# Patient Record
Sex: MALE | Race: WHITE | NOT HISPANIC OR LATINO | Employment: OTHER | URBAN - METROPOLITAN AREA
[De-identification: names, ages, dates, MRNs, and addresses within clinical notes are randomized per-mention and may not be internally consistent; named-entity substitution may affect disease eponyms.]

---

## 2021-05-31 ENCOUNTER — RECORDS - HEALTHEAST (OUTPATIENT)
Dept: ADMINISTRATIVE | Facility: CLINIC | Age: 76
End: 2021-05-31

## 2021-06-09 ENCOUNTER — COMMUNICATION - HEALTHEAST (OUTPATIENT)
Dept: SCHEDULING | Facility: CLINIC | Age: 76
End: 2021-06-09

## 2021-06-16 PROBLEM — M62.82 RHABDOMYOLYSIS: Status: ACTIVE | Noted: 2021-06-08

## 2024-02-01 ENCOUNTER — APPOINTMENT (OUTPATIENT)
Dept: RADIOLOGY | Facility: CLINIC | Age: 79
End: 2024-02-01
Attending: EMERGENCY MEDICINE
Payer: COMMERCIAL

## 2024-02-01 ENCOUNTER — HOSPITAL ENCOUNTER (OUTPATIENT)
Facility: CLINIC | Age: 79
Setting detail: OBSERVATION
Discharge: NURSING, REHAB OR LONG-TERM CARE | End: 2024-02-04
Attending: EMERGENCY MEDICINE | Admitting: STUDENT IN AN ORGANIZED HEALTH CARE EDUCATION/TRAINING PROGRAM
Payer: COMMERCIAL

## 2024-02-01 DIAGNOSIS — R53.1 GENERALIZED WEAKNESS: ICD-10-CM

## 2024-02-01 DIAGNOSIS — N17.9 AKI (ACUTE KIDNEY INJURY) (H): ICD-10-CM

## 2024-02-01 DIAGNOSIS — I48.4 ATYPICAL ATRIAL FLUTTER (H): ICD-10-CM

## 2024-02-01 DIAGNOSIS — K59.09 OTHER CONSTIPATION: Primary | ICD-10-CM

## 2024-02-01 DIAGNOSIS — E86.0 DEHYDRATION: ICD-10-CM

## 2024-02-01 DIAGNOSIS — R79.89 ELEVATED TROPONIN: ICD-10-CM

## 2024-02-01 DIAGNOSIS — E78.5 HYPERLIPIDEMIA, UNSPECIFIED HYPERLIPIDEMIA TYPE: ICD-10-CM

## 2024-02-01 LAB
ALBUMIN SERPL BCG-MCNC: 4.2 G/DL (ref 3.5–5.2)
ALP SERPL-CCNC: 70 U/L (ref 40–150)
ALT SERPL W P-5'-P-CCNC: 10 U/L (ref 0–70)
ANION GAP SERPL CALCULATED.3IONS-SCNC: 11 MMOL/L (ref 7–15)
AST SERPL W P-5'-P-CCNC: 20 U/L (ref 0–45)
BASOPHILS # BLD AUTO: 0 10E3/UL (ref 0–0.2)
BASOPHILS NFR BLD AUTO: 0 %
BILIRUB SERPL-MCNC: 0.4 MG/DL
BUN SERPL-MCNC: 31.1 MG/DL (ref 8–23)
CALCIUM SERPL-MCNC: 9.6 MG/DL (ref 8.8–10.2)
CHLORIDE SERPL-SCNC: 110 MMOL/L (ref 98–107)
CREAT SERPL-MCNC: 1.76 MG/DL (ref 0.67–1.17)
DEPRECATED HCO3 PLAS-SCNC: 22 MMOL/L (ref 22–29)
EGFRCR SERPLBLD CKD-EPI 2021: 39 ML/MIN/1.73M2
EOSINOPHIL # BLD AUTO: 0 10E3/UL (ref 0–0.7)
EOSINOPHIL NFR BLD AUTO: 0 %
ERYTHROCYTE [DISTWIDTH] IN BLOOD BY AUTOMATED COUNT: 13.5 % (ref 10–15)
GLUCOSE SERPL-MCNC: 107 MG/DL (ref 70–99)
HCT VFR BLD AUTO: 29.1 % (ref 40–53)
HGB BLD-MCNC: 9.6 G/DL (ref 13.3–17.7)
IMM GRANULOCYTES # BLD: 0.1 10E3/UL
IMM GRANULOCYTES NFR BLD: 1 %
LACTATE SERPL-SCNC: 1.5 MMOL/L (ref 0.7–2)
LYMPHOCYTES # BLD AUTO: 0.8 10E3/UL (ref 0.8–5.3)
LYMPHOCYTES NFR BLD AUTO: 8 %
MCH RBC QN AUTO: 32.4 PG (ref 26.5–33)
MCHC RBC AUTO-ENTMCNC: 33 G/DL (ref 31.5–36.5)
MCV RBC AUTO: 98 FL (ref 78–100)
MONOCYTES # BLD AUTO: 0.9 10E3/UL (ref 0–1.3)
MONOCYTES NFR BLD AUTO: 8 %
NEUTROPHILS # BLD AUTO: 9 10E3/UL (ref 1.6–8.3)
NEUTROPHILS NFR BLD AUTO: 83 %
NRBC # BLD AUTO: 0 10E3/UL
NRBC BLD AUTO-RTO: 0 /100
PLATELET # BLD AUTO: 263 10E3/UL (ref 150–450)
POTASSIUM SERPL-SCNC: 4.6 MMOL/L (ref 3.4–5.3)
PROCALCITONIN SERPL IA-MCNC: 0.29 NG/ML
PROT SERPL-MCNC: 6.8 G/DL (ref 6.4–8.3)
RBC # BLD AUTO: 2.96 10E6/UL (ref 4.4–5.9)
SODIUM SERPL-SCNC: 143 MMOL/L (ref 135–145)
TROPONIN T SERPL HS-MCNC: 85 NG/L
WBC # BLD AUTO: 10.8 10E3/UL (ref 4–11)

## 2024-02-01 PROCEDURE — 83036 HEMOGLOBIN GLYCOSYLATED A1C: CPT

## 2024-02-01 PROCEDURE — 71045 X-RAY EXAM CHEST 1 VIEW: CPT

## 2024-02-01 PROCEDURE — 87040 BLOOD CULTURE FOR BACTERIA: CPT | Performed by: EMERGENCY MEDICINE

## 2024-02-01 PROCEDURE — 36415 COLL VENOUS BLD VENIPUNCTURE: CPT | Performed by: EMERGENCY MEDICINE

## 2024-02-01 PROCEDURE — 84484 ASSAY OF TROPONIN QUANT: CPT | Performed by: EMERGENCY MEDICINE

## 2024-02-01 PROCEDURE — 80053 COMPREHEN METABOLIC PANEL: CPT | Performed by: EMERGENCY MEDICINE

## 2024-02-01 PROCEDURE — 99285 EMERGENCY DEPT VISIT HI MDM: CPT | Mod: 25

## 2024-02-01 PROCEDURE — 93005 ELECTROCARDIOGRAM TRACING: CPT | Performed by: EMERGENCY MEDICINE

## 2024-02-01 PROCEDURE — 96360 HYDRATION IV INFUSION INIT: CPT

## 2024-02-01 PROCEDURE — 84145 PROCALCITONIN (PCT): CPT | Performed by: EMERGENCY MEDICINE

## 2024-02-01 PROCEDURE — 258N000003 HC RX IP 258 OP 636: Performed by: EMERGENCY MEDICINE

## 2024-02-01 PROCEDURE — 85025 COMPLETE CBC W/AUTO DIFF WBC: CPT | Performed by: EMERGENCY MEDICINE

## 2024-02-01 PROCEDURE — 83605 ASSAY OF LACTIC ACID: CPT | Performed by: EMERGENCY MEDICINE

## 2024-02-01 RX ADMIN — SODIUM CHLORIDE 1000 ML: 9 INJECTION, SOLUTION INTRAVENOUS at 23:11

## 2024-02-01 ASSESSMENT — ACTIVITIES OF DAILY LIVING (ADL): ADLS_ACUITY_SCORE: 35

## 2024-02-01 ASSESSMENT — ENCOUNTER SYMPTOMS
COUGH: 0
SHORTNESS OF BREATH: 0
BACK PAIN: 1
WEAKNESS: 1
CHILLS: 0
ARTHRALGIAS: 1

## 2024-02-02 ENCOUNTER — APPOINTMENT (OUTPATIENT)
Dept: CARDIOLOGY | Facility: CLINIC | Age: 79
End: 2024-02-02
Attending: INTERNAL MEDICINE
Payer: COMMERCIAL

## 2024-02-02 ENCOUNTER — APPOINTMENT (OUTPATIENT)
Dept: OCCUPATIONAL THERAPY | Facility: CLINIC | Age: 79
End: 2024-02-02
Payer: COMMERCIAL

## 2024-02-02 ENCOUNTER — APPOINTMENT (OUTPATIENT)
Dept: PHYSICAL THERAPY | Facility: CLINIC | Age: 79
End: 2024-02-02
Payer: COMMERCIAL

## 2024-02-02 PROBLEM — I48.4 ATYPICAL ATRIAL FLUTTER (H): Status: ACTIVE | Noted: 2024-02-02

## 2024-02-02 PROBLEM — R79.89 ELEVATED TROPONIN: Status: ACTIVE | Noted: 2024-02-02

## 2024-02-02 PROBLEM — R53.1 GENERALIZED WEAKNESS: Status: ACTIVE | Noted: 2024-02-02

## 2024-02-02 PROBLEM — E86.0 DEHYDRATION: Status: ACTIVE | Noted: 2024-02-02

## 2024-02-02 LAB
ALBUMIN UR-MCNC: 10 MG/DL
ANION GAP SERPL CALCULATED.3IONS-SCNC: 8 MMOL/L (ref 7–15)
APPEARANCE UR: CLEAR
BACTERIA #/AREA URNS HPF: ABNORMAL /HPF
BILIRUB UR QL STRIP: NEGATIVE
BUN SERPL-MCNC: 28.9 MG/DL (ref 8–23)
CALCIUM SERPL-MCNC: 8.9 MG/DL (ref 8.8–10.2)
CHLORIDE SERPL-SCNC: 112 MMOL/L (ref 98–107)
COLOR UR AUTO: ABNORMAL
CREAT SERPL-MCNC: 1.57 MG/DL (ref 0.67–1.17)
DEPRECATED HCO3 PLAS-SCNC: 23 MMOL/L (ref 22–29)
EGFRCR SERPLBLD CKD-EPI 2021: 45 ML/MIN/1.73M2
ERYTHROCYTE [DISTWIDTH] IN BLOOD BY AUTOMATED COUNT: 13.7 % (ref 10–15)
FLUAV RNA SPEC QL NAA+PROBE: NEGATIVE
FLUBV RNA RESP QL NAA+PROBE: NEGATIVE
GLUCOSE BLDC GLUCOMTR-MCNC: 104 MG/DL (ref 70–99)
GLUCOSE SERPL-MCNC: 96 MG/DL (ref 70–99)
GLUCOSE UR STRIP-MCNC: NEGATIVE MG/DL
HBA1C MFR BLD: 6.7 %
HCT VFR BLD AUTO: 27.2 % (ref 40–53)
HGB BLD-MCNC: 9 G/DL (ref 13.3–17.7)
HGB UR QL STRIP: NEGATIVE
HYALINE CASTS: 8 /LPF
KETONES UR STRIP-MCNC: NEGATIVE MG/DL
LEUKOCYTE ESTERASE UR QL STRIP: NEGATIVE
MCH RBC QN AUTO: 32.8 PG (ref 26.5–33)
MCHC RBC AUTO-ENTMCNC: 33.1 G/DL (ref 31.5–36.5)
MCV RBC AUTO: 99 FL (ref 78–100)
MUCOUS THREADS #/AREA URNS LPF: PRESENT /LPF
NITRATE UR QL: NEGATIVE
PH UR STRIP: 5 [PH] (ref 5–7)
PLATELET # BLD AUTO: 232 10E3/UL (ref 150–450)
POTASSIUM SERPL-SCNC: 4.1 MMOL/L (ref 3.4–5.3)
RBC # BLD AUTO: 2.74 10E6/UL (ref 4.4–5.9)
RBC URINE: 1 /HPF
RSV RNA SPEC NAA+PROBE: NEGATIVE
SARS-COV-2 RNA RESP QL NAA+PROBE: NEGATIVE
SODIUM SERPL-SCNC: 143 MMOL/L (ref 135–145)
SP GR UR STRIP: 1.01 (ref 1–1.03)
SQUAMOUS EPITHELIAL: <1 /HPF
TROPONIN T SERPL HS-MCNC: 70 NG/L
TSH SERPL DL<=0.005 MIU/L-ACNC: 2.62 UIU/ML (ref 0.3–4.2)
UROBILINOGEN UR STRIP-MCNC: <2 MG/DL
WBC # BLD AUTO: 8.4 10E3/UL (ref 4–11)
WBC URINE: 1 /HPF

## 2024-02-02 PROCEDURE — 81001 URINALYSIS AUTO W/SCOPE: CPT | Performed by: EMERGENCY MEDICINE

## 2024-02-02 PROCEDURE — 97166 OT EVAL MOD COMPLEX 45 MIN: CPT | Mod: GO

## 2024-02-02 PROCEDURE — 250N000013 HC RX MED GY IP 250 OP 250 PS 637

## 2024-02-02 PROCEDURE — 80048 BASIC METABOLIC PNL TOTAL CA: CPT

## 2024-02-02 PROCEDURE — 36415 COLL VENOUS BLD VENIPUNCTURE: CPT

## 2024-02-02 PROCEDURE — 85027 COMPLETE CBC AUTOMATED: CPT

## 2024-02-02 PROCEDURE — 51798 US URINE CAPACITY MEASURE: CPT

## 2024-02-02 PROCEDURE — 87040 BLOOD CULTURE FOR BACTERIA: CPT | Performed by: EMERGENCY MEDICINE

## 2024-02-02 PROCEDURE — 250N000013 HC RX MED GY IP 250 OP 250 PS 637: Performed by: STUDENT IN AN ORGANIZED HEALTH CARE EDUCATION/TRAINING PROGRAM

## 2024-02-02 PROCEDURE — 93306 TTE W/DOPPLER COMPLETE: CPT | Mod: 26 | Performed by: INTERNAL MEDICINE

## 2024-02-02 PROCEDURE — 97116 GAIT TRAINING THERAPY: CPT | Mod: GP

## 2024-02-02 PROCEDURE — 99222 1ST HOSP IP/OBS MODERATE 55: CPT | Performed by: INTERNAL MEDICINE

## 2024-02-02 PROCEDURE — 93005 ELECTROCARDIOGRAM TRACING: CPT | Performed by: EMERGENCY MEDICINE

## 2024-02-02 PROCEDURE — 84443 ASSAY THYROID STIM HORMONE: CPT

## 2024-02-02 PROCEDURE — G0378 HOSPITAL OBSERVATION PER HR: HCPCS

## 2024-02-02 PROCEDURE — 99222 1ST HOSP IP/OBS MODERATE 55: CPT | Mod: GC

## 2024-02-02 PROCEDURE — 97535 SELF CARE MNGMENT TRAINING: CPT | Mod: GO

## 2024-02-02 PROCEDURE — 36415 COLL VENOUS BLD VENIPUNCTURE: CPT | Performed by: EMERGENCY MEDICINE

## 2024-02-02 PROCEDURE — 97161 PT EVAL LOW COMPLEX 20 MIN: CPT | Mod: GP

## 2024-02-02 PROCEDURE — 84484 ASSAY OF TROPONIN QUANT: CPT | Performed by: EMERGENCY MEDICINE

## 2024-02-02 PROCEDURE — 93306 TTE W/DOPPLER COMPLETE: CPT

## 2024-02-02 PROCEDURE — 82962 GLUCOSE BLOOD TEST: CPT

## 2024-02-02 PROCEDURE — 87637 SARSCOV2&INF A&B&RSV AMP PRB: CPT | Performed by: EMERGENCY MEDICINE

## 2024-02-02 PROCEDURE — 97530 THERAPEUTIC ACTIVITIES: CPT | Mod: GP

## 2024-02-02 PROCEDURE — 51701 INSERT BLADDER CATHETER: CPT

## 2024-02-02 RX ORDER — SENNOSIDES 8.6 MG
8.6 TABLET ORAL 2 TIMES DAILY PRN
Status: DISCONTINUED | OUTPATIENT
Start: 2024-02-02 | End: 2024-02-04 | Stop reason: HOSPADM

## 2024-02-02 RX ORDER — HYDRALAZINE HYDROCHLORIDE 20 MG/ML
10 INJECTION INTRAMUSCULAR; INTRAVENOUS EVERY 4 HOURS PRN
Status: DISCONTINUED | OUTPATIENT
Start: 2024-02-02 | End: 2024-02-04 | Stop reason: HOSPADM

## 2024-02-02 RX ORDER — HYDROCHLOROTHIAZIDE 12.5 MG/1
12.5 TABLET ORAL DAILY
Status: DISCONTINUED | OUTPATIENT
Start: 2024-02-02 | End: 2024-02-02 | Stop reason: ALTCHOICE

## 2024-02-02 RX ORDER — VIT C/E/ZN/COPPR/LUTEIN/ZEAXAN 60 MG-6 MG
1 CAPSULE ORAL 2 TIMES DAILY
Status: DISCONTINUED | OUTPATIENT
Start: 2024-02-02 | End: 2024-02-04 | Stop reason: HOSPADM

## 2024-02-02 RX ORDER — POLYETHYLENE GLYCOL 3350 17 G/17G
17 POWDER, FOR SOLUTION ORAL DAILY
Status: DISCONTINUED | OUTPATIENT
Start: 2024-02-02 | End: 2024-02-04 | Stop reason: HOSPADM

## 2024-02-02 RX ORDER — ASPIRIN 81 MG/1
81 TABLET ORAL DAILY
Status: DISCONTINUED | OUTPATIENT
Start: 2024-02-02 | End: 2024-02-04 | Stop reason: HOSPADM

## 2024-02-02 RX ORDER — VALSARTAN 80 MG/1
160 TABLET ORAL DAILY
Status: DISCONTINUED | OUTPATIENT
Start: 2024-02-02 | End: 2024-02-04 | Stop reason: HOSPADM

## 2024-02-02 RX ORDER — SIMVASTATIN 20 MG
40 TABLET ORAL AT BEDTIME
Status: DISCONTINUED | OUTPATIENT
Start: 2024-02-02 | End: 2024-02-02

## 2024-02-02 RX ORDER — GABAPENTIN 300 MG/1
900 CAPSULE ORAL 2 TIMES DAILY
Status: DISCONTINUED | OUTPATIENT
Start: 2024-02-02 | End: 2024-02-04 | Stop reason: HOSPADM

## 2024-02-02 RX ORDER — AMLODIPINE BESYLATE 2.5 MG/1
2.5 TABLET ORAL DAILY
Status: DISCONTINUED | OUTPATIENT
Start: 2024-02-02 | End: 2024-02-04 | Stop reason: HOSPADM

## 2024-02-02 RX ORDER — HYDRALAZINE HYDROCHLORIDE 10 MG/1
10 TABLET, FILM COATED ORAL EVERY 4 HOURS PRN
Status: DISCONTINUED | OUTPATIENT
Start: 2024-02-02 | End: 2024-02-04 | Stop reason: HOSPADM

## 2024-02-02 RX ORDER — ACETAMINOPHEN 650 MG/1
650 SUPPOSITORY RECTAL EVERY 4 HOURS PRN
Status: DISCONTINUED | OUTPATIENT
Start: 2024-02-02 | End: 2024-02-04 | Stop reason: HOSPADM

## 2024-02-02 RX ORDER — PANTOPRAZOLE SODIUM 40 MG/1
40 TABLET, DELAYED RELEASE ORAL AT BEDTIME
Status: DISCONTINUED | OUTPATIENT
Start: 2024-02-02 | End: 2024-02-04 | Stop reason: HOSPADM

## 2024-02-02 RX ORDER — HYDROCHLOROTHIAZIDE 12.5 MG/1
12.5 CAPSULE ORAL DAILY
Status: DISCONTINUED | OUTPATIENT
Start: 2024-02-03 | End: 2024-02-04 | Stop reason: HOSPADM

## 2024-02-02 RX ORDER — AMLODIPINE BESYLATE 2.5 MG/1
2.5 TABLET ORAL DAILY
Status: ON HOLD | COMMUNITY
End: 2024-02-14

## 2024-02-02 RX ORDER — AMOXICILLIN 250 MG
1 CAPSULE ORAL 2 TIMES DAILY PRN
Status: DISCONTINUED | OUTPATIENT
Start: 2024-02-02 | End: 2024-02-04 | Stop reason: HOSPADM

## 2024-02-02 RX ORDER — SIMVASTATIN 20 MG
20 TABLET ORAL AT BEDTIME
Status: DISCONTINUED | OUTPATIENT
Start: 2024-02-02 | End: 2024-02-04 | Stop reason: HOSPADM

## 2024-02-02 RX ORDER — CALCIUM CARBONATE 500 MG/1
1000 TABLET, CHEWABLE ORAL 4 TIMES DAILY PRN
Status: DISCONTINUED | OUTPATIENT
Start: 2024-02-02 | End: 2024-02-04 | Stop reason: HOSPADM

## 2024-02-02 RX ORDER — LIDOCAINE 40 MG/G
CREAM TOPICAL
Status: DISCONTINUED | OUTPATIENT
Start: 2024-02-02 | End: 2024-02-04 | Stop reason: HOSPADM

## 2024-02-02 RX ORDER — ACETAMINOPHEN 325 MG/1
650 TABLET ORAL EVERY 4 HOURS PRN
Status: DISCONTINUED | OUTPATIENT
Start: 2024-02-02 | End: 2024-02-04 | Stop reason: HOSPADM

## 2024-02-02 RX ORDER — AMOXICILLIN 250 MG
2 CAPSULE ORAL 2 TIMES DAILY PRN
Status: DISCONTINUED | OUTPATIENT
Start: 2024-02-02 | End: 2024-02-04 | Stop reason: HOSPADM

## 2024-02-02 RX ADMIN — METFORMIN HYDROCHLORIDE 1000 MG: 500 TABLET, FILM COATED ORAL at 12:52

## 2024-02-02 RX ADMIN — ACETAMINOPHEN 650 MG: 325 TABLET ORAL at 08:31

## 2024-02-02 RX ADMIN — Medication 1 CAPSULE: at 20:12

## 2024-02-02 RX ADMIN — PANTOPRAZOLE SODIUM 40 MG: 40 TABLET, DELAYED RELEASE ORAL at 20:11

## 2024-02-02 RX ADMIN — VALSARTAN 160 MG: 80 TABLET, FILM COATED ORAL at 12:51

## 2024-02-02 RX ADMIN — GABAPENTIN 900 MG: 300 CAPSULE ORAL at 20:11

## 2024-02-02 RX ADMIN — METFORMIN HYDROCHLORIDE 1000 MG: 500 TABLET, FILM COATED ORAL at 18:44

## 2024-02-02 RX ADMIN — GABAPENTIN 900 MG: 300 CAPSULE ORAL at 12:55

## 2024-02-02 RX ADMIN — AMLODIPINE BESYLATE 2.5 MG: 2.5 TABLET ORAL at 12:52

## 2024-02-02 RX ADMIN — HYDROCHLOROTHIAZIDE 12.5 MG: 12.5 TABLET ORAL at 12:52

## 2024-02-02 RX ADMIN — ASPIRIN 81 MG: 81 TABLET, COATED ORAL at 12:52

## 2024-02-02 RX ADMIN — POLYETHYLENE GLYCOL 3350 17 G: 17 POWDER, FOR SOLUTION ORAL at 12:52

## 2024-02-02 RX ADMIN — SIMVASTATIN 20 MG: 20 TABLET, FILM COATED ORAL at 20:12

## 2024-02-02 ASSESSMENT — ACTIVITIES OF DAILY LIVING (ADL)
ADLS_ACUITY_SCORE: 45
DEPENDENT_IADLS:: INDEPENDENT
ADLS_ACUITY_SCORE: 45
ADLS_ACUITY_SCORE: 36
ADLS_ACUITY_SCORE: 45
ADLS_ACUITY_SCORE: 36
ADLS_ACUITY_SCORE: 39
ADLS_ACUITY_SCORE: 45

## 2024-02-02 NOTE — H&P
[facility]    History and Physical - Hospitalist Service       Date of Admission:  2/1/2024    Assessment & Plan      Pa García is a 78 year old male who has a history of HTN, T2DM, hx of complications from back surgery with leg weakness, neurogenic bladder, CKD III and is presenting for generalized weakness and mechanical fall admitted on 2/1/24 found to have ROXIE on CKD and new a flutter.     Generalized weakness  Mechanical fall  Intermittent new a flutter  Elevated troponin  Patient presenting for progressively worsening generalized weakness and mechanical fall.  No LOS, did not hit head.  No associated symptoms.  No chest pain or palpitations. Presented afebrile, vitally stable, satting well on RA.  CMP with BUN 31.1, creatinine 1.76, GFR 39.  Otherwise normal.  Glucose 107.  Lactic acid 1.5.  Pro-Julio negative.  Troponin 85, 70 on repeat.  CBC with hemoglobin 9.6.  UA negative for infection.  Negative for COVID-19, influenza A/B, RSV.  CXR without acute pathology.  Blood cultures obtained.  EKG showing a flutter and variable AV block.  A flutter appears to be new and nonsustained.  Given that it is not sustained and heart rate is normal can hold off on treatment such as beta-blocker or anticoagulation at this time  -Admit to inpatient for cardiac monitoring  -Cardiac telemetry  -Continue PTA aspirin 81 mg  -PT/OT consulted  -TSH    ROXIE on CKD stage III  On presentation creatinine 1.76, BUN 31.1.  Baseline appears to be 1-1.2.  History supports dehydration.  Patient is euvolemic on exam.  Patient received 1 L IVF  -BMP in a.m.  -Encourage p.o. intake    Normocytic anemia  9.6 on admission with normal MCV.  No signs of gross bleed, hemodynamically stable.  Likely related to CKD.   -CBC in a.m.    T2DM  Patient manages with metformin only, no insulin.  -A1c  -Hold metformin in ROXIE    HTN  Patient reports taking morning medications.  -Continue PTA hydrochlorothiazide 12.5 mg  daily  -Hold PTA valsartan in setting of ROXIE  -Hydralazine as needed    Chronic hip pain and low back pain  -Continue PTA gabapentin 300 mg twice daily      Med rec needed        Diet: Moderate Consistent Carb (60 g CHO per Meal) Diet  DVT Prophylaxis: Pneumatic Compression Devices  Gilbert Catheter: Not present  Fluids: PO  Lines: None     Cardiac Monitoring: ACTIVE order. Indication: Tachyarrhythmias, acute (48 hours)  Code Status: Full Code      Disposition Plan      Expected Discharge Date: 02/04/2024                The patient's care was discussed with the Attending Physician, Dr. Richy Siegel. Patient will be seen in AM by Dr. Eva Nayak .      Rosalinda Kohli MD  Hospitalist Service  [facility]  Securely message with [employer] (more info)  Text page via [facility] Paging/Directory   ______________________________________________________________________    Chief Complaint   Weakness, Fall    History is obtained from the patient    History of Present Illness   Pa García is a 78 year old male who has a history of HTN, hx of complications from back surgery with leg weakness, neurogenic bladder, CKD III and is admitted for generalized weakness and mechanical fall    Patient reports he has been feeling progressively weaker over the last month or so.  Participates in physical therapy and struggles with balance and weakness since his back surgery.  Reports 2/1/2024 was folding laundry and when he stepped back he lost his balance and fell to the ground.  No loss of consciousness, did not hit his head.  Reports no sudden symptoms prior to this such as no chest pain, no palpitations, no lightheadedness.  Reports he had a similar fall recently, he called his granddaughter to help. For this recent fall event he was not able to get himself up off the ground due to weakness and so he called EMS.    Otherwise reports no changes including no fevers, no recent illnesses, no chest pain, no shortness of  breath, no palpitations, no abdominal pain, no change in BM, no change in urination, no numbness or tingling in the legs.  Reports chronic pain in hips though no changes to this.  No changes to his medication.    Patient does endorse low p.o. intake, does not drink much water.      ED course:  Presented afebrile, vitally stable, satting well on RA.  CMP with BUN 31.1, creatinine 1.76, GFR 39.  Otherwise normal.  Glucose 107.  Lactic acid 1.5.  Pro-Julio negative.  Troponin 85, 70 on repeat.  CBC with hemoglobin 9.6.  UA negative for infection.  Negative for COVID-19, influenza A/B, RSV.  CXR without acute pathology.  Blood cultures obtained.  EKG showing a flutter and variable AV block.    In the ED patient received 1 L IVF      Past Medical History    History reviewed. No pertinent past medical history.    Past Surgical History   Past Surgical History:   Procedure Laterality Date    BACK SURGERY      lumbar laminectomy and fusion/decompression    LUMBAR LAMINECTOMY Bilateral 3/8/2016    Procedure: REVISION DECOMPRESSION BILATERAL L2-3;  Surgeon: Alexis Amado MD;  Location: [facility];  Service:     LUMBAR LAMINECTOMY  3/13/2016    Procedure: Posterior Laminectomy L1-L3 with Dural Repair, and Microscopic Discectomy;  Surgeon: Foster Rae MD;  Location: [facility];  Service:        Prior to Admission Medications   Prior to Admission Medications   Prescriptions Last Dose Informant Patient Reported? Taking?   UNABLE TO FIND   Yes No   Sig: [UNABLE TO FIND] Med Name: Super beta prostate - 1 capsule two times a day.   UNABLE TO FIND   Yes No   Sig: [UNABLE TO FIND] Med Name: Prevagen - 1 capsule daily.   acetaminophen (TYLENOL) 325 MG tablet   Yes No   Sig: [ACETAMINOPHEN (TYLENOL) 325 MG TABLET] Take 650 mg by mouth every 4 (four) hours as needed for pain.   aspirin 81 MG EC tablet   Yes No   Sig: [ASPIRIN 81 MG EC TABLET] Take 81 mg by mouth daily.   gabapentin (NEURONTIN) 300 MG capsule   Yes  No   Sig: [GABAPENTIN (NEURONTIN) 300 MG CAPSULE] Take 900 mg by mouth 2 (two) times a day.    hydroCHLOROthiazide (HYDRODIURIL) 12.5 MG tablet   Yes No   Sig: [HYDROCHLOROTHIAZIDE (HYDRODIURIL) 12.5 MG TABLET] Take 12.5 mg by mouth daily.   lansoprazole (PREVACID) 30 MG capsule   Yes No   Sig: [LANSOPRAZOLE (PREVACID) 30 MG CAPSULE] Take 30 mg by mouth daily.   metFORMIN (GLUCOPHAGE) 1000 MG tablet   Yes No   Sig: [METFORMIN (GLUCOPHAGE) 1000 MG TABLET] Take 1,000 mg by mouth 2 (two) times a day with meals.   simvastatin (ZOCOR) 40 MG tablet   Yes No   Sig: [SIMVASTATIN (ZOCOR) 40 MG TABLET] Take 40 mg by mouth bedtime.   valsartan (DIOVAN) 160 MG tablet   Yes No   Sig: [VALSARTAN (DIOVAN) 160 MG TABLET] Take 160 mg by mouth daily.   vit C,I-Ko-fryeh-lutein-zeaxan (PRESERVISION AREDS-2) 250-90-40-1 mg cap   Yes No   Sig: [VIT C,X-BT-PGPBY-LUTEIN-ZEAXAN (PRESERVISION AREDS-2) 250-90-40-1 MG CAP] Take 1 capsule by mouth 2 (two) times a day.   white petrolatum (AQUAPHOR NATURAL HEALING) 41 % Oint   No No   Sig: [WHITE PETROLATUM (AQUAPHOR NATURAL HEALING) 41 % OINT] Apply to wounds daily as needed.      Facility-Administered Medications: None           Physical Exam   Vital Signs: Temp: 97.1  F (36.2  C) Temp src: Rectal BP: (!) 157/67 Pulse: 73   Resp: 25 SpO2: 98 % O2 Device: None (Room air)    Weight: 150 lbs 0 oz    Physical Exam  Constitutional:       General: He is not in acute distress.     Appearance: Normal appearance. He is not ill-appearing or diaphoretic.   HENT:      Nose: Nose normal.      Mouth/Throat:      Mouth: Mucous membranes are moist.      Pharynx: Oropharynx is clear.   Eyes:      Extraocular Movements: Extraocular movements intact.      Conjunctiva/sclera: Conjunctivae normal.   Cardiovascular:      Rate and Rhythm: Normal rate and regular rhythm.      Heart sounds: Normal heart sounds.   Pulmonary:      Effort: Pulmonary effort is normal. No respiratory distress.      Breath sounds: Normal  breath sounds.   Abdominal:      General: Bowel sounds are normal. There is no distension.      Palpations: Abdomen is soft.      Tenderness: There is no abdominal tenderness.   Musculoskeletal:      Right lower leg: No edema.      Left lower leg: No edema.   Skin:     General: Skin is warm and dry.   Neurological:      General: No focal deficit present.      Mental Status: He is alert and oriented to person, place, and time.   Psychiatric:         Mood and Affect: Mood normal.         Behavior: Behavior normal.         Data     I have personally reviewed the following data over the past 24 hrs:    10.8  \   9.6 (L)   / 263     143 110 (H) 31.1 (H) /  107 (H)   4.6 22 1.76 (H) \     ALT: 10 AST: 20 AP: 70 TBILI: 0.4   ALB: 4.2 TOT PROTEIN: 6.8 LIPASE: N/A     Trop: 70 (H) BNP: N/A     Procal: 0.29 CRP: N/A Lactic Acid: 1.5         Imaging results reviewed over the past 24 hrs:   Recent Results (from the past 24 hour(s))   XR Chest Port 1 View    Narrative    EXAM: XR CHEST PORT 1 VIEW  LOCATION: [facility]  DATE: 2/1/2024    INDICATION: weakness  COMPARISON: 06/08/2021      Impression    IMPRESSION: Lungs are clear. No pleural effusion or pneumothorax. Borderline-enlarged cardiac silhouette, stable. Normal pulmonary vascularity. Degenerative and hypertrophic changes about the left proximal humerus may be posttraumatic.

## 2024-02-02 NOTE — ED TRIAGE NOTES
Pt arrives via ems after falling at home in between 7-9 pm denies loc, thinner use, or hitting of head. Pt call 911 because he was too weak to get up.      Triage Assessment (Adult)       Row Name 02/01/24 8867          Triage Assessment    Airway WDL WDL        Respiratory WDL    Respiratory WDL WDL        Peripheral/Neurovascular WDL    Peripheral Neurovascular WDL WDL

## 2024-02-02 NOTE — ED NOTES
Bed: [facility]ED-06  Expected date: 2/1/24  Expected time: 10:48 PM  Means of arrival: Ambulance  Comments:  [facility]  79 y/o M   Fall, Weak  No head injury, No LOC, No thinners

## 2024-02-02 NOTE — CONSULTS
Care Management Initial Consult    General Information  Assessment completed with: Patient,    Type of CM/SW Visit: Initial Assessment    Primary Care Provider verified and updated as needed: Yes   Readmission within the last 30 days: no previous admission in last 30 days      Reason for Consult: discharge planning  Advance Care Planning: Advance Care Planning Reviewed:  (No HCD on filed. HE states has HCD as part of his trust and that hakeem Munguia should make medical decisions for him IF he were not able to make them for himself.)        Communication Assessment  Patient's communication style: spoken language (English or Bilingual)        Cognitive  Cognitive/Neuro/Behavioral: WDL                      Living Environment:   People in home: alone     Current living Arrangements: house- couple steps to enter home from garage. Full set of stairs to get up to bedroom/bathroom.       Able to return to prior arrangements: yes (TBD)       Family/Social Support:  Care provided by: self  Provides care for: no one  Marital Status:   Children (hakeem Munguia)          Description of Support System: Supportive, Involved       Current Resources:   Patient receiving home care services: No     Community Resources: None  Equipment/supplies currently used at home: Diabetic Supplies (cane, walker, right foot/leg brace for foot drop, Rx glasses, hearing aides)    Employment/Financial:  Employment Status: retired     Employment/ Comments: [employer] for 6 years  Financial Concerns: none   Referral to Financial Worker: No     Does the patient's insurance plan have a 3 day qualifying hospital stay waiver?  Yes     Which insurance plan 3 day waiver is available? Alternative insurance waiver    Will the waiver be used for post-acute placement? Undetermined at this time- PT/OT pending. Has [facility] Medicare Advantage plan and would not require 3 day IP stay if TCU is indicated.     Lifestyle & Psychosocial Needs:  Social Determinants  of Health     Food Insecurity: Not on file   Depression: Not on file   Housing Stability: Not on file   Tobacco Use: Low Risk  (2/1/2024)    Patient History     Smoking Tobacco Use: Never     Smokeless Tobacco Use: Never     Passive Exposure: Not on file   Financial Resource Strain: Not on file   Alcohol Use: Not on file   Transportation Needs: Not on file   Physical Activity: Not on file   Interpersonal Safety: Not on file   Stress: Not on file   Social Connections: Not on file       Functional Status:  Prior to admission patient needed assistance:   Dependent ADLs:: Independent, Ambulation-cane, Ambulation-walker  Dependent IADLs:: Independent       Mental Health Status:  Mental Health Status: No Current Concerns       Chemical Dependency Status:  Chemical Dependency Status: No Current Concerns             Values/Beliefs:  Spiritual, Cultural Beliefs, Quaker Practices, Values that affect care: no               Additional Information:  Discussed with MD (CODY). Chart reviewed. Cardiology consult today. PT/OT pending.     UR notified CM of change from IP to observation status.   CM provided MOON/observation notice and education. Has [employer] Medicare Advantage Plan. He verbalized understanding and signed. Original in chart; copy to patient.     Patient lives alone in private home (not able to meet all needs on one level, he thinks about 12 steps up to bedroom from kitchen/living room level. A couple steps to enter home from garage. Independent with all cares, including driving. Uses cane and walker. PCP confirmed. No private duty or skilled HC services.     Patient to ER via EMS due to fall. He has not yet ambulated in ER. CM will follow up with patient for further discharge planning once PT/OT has evaluated.     Pippa Fraga RN

## 2024-02-02 NOTE — CONSULTS
[facility] [address], [place] [address]   [email or web address]   OFFICE: [phone]        Impression and Plan     1.  Atrial flutter.  Ventricular response reasonable without specific rate control therapy.  Normally, Pa would be candidate for full anticoagulation for CVA prophylaxis.  In his case, he would be at risk for potential bleeding issues/injury related to instability and falling.  In addition to the fall leading to this admission, he had another recent fall.  He had a major fall a few years ago when he had fallen down a flight of stairs send was unable to get up for approximately 19 hours.  Her standardly, Pa is very reticent to initiate full anticoagulation and in accordance with patient wishes will defer.  He does take a daily aspirin.  Echocardiogram.    2.  Elevated troponin.  Patient with marginal elevation in troponin with negative delta change (85 and 70 ng/L).  Doubt this represents significant myocardial injury particularly in light of renal insufficiency which decreases the specificity.  In addition, although fairly specific to myocardium, it is possible it may be some musculoskeletal cross-reactivity given recent mechanical fall.  In addition, he denies any chest pain symptoms concerning for angina.  ECG with nonspecific T wave changes only.  Given the aforementioned, do not feel immediate additional cardiac workup is required.  Echocardiogram as per problem #1.    3.  Hypertension.  Blood pressure presently 147/70 mmHg.    4.  Mechanical fall.    Primary Cardiologist: Dr. Tracee Sierra (initial consultation this admission)    History of Present Illness    Pa García is a 78 year old male admitted after suffering a fall.  He had been having generalized weakness.  Weakness had been progressive over the month prior to admission.  On 1 February 2024, he had been folding laundry and stepped backward and had loss of balance.  " No loss of consciousness.    On interview, Pa is unaware of the rhythm disturbance.  Specifically denies any palpitations.  He reports no chest pain symptoms.  Breathing is comfortable.    Further review of systems is otherwise negative/noncontributory (medical record and 13 point review of systems reviewed as well and pertinent positives noted).    Cardiac Diagnostics   Telemetry (personally reviewed): Atrial flutter    Twelve-lead ECG (personally reviewed) 2 February 2024: Atrial flutter with heart rate of 63 bpm.  Cannot rule out septal infarct.    Twelve-lead ECG (personally reviewed) 1 February 2024: Atrial fibrillation/flutter with heart rate of 95 bpm.  Incomplete right bundle branch block.  Cannot rule out septal infarct.    Twelve-lead ECG (personally reviewed) 8 June 2021: Sinus rhythm with first-degree AV block.  Heart rate 96 bpm.      Medical History  Surgical History   History reviewed. No pertinent past medical history.   Past Surgical History:   Procedure Laterality Date    BACK SURGERY      lumbar laminectomy and fusion/decompression    LUMBAR LAMINECTOMY Bilateral 3/8/2016    Procedure: REVISION DECOMPRESSION BILATERAL L2-3;  Surgeon: Alexis Amado MD;  Location: [facility];  Service:     LUMBAR LAMINECTOMY  3/13/2016    Procedure: Posterior Laminectomy L1-L3 with Dural Repair, and Microscopic Discectomy;  Surgeon: Foster Rae MD;  Location: [facility];  Service:           Family History/Social History/Risk Factors   Patient does not smoke.  Family history reviewed, and   Family History   Problem Relation Age of Onset    Dementia Father     Cancer Maternal Grandmother     Cancer Maternal Grandfather     Cancer Paternal Grandmother     Cancer Paternal Grandfather           Physical Examination   BP (!) 147/70 (BP Location: Right arm)   Pulse 76   Temp 97.6  F (36.4  C) (Oral)   Resp 25   Ht 1.753 m (5' 9\")   Wt 68 kg (150 lb)   SpO2 99%   BMI 22.15 kg/m    Wt " Readings from Last 5 Encounters:   02/02/24 68 kg (150 lb)   03/13/16 84 kg (185 lb 3.2 oz)   03/13/16 84 kg (185 lb 3.2 oz)     Wt Readings from Last 3 Encounters:   02/02/24 68 kg (150 lb)   03/13/16 84 kg (185 lb 3.2 oz)   03/13/16 84 kg (185 lb 3.2 oz)       Intake/Output Summary (Last 24 hours) at 2/2/2024 1012  Last data filed at 2/2/2024 0900  Gross per 24 hour   Intake 1200 ml   Output 1250 ml   Net -50 ml     The patient is alert.. Sclerae are anicteric. Mucosal membranes are moist. Jugular venous pressure is normal. No significant adenopathy/thyromegally appreciated. Lungs are fairly clear with reasonable expansion. On cardiovascular exam, the patient has an irregular S1 and S2. Abdomen is soft and non-tender. Extremities reveal no clubbing, cyanosis, or edema.         Imaging      Chest radiograph 1 February 2024:  Lungs are clear.   No pleural effusion or pneumothorax.   Borderline-enlarged cardiac silhouette, stable.   Normal pulmonary vascularity.   Degenerative and hypertrophic changes about the left proximal humerus may be posttraumatic.     Lab Results     Recent Labs   Lab 02/02/24  0830 02/02/24  0540 02/01/24  2310   WBC  --  8.4 10.8   HGB  --  9.0* 9.6*   MCV  --  99 98   PLT  --  232 263   NA  --  143 143   POTASSIUM  --  4.1 4.6   CHLORIDE  --  112* 110*   CO2  --  23 22   BUN  --  28.9* 31.1*   CR  --  1.57* 1.76*   ANIONGAP  --  8 11   JAYRO  --  8.9 9.6   * 96 107*   ALBUMIN  --   --  4.2   PROTTOTAL  --   --  6.8   BILITOTAL  --   --  0.4   ALKPHOS  --   --  70   ALT  --   --  10   AST  --   --  20       Lab Results   Component Value Date     02/02/2024    CO2 23 02/02/2024    CO2 23 06/10/2021    BUN 28.9 02/02/2024    BUN 22 06/10/2021     Lab Results   Component Value Date    WBC 8.4 02/02/2024    HGB 9.0 02/02/2024    HCT 27.2 02/02/2024    MCV 99 02/02/2024     02/02/2024     Lab Results   Component Value Date    TSH 2.62 02/02/2024         Current Inpatient  Scheduled Medications   Scheduled Meds:   polyethylene glycol  17 g Oral Daily    sodium chloride (PF)  3 mL Intracatheter Q8H     Continuous Infusions:       Medications Prior to Admission   Prior to Admission medications    Medication Sig Start Date End Date Taking? Authorizing Provider   acetaminophen (TYLENOL) 325 MG tablet [ACETAMINOPHEN (TYLENOL) 325 MG TABLET] Take 650 mg by mouth every 4 (four) hours as needed for pain. 3/7/16  Yes Provider, Historical   amLODIPine (NORVASC) 2.5 MG tablet Take 2.5 mg by mouth daily   Yes Unknown, Entered By History   aspirin 81 MG EC tablet [ASPIRIN 81 MG EC TABLET] Take 81 mg by mouth daily. 3/7/16  Yes Provider, Historical   gabapentin (NEURONTIN) 300 MG capsule [GABAPENTIN (NEURONTIN) 300 MG CAPSULE] Take 900 mg by mouth 2 (two) times a day.  3/8/16  Yes Provider, Historical   hydroCHLOROthiazide (HYDRODIURIL) 12.5 MG tablet [HYDROCHLOROTHIAZIDE (HYDRODIURIL) 12.5 MG TABLET] Take 12.5 mg by mouth daily. 6/3/21  Yes Provider, Historical   lansoprazole (PREVACID) 30 MG capsule Take 30 mg by mouth at bedtime 3/7/16  Yes Provider, Historical   metFORMIN (GLUCOPHAGE) 1000 MG tablet [METFORMIN (GLUCOPHAGE) 1000 MG TABLET] Take 1,000 mg by mouth 2 (two) times a day with meals. 4/2/21  Yes Provider, Historical   simvastatin (ZOCOR) 40 MG tablet [SIMVASTATIN (ZOCOR) 40 MG TABLET] Take 40 mg by mouth bedtime. 3/7/16  Yes Provider, Historical   UNABLE TO FIND Take 1 capsule by mouth 2 times daily MEDICATION NAME: Omega XL   Yes Unknown, Entered By History   UNABLE TO FIND [UNABLE TO FIND] Med Name: Super beta prostate - 1 capsule two times a day. 6/8/21  Yes Provider, Historical   UNABLE TO FIND [UNABLE TO FIND] Med Name: Prevagen - 1 capsule daily. 6/8/21  Yes Provider, Historical   valsartan (DIOVAN) 160 MG tablet [VALSARTAN (DIOVAN) 160 MG TABLET] Take 160 mg by mouth daily. 3/3/21  Yes Provider, Historical   vit C,T-Zg-gjovd-lutein-zeaxan (PRESERVISION AREDS-2) 250-90-40-1 mg  cap [VIT C,B-TS-XAUVH-LUTEIN-ZEAXAN (PRESERVISION AREDS-2) 250-90-40-1 MG CAP] Take 1 capsule by mouth 2 (two) times a day. 6/8/21  Yes Provider, Historical   white petrolatum (AQUAPHOR NATURAL HEALING) 41 % Oint [WHITE PETROLATUM (AQUAPHOR NATURAL HEALING) 41 % OINT] Apply to wounds daily as needed. 6/10/21  Yes Provider, Historical          Clinically Significant Risk Factors Present on Admission   Clinically Significant Risk Factors Present on Admission                  # Drug Induced Platelet Defect: home medication list includes an antiplatelet medication       # Hypertension: Noted on problem list       # DMII: A1C = 6.7 % (Ref range: <5.7 %) within past 6 months              Cardiac Arrhythmia: Atrial flutter: Unspecified

## 2024-02-02 NOTE — PHARMACY-ADMISSION MEDICATION HISTORY
Pharmacy Intern Admission Medication History    Admission medication history is complete. The information provided in this note is only as accurate as the sources available at the time of the update.    Information Source(s): Patient and CareEverywhere/SureScripts via in-person    Pertinent Information:   Pt knew most meds but not strengths, has a paper in his wallet with medication names and strengths however it is likely out of date as it doesn't reflect SureScripts     Changes made to PTA medication list:  Added:   Amlodipine, Omega XL   Deleted: None  Changed: None       Allergies reviewed with patient and updates made in EHR: yes    Medication History Completed By: Jonathan Jamil 2/2/2024 8:18 AM    PTA Med List   Medication Sig Last Dose    acetaminophen (TYLENOL) 325 MG tablet [ACETAMINOPHEN (TYLENOL) 325 MG TABLET] Take 650 mg by mouth every 4 (four) hours as needed for pain. Unknown at PRN    amLODIPine (NORVASC) 2.5 MG tablet Take 2.5 mg by mouth daily 2/1/2024 at AM    aspirin 81 MG EC tablet [ASPIRIN 81 MG EC TABLET] Take 81 mg by mouth daily. 2/1/2024 at AM    gabapentin (NEURONTIN) 300 MG capsule [GABAPENTIN (NEURONTIN) 300 MG CAPSULE] Take 900 mg by mouth 2 (two) times a day.  2/1/2024 at AM    hydroCHLOROthiazide (HYDRODIURIL) 12.5 MG tablet [HYDROCHLOROTHIAZIDE (HYDRODIURIL) 12.5 MG TABLET] Take 12.5 mg by mouth daily. 2/1/2024 at AM    lansoprazole (PREVACID) 30 MG capsule [LANSOPRAZOLE (PREVACID) 30 MG CAPSULE] Take 30 mg by mouth daily. 1/31/2024 at PM    metFORMIN (GLUCOPHAGE) 1000 MG tablet [METFORMIN (GLUCOPHAGE) 1000 MG TABLET] Take 1,000 mg by mouth 2 (two) times a day with meals. 2/1/2024 at AM    simvastatin (ZOCOR) 40 MG tablet [SIMVASTATIN (ZOCOR) 40 MG TABLET] Take 40 mg by mouth bedtime. 1/31/2024 at HS    UNABLE TO FIND Take 1 capsule by mouth 2 times daily MEDICATION NAME: Omega XL 2/1/2024 at AM    UNABLE TO FIND [UNABLE TO FIND] Med Name: Super beta prostate - 1 capsule two  times a day. 2/1/2024 at AM    UNABLE TO FIND [UNABLE TO FIND] Med Name: Prevagen - 1 capsule daily. 2/1/2024 at AM    valsartan (DIOVAN) 160 MG tablet [VALSARTAN (DIOVAN) 160 MG TABLET] Take 160 mg by mouth daily. 2/1/2024 at AM    vit C,E-Fi-uesgv-lutein-zeaxan (PRESERVISION AREDS-2) 250-90-40-1 mg cap [VIT C,K-YW-GBECJ-LUTEIN-ZEAXAN (PRESERVISION AREDS-2) 250-90-40-1 MG CAP] Take 1 capsule by mouth 2 (two) times a day. 2/1/2024 at AM    white petrolatum (AQUAPHOR NATURAL HEALING) 41 % Oint [WHITE PETROLATUM (AQUAPHOR NATURAL HEALING) 41 % OINT] Apply to wounds daily as needed. Unknown at PRN

## 2024-02-02 NOTE — ED PROVIDER NOTES
EMERGENCY DEPARTMENT ENCOUNTER      NAME: Pa García  AGE: 78 year old male  YOB: 1945  MRN: [id number]  EVALUATION DATE & TIME: 2/1/2024 10:56 PM    PCP: Lars Rajan    ED PROVIDER: Francie Delvalle M.D.      Chief Complaint   Patient presents with    Fall         FINAL IMPRESSION:  1. ROXIE (acute kidney injury) (H24)    2. Dehydration    3. Generalized weakness    4. Atypical atrial flutter (H)    5. Elevated troponin        MEDICAL DECISION MAKING:    Pertinent Labs & Imaging studies reviewed. (See chart for details)  ED Course as of 02/02/24 0242   Thu Feb 01, 2024 2307 Currently obtaining rectal temperature.  Heart rate in the 90s.  Oxygen saturations appropriate on room air.  Patient is coming in by EMS after a fall.  Says that he tripped and lost his balance when he was doing laundry.  Granddaughter states he is just seemed weaker throughout the day today.  Was unable to get up from the floor, was on the ground from somewhere between 7 PM and 9.  Then was able to call for EMS.  They found him lying on the ground, were able to get him up and transferred to a stretcher.  He is got a history of bilateral lower extremity weakness secondary to complications from previous back surgery and that has been chronic and unchanged with the exception that he just feels more weak today.  He says he could not get up from the floor just because he was feeling weak.  Does note when he fell he did seem to fall backwards.  Did not strike his head.  No loss of consciousness.  Has pain in his bilateral hips that is chronic and unchanged.   2308 Physical exam for patient here alert and answering all questions appropriately.  Skin appears flushed, warm to the touch.  Dry mucous membranes.  Heart is mildly tachycardic but with regular rhythm.  Lungs clear to auscultation bilaterally.  No abdominal tenderness to palpation.  No midline neck tenderness and no midline back tenderness no step-offs.  No evidence for  any injury or trauma to the head.    Oral temperature done by EMS was 98 however patient appears flushed, has warm skin so we will get a rectal temperature on him here.  Started sepsis labs for patient with concern for possible infectious etiology.  With his previous back surgery he has complication of neurogenic bladder.  He does note that his stream has been less but states that his stream is always decreased.  Seems that he does not self catheterize.  Will get a urine sample on him here as well.  His initial EKG on arrival shows sinus rhythm with AV dissociation and occasional PVCs.  Complete right bundle branch block.  No signs for acute ischemia.  QTc is 439, QRS 92.  NV unmeasurable, occasionally does flip into a junctional rhythm.  Previous EKG from June 8, 2021 shows sinus rhythm with first-degree AV block.  Junctional rhythm with PVCs new compared with previous.   Fri Feb 02, 2024   0004 Labs back for patient here with hemoglobin 9.6.  Previous hemoglobin for patient 10.9 from [facility] on 4/6/2022.  Patient not reporting any dark or tarry stools.  His troponin here is elevated at 85.  No chest pain.  EKG is nonischemic.  Pending here 1.76, seems baseline is about 1.0.  Lactic acid, procalcitonin within normal limit.  Rectal temperature does not reveal any evidence of fever.  His heart rate improved with fluids.  Still awaiting urinalysis.  Will hold off antibiotics at this time.  No leukocytosis   0221 Patient's urinalysis here does not show any evidence of infection.  We did put in a Gilbert catheter, again patient with history of neurogenic bladder but was having some retention with inability to urinate and greater than 500 cc in his bladder.  No evidence of infection there.  Again his labs to come back with some evidence for dehydration certainly with ROXIE.  Heart rate is significantly improved after fluids.  He did have an episode here where suddenly his rhythm did change and he went into a a flutter with  variable AV block.  Repeat EKG here shows a flutter with variable AV block.  No signs again for acute ischemia.  Given his weakness, fall inability to get up, ROXIE, changing rhythm, elevated troponin at 85 with EKG not showing any signs of acute ischemia and patient without symptoms at this time will call and speak with hospitalist for admission.         Medical Decision Making  Obtained supplemental history:Supplemental history obtained?: Documented in chart, Family Member/Significant Other, and EMS  Reviewed external records: External records reviewed?: Documented in chart  Care impacted by chronic illness:Diabetes and Hypertension  Care significantly affected by social determinants of health:Access to Medical Care  Did you consider but not order tests?: Work up considered but not performed and documented in chart, if applicable  Did you interpret images independently?: Independent interpretation of ECG and images noted in documentation, when applicable.  Consultation discussion with other provider:Did you involve another provider (consultant, MH, pharmacy, etc.)?: No  Admit.      Critical care: 0 minutes excluding separately billable procedures.  Includes bedside management, time reviewing test results, review of records, discussing the case with staff, documenting the medical record and time spent with family members (or surrogate decision makers) discussing specific treatment issues.          ED COURSE:  11:04 PM I met with the patient, obtained history, performed an initial exam, and discussed options and plan for diagnostics and treatment here in the ED.     The importance of close follow up was discussed. We reviewed warning signs and symptoms, and I instructed Mr. García to return to the emergency department immediately if he develops any new or worsening symptoms. I provided additional verbal discharge instructions. Mr. García expressed understanding and agreement with this plan of care, his questions were  answered, and he was discharged in stable condition.     MEDICATIONS GIVEN IN THE EMERGENCY:  Medications   sodium chloride 0.9% BOLUS 1,000 mL (0 mLs Intravenous Stopped 2/2/24 0003)       NEW PRESCRIPTIONS STARTED AT TODAY'S ER VISIT:  New Prescriptions    No medications on file          =================================================================    HPI    Patient information was obtained from: The patient and EMS    Use of : N/A         Pa García is a 78 year old male who presents with fall and weakness.    The patient reports he was doing laundry tonight and fell backwards due to losing his balance. EMS states he fell between 7 PM and 9 PM. He was unable to get up because he felt too weak and called the ambulance. His granddaughter also noticed he seems more generally weak when walking. He denies any loss of conscious or head trauma from the fall. He is not on blood thinners. He reports having a chronic history of bilateral hip pain from bursitis and back pain from previous spinal cord injury, but no worsening pain since the fall. He had an oral temp of 98.9 en route to the ER. He uses a walker to get around. The patient states he lost about 7-8 pounds unexpectedly within a few weeks. He reports that he has an abnormal peeing stream since surgery.    Otherwise, the patient denied cough, shortness of breath, chills, and any other medical complaints or concerns at this time.    REVIEW OF SYSTEMS   Review of Systems   Constitutional:  Negative for chills.   Respiratory:  Negative for cough and shortness of breath.    Musculoskeletal:  Positive for arthralgias (chronic hip pain bilaterally) and back pain (chronic back pain).   Neurological:  Positive for weakness.   All other systems reviewed and are negative.        PAST MEDICAL HISTORY:  History reviewed. No pertinent past medical history.    PAST SURGICAL HISTORY:  Past Surgical History:   Procedure Laterality Date    BACK SURGERY      lumbar  laminectomy and fusion/decompression    LUMBAR LAMINECTOMY Bilateral 3/8/2016    Procedure: REVISION DECOMPRESSION BILATERAL L2-3;  Surgeon: Alexis Amado MD;  [place] [facility] [place]:     LUMBAR LAMINECTOMY  3/13/2016    Procedure: Posterior Laminectomy L1-L3 with Dural Repair, and Microscopic Discectomy;  Surgeon: Foster Rae MD;  [place] [facility] [place]:        CURRENT MEDICATIONS:    No current facility-administered medications for this encounter.    Current Outpatient Medications:     acetaminophen (TYLENOL) 325 MG tablet, [ACETAMINOPHEN (TYLENOL) 325 MG TABLET] Take 650 mg by mouth every 4 (four) hours as needed for pain., Disp: , Rfl:     aspirin 81 MG EC tablet, [ASPIRIN 81 MG EC TABLET] Take 81 mg by mouth daily., Disp: , Rfl:     gabapentin (NEURONTIN) 300 MG capsule, [GABAPENTIN (NEURONTIN) 300 MG CAPSULE] Take 900 mg by mouth 2 (two) times a day. , Disp: , Rfl:     hydroCHLOROthiazide (HYDRODIURIL) 12.5 MG tablet, [HYDROCHLOROTHIAZIDE (HYDRODIURIL) 12.5 MG TABLET] Take 12.5 mg by mouth daily., Disp: , Rfl:     lansoprazole (PREVACID) 30 MG capsule, [LANSOPRAZOLE (PREVACID) 30 MG CAPSULE] Take 30 mg by mouth daily., Disp: , Rfl:     metFORMIN (GLUCOPHAGE) 1000 MG tablet, [METFORMIN (GLUCOPHAGE) 1000 MG TABLET] Take 1,000 mg by mouth 2 (two) times a day with meals., Disp: , Rfl:     simvastatin (ZOCOR) 40 MG tablet, [SIMVASTATIN (ZOCOR) 40 MG TABLET] Take 40 mg by mouth bedtime., Disp: , Rfl:     UNABLE TO FIND, [UNABLE TO FIND] Med Name: Super beta prostate - 1 capsule two times a day., Disp: , Rfl:     UNABLE TO FIND, [UNABLE TO FIND] Med Name: Prevagen - 1 capsule daily., Disp: , Rfl:     valsartan (DIOVAN) 160 MG tablet, [VALSARTAN (DIOVAN) 160 MG TABLET] Take 160 mg by mouth daily., Disp: , Rfl:     vit C,V-Xz-lyyhn-lutein-zeaxan (PRESERVISION AREDS-2) 250-90-40-1 mg cap, [VIT C,L-LW-TGAYN-LUTEIN-ZEAXAN (PRESERVISION AREDS-2) 250-90-40-1 MG CAP] Take 1 capsule by  "mouth 2 (two) times a day., Disp: , Rfl:     white petrolatum (AQUAPHOR NATURAL HEALING) 41 % Oint, [WHITE PETROLATUM (AQUAPHOR NATURAL HEALING) 41 % OINT] Apply to wounds daily as needed., Disp: 1 Tube, Rfl: 0    ALLERGIES:  No Known Allergies    FAMILY HISTORY:  Family History   Problem Relation Age of Onset    Dementia Father     Cancer Maternal Grandmother     Cancer Maternal Grandfather     Cancer Paternal Grandmother     Cancer Paternal Grandfather        SOCIAL HISTORY:   Social History     Socioeconomic History    Marital status: Single   Tobacco Use    Smoking status: Never    Smokeless tobacco: Never   Substance and Sexual Activity    Alcohol use: Yes     Comment: Alcoholic Drinks/day: occ    Drug use: No       PHYSICAL EXAM:    Vitals: BP (!) 170/78   Pulse 63   Temp 97.1  F (36.2  C) (Rectal)   Resp 18   Ht 1.753 m (5' 9\")   Wt 68 kg (150 lb)   SpO2 99%   BMI 22.15 kg/m     General:. Alert and interactive, comfortable appearing.  HENT: Oropharynx without erythema or exudates. Dry mucous membranes .  TMs clear bilaterally. No evidence for any injury or trauma tot he head.  Eyes: Pupils mid-sized and equally reactive.   Neck: Full AROM.  No midline tenderness to palpation.  Cardiovascular: mildly tachycardic but with regular rhythm. Peripheral pulses 2+ bilaterally.  Chest/Pulmonary: Normal work of breathing. Lung sounds clear and equal throughout, no wheezes or crackles. No chest wall tenderness or deformities.  Abdomen: Soft, nondistended. Nontender without guarding or rebound.  Back/Spine: No CVA or midline tenderness.  Extremities: Normal ROM of all major joints. No lower extremity edema.   Skin: Skin appears flushed, warm to the touch.  Normal skin color.   Neuro: Speech clear. CNs grossly intact. Moves all extremities appropriately. Strength and sensation grossly intact to all extremities.   Psych: Normal affect/mood, cooperative, memory appropriate.     LAB:  All pertinent labs reviewed and " interpreted.  Labs Ordered and Resulted from Time of ED Arrival to Time of ED Departure   COMPREHENSIVE METABOLIC PANEL - Abnormal       Result Value    Sodium 143      Potassium 4.6      Carbon Dioxide (CO2) 22      Anion Gap 11      Urea Nitrogen 31.1 (*)     Creatinine 1.76 (*)     GFR Estimate 39 (*)     Calcium 9.6      Chloride 110 (*)     Glucose 107 (*)     Alkaline Phosphatase 70      AST 20      ALT 10      Protein Total 6.8      Albumin 4.2      Bilirubin Total 0.4     TROPONIN T, HIGH SENSITIVITY - Abnormal    Troponin T, High Sensitivity 85 (*)    ROUTINE UA WITH MICROSCOPIC REFLEX TO CULTURE - Abnormal    Color Urine Light Yellow      Appearance Urine Clear      Glucose Urine Negative      Bilirubin Urine Negative      Ketones Urine Negative      Specific Gravity Urine 1.011      Blood Urine Negative      pH Urine 5.0      Protein Albumin Urine 10 (*)     Urobilinogen Urine <2.0      Nitrite Urine Negative      Leukocyte Esterase Urine Negative      Bacteria Urine Few (*)     Mucus Urine Present (*)     RBC Urine 1      WBC Urine 1      Squamous Epithelials Urine <1      Hyaline Casts Urine 8 (*)    CBC WITH PLATELETS AND DIFFERENTIAL - Abnormal    WBC Count 10.8      RBC Count 2.96 (*)     Hemoglobin 9.6 (*)     Hematocrit 29.1 (*)     MCV 98      MCH 32.4      MCHC 33.0      RDW 13.5      Platelet Count 263      % Neutrophils 83      % Lymphocytes 8      % Monocytes 8      % Eosinophils 0      % Basophils 0      % Immature Granulocytes 1      NRBCs per 100 WBC 0      Absolute Neutrophils 9.0 (*)     Absolute Lymphocytes 0.8      Absolute Monocytes 0.9      Absolute Eosinophils 0.0      Absolute Basophils 0.0      Absolute Immature Granulocytes 0.1      Absolute NRBCs 0.0     PROCALCITONIN - Normal    Procalcitonin 0.29     LACTIC ACID WHOLE BLOOD - Normal    Lactic Acid 1.5     INFLUENZA A/B, RSV, & SARS-COV2 PCR - Normal    Influenza A PCR Negative      Influenza B PCR Negative      RSV PCR Negative       SARS CoV2 PCR Negative     TROPONIN T, HIGH SENSITIVITY   BLOOD CULTURE   BLOOD CULTURE       RADIOLOGY:  XR Chest Port 1 View   Final Result   IMPRESSION: Lungs are clear. No pleural effusion or pneumothorax. Borderline-enlarged cardiac silhouette, stable. Normal pulmonary vascularity. Degenerative and hypertrophic changes about the left proximal humerus may be posttraumatic.          EKG:    Initial EKG on arrival shows sinus rhythm with AV dissociation and occasional PVCs. Complete right bundle branch block. No signs for acute ischemia. QTc is 439, QRS 92. ND unmeasurable, occasionally does flip into a junctional rhythm. Previous EKG from June 8, 2021 shows sinus rhythm with first-degree AV block. Junctional rhythm with PVCs new compared with previous.     PROCEDURES:   None      I, Can Isaacs, am serving as a scribe to document services personally performed by Dr. Francie Delvalle  based on my observation and the provider's statements to me. I, Francie Delvalle MD attest that Can Isaacs is acting in a scribe capacity, has observed my performance of the services and has documented them in accordance with my direction.      Francie Delvalle M.D.  Emergency Medicine  [facility] EMERGENCY ROOM  [address] [id number] [phone]  Dept: [phone]     Francie Delvalle MD  02/02/24 0247

## 2024-02-02 NOTE — PROGRESS NOTES
"   02/02/24 4395   Appointment Info   Signing Clinician's Name / Credentials (PT) Francine Cortez, PT, DPT   Quick Adds   Quick Adds Certification   Living Environment   People in Home alone   Current Living Arrangements house   Home Accessibility stairs to enter home;stairs within home   Number of Stairs, Main Entrance 1   Stair Railings, Main Entrance none   Number of Stairs, Within Home, Primary greater than 10 stairs  (12)   Stair Railings, Within Home, Primary railing on left side (ascending)   Self-Care   Equipment Currently Used at Home walker, rolling  (4ww & hurrycane- using all the time)   Fall history within last six months yes   Number of times patient has fallen within last six months 4   Activity/Exercise/Self-Care Comment pt reporting being independent with functional mobility prior to hospital stay   General Information   Onset of Illness/Injury or Date of Surgery 02/01/24   Referring Physician Rosalinda Kohli MD   Patient/Family Therapy Goals Statement (PT) Return to Home   Pertinent History of Current Problem (include personal factors and/or comorbidities that impact the POC) Per Chart Review -\"78 year old male who has a history of HTN, T2DM, hx of complications from back surgery with leg weakness, neurogenic bladder, CKD III and is presenting for generalized weakness and mechanical fall admitted on 2/1/24 found to have ROXIE on CKD and new a flutter.\"   Existing Precautions/Restrictions fall   General Observations R AFO d/t foot drop   Range of Motion (ROM)   Range of Motion ROM deficits secondary to weakness   Strength (Manual Muscle Testing)   Strength (Manual Muscle Testing) Deficits observed during functional mobility   Bed Mobility   Bed Mobility supine-sit   Supine-Sit Cross (Bed Mobility) supervision;verbal cues;contact guard   Impairments Contributing to Impaired Bed Mobility pain;decreased strength;impaired balance   Transfers   Transfers sit-stand transfer   Sit-Stand Transfer " "  Sit-Stand [place] (Transfers) supervision;verbal cues;contact guard   Assistive Device (Sit-Stand Transfers) walker, front-wheeled   Gait/Stairs (Locomotion)   [place] Level (Gait) supervision;verbal cues;contact guard   Assistive Device (Gait) walker, front-wheeled   Distance in Feet (Gait) 5   Pattern (Gait) step-through;swing-through   Deviations/Abnormal Patterns (Gait) gait speed decreased   Clinical Impression   Criteria for Skilled Therapeutic Intervention Yes, treatment indicated   PT Diagnosis (PT) Impaired functional mobility   Influenced by the following impairments weakness, decreased ROM, impaired balance, pain   Functional limitations due to impairments stairs, transfers, gait   Clinical Presentation (PT Evaluation Complexity) stable   Clinical Presentation Rationale pt presents as medically diagnosed   Clinical Decision Making (Complexity) low complexity   Planned Therapy Interventions (PT) balance training;bed mobility training;gait training;home exercise program;stair training;strengthening;stretching;ROM (range of motion);transfer training   Risk & Benefits of therapy have been explained evaluation/treatment results reviewed;patient   PT Total Evaluation Time   PT Eval, Low Complexity Minutes (78039) 10   Therapy Certification   Start of care date 02/02/24   Certification date from 02/02/24   Certification date to 03/03/24   Medical Diagnosis Per Chart Review -\"78 year old male who has a history of HTN, T2DM, hx of complications from back surgery with leg weakness, neurogenic bladder, CKD III and is presenting for generalized weakness and mechanical fall admitted on 2/1/24 found to have ROXIE on CKD and new a flutter.\"   Physical Therapy Goals   PT Frequency Daily   PT Predicted Duration/Target Date for Goal Attainment 02/09/24   PT Goals Transfers;Gait;Stairs   PT: Transfers Supervision/stand-by assist;Sit to/from stand;Assistive device   PT: Gait Supervision/stand-by assist;Rolling " walker;Greater than 200 feet   PT: Stairs Supervision/stand-by assist;Assistive device;Greater than 10 stairs;Rail on left   Interventions   Interventions Quick Adds Gait Training;Therapeutic Activity   Therapeutic Activity   Therapeutic Activities: dynamic activities to improve functional performance Minutes (44262) 12   Symptoms Noted During/After Treatment Fatigue   Treatment Detail/Skilled Intervention Additional Sit to/from stands: cueing for safety/technique/FWW management/hand placement on stable surface, CGA to Min A- education for hand placement; sit to supine: cueing for safety/technique, CGA; sitting balance: cueing for safety/posture - Initially Min A but progressed to CGA, dependent with donning shoe/R AFO/brief initially; Education of FWW sizing - modified FWW   Gait Training   Gait Training Minutes (34464) 11   Symptoms Noted During/After Treatment (Gait Training) fatigue   Treatment Detail/Skilled Intervention cueing for safety/FWW management/posture/increasing step length - slow cautious gait; seated rest break   Distance in Feet 75 x2   [place] Level (Gait Training) other (see comments)  (CGA to Kris)   Physical Assistance Level (Gait Training) supervision;verbal cues   Weight Bearing (Gait Training) full weight-bearing   Assistive Device (Gait Training) rolling walker   Pattern Analysis (Gait Training) swing-to gait   Gait Analysis Deviations decreased dwight;decreased step length   Impairments (Gait Analysis/Training) balance impaired;strength decreased;pain   PT Discharge Planning   PT Plan gait as deo, stairs, transfers, LE therex   PT Discharge Recommendation (DC Rec) Transitional Care Facility   PT Rationale for DC Rec pt requiring assist for functional mobility - pending safe stair trial anticipate pt may be able to return to home with home PT. However current recommendation is TCU for further progressing strength/balance/activity tolerance d/t assistance required for mobility & pt  "currently living alone   PT Brief overview of current status CGA to Min A 75 x2 gait with FWW & transfers & bed mobility   PT Equipment Needed at Discharge walker, rolling   Total Session Time   Timed Code Treatment Minutes 23   Total Session Time (sum of timed and untimed services) 33     [facility]  OUTPATIENT PHYSICAL THERAPY EVALUATION  PLAN OF TREATMENT FOR OUTPATIENT REHABILITATION  (COMPLETE FOR INITIAL CLAIMS ONLY)  Patient's Last Name, First Name, M.I.  YOB: 1945  Pa García                        Provider's Name  [facility] Medical Record No.  [id number]                             Onset Date:  02/01/24   Start of Care Date:  02/02/24   Type:     _X_PT   ___OT   ___SLP Medical Diagnosis:  Per Chart Review -\"78 year old male who has a history of HTN, T2DM, hx of complications from back surgery with leg weakness, neurogenic bladder, CKD III and is presenting for generalized weakness and mechanical fall admitted on 2/1/24 found to have ROXIE on CKD and new a flutter.\"              PT Diagnosis:  Impaired functional mobility Visits from SOC:  1     See note for plan of treatment, functional goals and certification details    I CERTIFY THE NEED FOR THESE SERVICES FURNISHED UNDER        THIS PLAN OF TREATMENT AND WHILE UNDER MY CARE     (Physician co-signature of this document indicates review and certification of the therapy plan).              "

## 2024-02-02 NOTE — PROGRESS NOTES
02/02/24 1330   Appointment Info   Signing Clinician's Name / Credentials (OT) Kaitlin Griffin, ULISES/L, CLT   Rehab Comments (OT) OT eval   Living Environment   People in Home alone   Current Living Arrangements house   Self-Care   Usual Activity Tolerance moderate   Current Activity Tolerance moderate   Equipment Currently Used at Home grab bar, tub/shower;shower chair  (sink near toilet)   Fall history within last six months yes   Number of times patient has fallen within last six months 4   Activity/Exercise/Self-Care Comment Pt is typically independent with ADL at home.   General Information   Onset of Illness/Injury or Date of Surgery 02/01/24   Referring Physician Dr. Nayak   Patient/Family Therapy Goal Statement (OT) hopes to return home   Additional Occupational Profile Info/Pertinent History of Current Problem 78 year old male who has a history of HTN, T2DM, hx of complications from back surgery with leg weakness, neurogenic bladder, CKD III and is presenting for generalized weakness and mechanical fall admitted on 2/1/24 found to have new a flutter.   Existing Precautions/Restrictions fall;other (see comments)  (AFO for R LE)   Cognitive Status Examination   Orientation Status orientation to person, place and time   Affect/Mental Status (Cognitive) WFL   Visual Perception   Visual Impairment/Limitations WFL   Sensory   Sensory Quick Adds sensation intact   Pain Assessment   Patient Currently in Pain No   Posture   Posture not impaired   Range of Motion Comprehensive   General Range of Motion no range of motion deficits identified   Strength Comprehensive (MMT)   Comment, General Manual Muscle Testing (MMT) Assessment generalized weakness   Muscle Tone Assessment   Muscle Tone Quick Adds No deficits were identified   Coordination   Upper Extremity Coordination No deficits were identified   Bed Mobility   Comment (Bed Mobility) min A   Transfers   Transfer Comments CGA   Balance   Balance Comments  decreased   Activities of Daily Living   BADL Assessment/Intervention lower body dressing;grooming;toileting   Lower Body Dressing Assessment/Training   [place] Level (Lower Body Dressing) moderate assist (50% patient effort)   Grooming Assessment/Training   [place] Level (Grooming) contact guard assist   Toileting   [place] Level (Toileting) contact guard assist   Clinical Impression   Criteria for Skilled Therapeutic Interventions Met (OT) Yes, treatment indicated   OT Diagnosis decreased ADL independence/safety.   Influenced by the following impairments elevated troponin   OT Problem List-Impairments impacting ADL activity tolerance impaired;balance;flexibility;mobility;strength   Assessment of Occupational Performance 3-5 Performance Deficits   Identified Performance Deficits dressing, toileting, bathing, functional mobility, bed mobility   Planned Therapy Interventions (OT) ADL retraining   Clinical Decision Making Complexity (OT) detailed assessment/moderate complexity   Risk & Benefits of therapy have been explained care plan/treatment goals reviewed;patient   OT Total Evaluation Time   OT Eval, Moderate Complexity Minutes (68413) 10   OT Goals   Therapy Frequency (OT) Daily   OT Predicted Duration/Target Date for Goal Attainment 02/08/24   OT Goals Lower Body Dressing;Toilet Transfer/Toileting   OT: Lower Body Dressing Modified independent   OT: Toilet Transfer/Toileting Modified independent;toilet transfer;cleaning and garment management;using adaptive equipment   Interventions   Interventions Quick Adds Self-Care/Home Management   Self-Care/Home Management   Self-Care/Home Mgmt/ADL, Compensatory, Meal Prep Minutes (84289) 15   Symptoms Noted During/After Treatment (Meal Preparation/Planning Training) fatigue   Treatment Detail/Skilled Intervention All functional transfers with CGA-SBA/FWW/cues for tech/hand placement including bed, toilet. Walked to bathroom with CGA-SBA/FWW/cues for  posture/safety with FWW. Donned AFO/shoe with max A. Walked to sink and performed g/h tasks with CGA-SBA/FWW/cues for posture. Pt stood at sink with sink for support and completed g/h tasks including wash hands. Walked back to the bed with CGA-SBA/FWW/cues for posture/safety. Doffed AFO/shoes with CGA. Needed SBA for sit to supine and cues for positioning in bed. Increased time/effort needed for all tasks d/t increased pain/weakness. Educ in safe tech for functional transfers and ADL. Role of therapy explained. All questions answered.   OT Discharge Planning   OT Plan LB dressing with AFO; toileting; increase endur-stand at sink   OT Discharge Recommendation (DC Rec) (S)  home with home care occupational therapy   OT Rationale for DC Rec lives alone/hx of falls   OT Brief overview of current status SBA/CGA for func mob with FWW   Total Session Time   Timed Code Treatment Minutes 15   Total Session Time (sum of timed and untimed services) 25    [facility]  OUTPATIENT OCCUPATIONAL THERAPY  EVALUATION  PLAN OF TREATMENT FOR OUTPATIENT REHABILITATION  (COMPLETE FOR INITIAL CLAIMS ONLY)  Patient's Last Name, First Name, M.I.  YOB: 1945  Pa García                          Provider's Name  [facility] Medical Record No.  [id number]                             Onset Date:  02/01/24   Start of Care Date:      Type:     ___PT   _X_OT   ___SLP Medical Diagnosis:                       OT Diagnosis:  decreased ADL independence/safety. Visits from SOC:  1     See note for plan of treatment, functional goals and certification details    I CERTIFY THE NEED FOR THESE SERVICES FURNISHED UNDER        THIS PLAN OF TREATMENT AND WHILE UNDER MY CARE     (Physician co-signature of this document indicates review and certification of the therapy plan).

## 2024-02-02 NOTE — PROGRESS NOTES
[facility]    Progress Note - Hospitalist Service       Date of Admission:  2/1/2024    Assessment & Plan   Pa García is a 78 year old male who has a history of HTN, T2DM, hx of complications from back surgery with leg weakness, neurogenic bladder, CKD III and is presenting for generalized weakness and mechanical fall admitted on 2/1/24 found to have new a flutter.      Generalized weakness  Mechanical fall  Intermittent new a flutter  Elevated troponin  Patient presenting for progressively worsening generalized weakness and mechanical fall.  No LOS, did not hit head.  No associated symptoms.  No chest pain or palpitations. Presented afebrile, vitally stable, satting well on RA.  CMP with BUN 31.1, creatinine 1.76, GFR 39.  Otherwise normal.  Glucose 107.  Lactic acid 1.5.  Pro-Julio negative.  Troponin 85, 70 on repeat.  CBC with hemoglobin 9.6.  UA negative for infection.  Negative for COVID-19, influenza A/B, RSV.  CXR without acute pathology.  Blood cultures obtained.  EKG showing a flutter and variable AV block.  A flutter appears to be new. He remains to be asymptomatic.  -Cardiology consulted, appreciate recommendations  -Cardiac telemetry  -Follow-up echo  -Continue PTA aspirin 81 mg. Patient declined anticoagulation due to falls   -PT/OT consulted     CKD stage III  On presentation creatinine 1.76, BUN 31.1. His new baseline Cr in 1.5-1.7 range  -Continue to monitor      Normocytic anemia  9.6 on admission with normal MCV.  No signs of gross bleed, hemodynamically stable.  Likely related to CKD.   -Continue to monitor     T2DM  A1c 6.7 takes metformin 1000 twice daily  -Continued metformin twice daily    HTN  Patient reports taking morning medications.  -Continue PTA amlodipine, hydrochlorothiazide, valsartan     HLD  Decreased PTA simvastatin from 40mg to 20 mg as amlodipine and simvastatin increases simvastatin exposure and increased risk of myopathy or rhabdo.     Chronic  hip pain and low back pain  Trochanteric bursitis   -Continue PTA gabapentin 300 mg twice daily  -Patient agreeable with Toradol injection, plan to do it tomorrow        Diet: Moderate Consistent Carb (60 g CHO per Meal) Diet    DVT Prophylaxis: Pneumatic Compression Devices  Gilbert Catheter: Not present  Fluids: PO  Lines: None     Cardiac Monitoring: ACTIVE order. Indication: Tachyarrhythmias, acute (48 hours)  Code Status: Full Code      Clinically Significant Risk Factors Present on Admission                # Drug Induced Platelet Defect: home medication list includes an antiplatelet medication   # Hypertension: Noted on problem list     # DMII: A1C = 6.7 % (Ref range: <5.7 %) within past 6 months               Disposition Plan      Expected Discharge Date: 02/04/2024                The patient's care was discussed with the Attending Physician, Dr. Nayak .    Socorro Felix MD PGY2  Hospitalist Service  [facility]  Securely message with [employer] (more info)  Text page via [employer]fix Paging/Directory   ______________________________________________________________________    Interval History   Admitted overnight.  Patient denying any chest pain, palpitations, shortness of breath, lightheadedness.  Reports chronic back pain that is stable.  Not reporting constipation and would want some stool softeners.    Physical Exam   Vital Signs: Temp: 97.6  F (36.4  C) Temp src: Oral BP: (!) 147/70 Pulse: 76   Resp: 25 SpO2: 99 % O2 Device: None (Room air)    Weight: 150 lbs 0 oz    Constitutional: awake, alert, cooperative, no apparent distress, and appears stated age  ENT: Moist mucous membranes  Respiratory: No increased work of breathing, good air exchange, clear to auscultation bilaterally, no crackles or wheezing  Cardiovascular: Regular rate and rhythm, normal S1 and S2, no S3 or S4, and no murmur noted  GI: No scars, normal bowel sounds, soft, non-distended, non-tender  Musculoskeletal: There is no  redness, warmth, or swelling of the joints.    Medical Decision Making       Please see A&P for additional details of medical decision making.      Data     I have personally reviewed the following data over the past 24 hrs:    8.4  \   9.0 (L)   / 232     143 112 (H) 28.9 (H) /  104 (H)   4.1 23 1.57 (H) \     ALT: 10 AST: 20 AP: 70 TBILI: 0.4   ALB: 4.2 TOT PROTEIN: 6.8 LIPASE: N/A     Trop: 70 (H) BNP: N/A     TSH: 2.62 T4: N/A A1C: 6.7 (H)     Procal: 0.29 CRP: N/A Lactic Acid: 1.5         Imaging results reviewed over the past 24 hrs:   Recent Results (from the past 24 hour(s))   XR Chest Port 1 View    Narrative    EXAM: XR CHEST PORT 1 VIEW  LOCATION: [facility]  DATE: 2/1/2024    INDICATION: weakness  COMPARISON: 06/08/2021      Impression    IMPRESSION: Lungs are clear. No pleural effusion or pneumothorax. Borderline-enlarged cardiac silhouette, stable. Normal pulmonary vascularity. Degenerative and hypertrophic changes about the left proximal humerus may be posttraumatic.

## 2024-02-02 NOTE — UTILIZATION REVIEW
"Admission Status; Secondary Review Determination     Under the authority of the Utilization Management Committee, the utilization review process indicated a secondary review on Pa García.  The review outcome is based on review of the medical records, discussions with staff, and applying clinical experience noted on the date of the review.     (x) Observation Status Appropriate - This patient does not meet hospital inpatient criteria and is placed in observation status. If this patient's primary payer is Medicare and was admitted as an inpatient, Condition Code 44 should be used and patient status changed to \"observation\".     RATIONALE FOR DETERMINATION    78 year old male who has a history of HTN, hx of complications from back surgery with leg weakness, neurogenic bladder, CKD III who presented to ER for generalized weakness and mechanical fall and admitted for ROXIE and intermitted new A flutter, cardiology consult , pending echo, no further work up is needed, HR is controled , creatinines is improving , Off IV fluid     The severity of illness, intensity of service provided, expected LOS and risk for adverse outcome make the care appropriate for further observation; however, doesn't meet criteria for hospital inpatient admission. Senior resident is notified of this determination and agrees with downgrade of status.      The information on this document is developed by the utilization review team in order for the business office to ensure compliance.  This only denotes the appropriateness of proper admission status and does not reflect the quality of care rendered.         The definitions of Inpatient Status and Observation Status used in making the determination above are those provided in the CMS Coverage Manual, Chapter 1 and Chapter 6, section 70.4.      Sincerely,  Richy Leach MD  Utilization Review  Physician Advisor  [facility]  "

## 2024-02-03 ENCOUNTER — APPOINTMENT (OUTPATIENT)
Dept: OCCUPATIONAL THERAPY | Facility: CLINIC | Age: 79
End: 2024-02-03
Payer: COMMERCIAL

## 2024-02-03 ENCOUNTER — APPOINTMENT (OUTPATIENT)
Dept: PHYSICAL THERAPY | Facility: CLINIC | Age: 79
End: 2024-02-03
Payer: COMMERCIAL
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
## 2024-02-03 LAB
ANION GAP SERPL CALCULATED.3IONS-SCNC: 11 MMOL/L (ref 7–15)
BUN SERPL-MCNC: 23.2 MG/DL (ref 8–23)
CALCIUM SERPL-MCNC: 9.5 MG/DL (ref 8.8–10.2)
CHLORIDE SERPL-SCNC: 108 MMOL/L (ref 98–107)
CREAT SERPL-MCNC: 1.38 MG/DL (ref 0.67–1.17)
DEPRECATED HCO3 PLAS-SCNC: 22 MMOL/L (ref 22–29)
EGFRCR SERPLBLD CKD-EPI 2021: 52 ML/MIN/1.73M2
GLUCOSE SERPL-MCNC: 137 MG/DL (ref 70–99)
POTASSIUM SERPL-SCNC: 4 MMOL/L (ref 3.4–5.3)
SODIUM SERPL-SCNC: 141 MMOL/L (ref 135–145)

## 2024-02-03 PROCEDURE — G0378 HOSPITAL OBSERVATION PER HR: HCPCS

## 2024-02-03 PROCEDURE — 99232 SBSQ HOSP IP/OBS MODERATE 35: CPT | Performed by: INTERNAL MEDICINE

## 2024-02-03 PROCEDURE — 97116 GAIT TRAINING THERAPY: CPT | Mod: GP

## 2024-02-03 PROCEDURE — 36415 COLL VENOUS BLD VENIPUNCTURE: CPT | Performed by: STUDENT IN AN ORGANIZED HEALTH CARE EDUCATION/TRAINING PROGRAM

## 2024-02-03 PROCEDURE — 97535 SELF CARE MNGMENT TRAINING: CPT | Mod: GO

## 2024-02-03 PROCEDURE — 250N000013 HC RX MED GY IP 250 OP 250 PS 637: Performed by: STUDENT IN AN ORGANIZED HEALTH CARE EDUCATION/TRAINING PROGRAM

## 2024-02-03 PROCEDURE — 250N000013 HC RX MED GY IP 250 OP 250 PS 637

## 2024-02-03 PROCEDURE — 97530 THERAPEUTIC ACTIVITIES: CPT | Mod: GP

## 2024-02-03 PROCEDURE — 99232 SBSQ HOSP IP/OBS MODERATE 35: CPT | Mod: GC | Performed by: STUDENT IN AN ORGANIZED HEALTH CARE EDUCATION/TRAINING PROGRAM

## 2024-02-03 PROCEDURE — 80048 BASIC METABOLIC PNL TOTAL CA: CPT | Performed by: STUDENT IN AN ORGANIZED HEALTH CARE EDUCATION/TRAINING PROGRAM

## 2024-02-03 RX ORDER — LIDOCAINE 4 G/G
2 PATCH TOPICAL
Status: DISCONTINUED | OUTPATIENT
Start: 2024-02-03 | End: 2024-02-04 | Stop reason: HOSPADM

## 2024-02-03 RX ORDER — LIDOCAINE 4 G/G
2 PATCH TOPICAL
Status: DISCONTINUED | OUTPATIENT
Start: 2024-02-04 | End: 2024-02-03

## 2024-02-03 RX ADMIN — PANTOPRAZOLE SODIUM 40 MG: 40 TABLET, DELAYED RELEASE ORAL at 20:04

## 2024-02-03 RX ADMIN — Medication 1 CAPSULE: at 23:39

## 2024-02-03 RX ADMIN — Medication 1 CAPSULE: at 08:49

## 2024-02-03 RX ADMIN — ASPIRIN 81 MG: 81 TABLET, COATED ORAL at 08:49

## 2024-02-03 RX ADMIN — SIMVASTATIN 20 MG: 20 TABLET, FILM COATED ORAL at 20:04

## 2024-02-03 RX ADMIN — GABAPENTIN 900 MG: 300 CAPSULE ORAL at 20:04

## 2024-02-03 RX ADMIN — HYDROCHLOROTHIAZIDE 12.5 MG: 12.5 CAPSULE ORAL at 08:48

## 2024-02-03 RX ADMIN — VALSARTAN 160 MG: 80 TABLET, FILM COATED ORAL at 08:49

## 2024-02-03 RX ADMIN — ACETAMINOPHEN 650 MG: 325 TABLET ORAL at 12:26

## 2024-02-03 RX ADMIN — GABAPENTIN 900 MG: 300 CAPSULE ORAL at 08:48

## 2024-02-03 RX ADMIN — METFORMIN HYDROCHLORIDE 1000 MG: 500 TABLET, FILM COATED ORAL at 08:49

## 2024-02-03 RX ADMIN — METFORMIN HYDROCHLORIDE 1000 MG: 500 TABLET, FILM COATED ORAL at 18:17

## 2024-02-03 RX ADMIN — AMLODIPINE BESYLATE 2.5 MG: 2.5 TABLET ORAL at 08:48

## 2024-02-03 RX ADMIN — LIDOCAINE 2 PATCH: 4 PATCH TOPICAL at 18:17

## 2024-02-03 ASSESSMENT — ACTIVITIES OF DAILY LIVING (ADL)
ADLS_ACUITY_SCORE: 34
ADLS_ACUITY_SCORE: 34
ADLS_ACUITY_SCORE: 42
ADLS_ACUITY_SCORE: 34
ADLS_ACUITY_SCORE: 36
ADLS_ACUITY_SCORE: 42
ADLS_ACUITY_SCORE: 34
ADLS_ACUITY_SCORE: 42

## 2024-02-03 NOTE — PROGRESS NOTES
[facility]    Progress Note - Hospitalist Service       Date of Admission:  2/1/2024    Assessment & Plan   Pa García is a 78 year old male who has a history of HTN, T2DM, CKD3, lumbar fusion/decompression, lumbar laminectomy, patient reported history of complications from back surgery with right foot drop, neurogenic bladder/bowel, who presented after mechanical fall and generalized weakness. He was found to have new asymptomatic Atrial flutter.  Patient medically stable for discharged pending TCU placement.      Atrial flutter  Elevated troponin  Generalized weakness  Mechanical fall  Patient presenting for progressively worsening generalized weakness and mechanical fall.  No LOS, did not hit head.  No associated symptoms.  No chest pain or palpitations. Presented afebrile, vitally stable, satting well on RA.  He had mild elevation of troponin that down trended on repeat otherwise CBC, CMP Pro-Julio, lactic acid, UA, chest x-ray, UA, flu/RSV/COVID were unremarkable.  Blood cultures have been no growth to date. EKG showed a flutter and variable AV block.  A flutter appears to be new. Had episodes where he was bradycardic while in the ED but this seems to have improved. He remains to be asymptomatic.  Cardiology was consulted.  Patient had an echo that had normal LV function without wall motion abnormalities.  He would be a candidate for full anticoagulation but patient declined given history of recurrent falls.  -Continue PTA aspirin 81 mg  -PT/OT consulted  -Dispo: TCU     CKD stage III  Per chart review, new baseline Cr in 1.5-1.7 range. On presentation creatinine 1.76  -Continue to monitor      Normocytic anemia  9.6 on admission with normal MCV.  No signs of gross bleed, hemodynamically stable.  Likely related to CKD.   -Continue to monitor     T2DM  A1c 6.7 takes metformin 1000 twice daily  -Continued metformin twice daily    HTN  Patient reports taking morning  medications.  -Continue PTA amlodipine, hydrochlorothiazide, valsartan     HLD  Decreased PTA simvastatin from 40mg to 20 mg as amlodipine and simvastatin increases simvastatin exposure and increased risk of myopathy or rhabdo.     Chronic hip pain and low back pain  Trochanteric bursitis   -Continue PTA gabapentin 300 mg twice daily  -Patient agreeable with steroid injection, plan to do it today    Diet: Moderate Consistent Carb (60 g CHO per Meal) Diet    DVT Prophylaxis: Pneumatic Compression Devices  Gilbert Catheter: Not present  Fluids: PO  Lines: None     Cardiac Monitoring: ACTIVE order. Indication: Tachyarrhythmias, acute (48 hours)  Code Status: Full Code      Clinically Significant Risk Factors Present on Admission                # Drug Induced Platelet Defect: home medication list includes an antiplatelet medication   # Hypertension: Noted on problem list     # DMII: A1C = 6.7 % (Ref range: <5.7 %) within past 6 months        # Financial/Environmental Concerns: none         Disposition Plan      Expected Discharge Date: 02/06/2024        Discharge Comments: home at d/c with hc versus tcu placement.  Medication injection into hip 2/3?        The patient's care was discussed with the Attending Physician, Dr. Nayak .    Socorro Felix MD PGY2  Hospitalist Service  [facility]  Securely message with [employer] (more info)  Text page via [facility] Paging/Directory   ______________________________________________________________________    Interval History   NAEO.  Patient reported bilateral lower extremity weakness more pronounced today compared to yesterday. Lucila lift sling had to be used. That has since improved. Otherwise denies chest palpitations, chest pain or shortness of breath.  Continues to report bilateral hip pain that is unchanged.    Physical Exam   Vital Signs: Temp: 97.5  F (36.4  C) Temp src: Oral BP: (!) 153/70 Pulse: 104   Resp: 18 SpO2: 92 % O2 Device: None (Room air)     Weight: 146 lbs 2.64 oz    Constitutional: awake, alert, cooperative, no apparent distress, and appears stated age  ENT: Moist mucous membranes  Respiratory: No increased work of breathing, good air exchange, clear to auscultation bilaterally, no crackles or wheezing  Cardiovascular: Regular rate and rhythm, normal S1 and S2, no S3 or S4, and no murmur noted  GI: Normal bowel sounds, soft, non-distended, non-tender  Musculoskeletal: Tenderness on bilateral lateral hips  Neuro: AOx3, cranial nerves intact, strength 5 out of 5 bilateral upper and lower extremity, sensation intact, normal coordination.  Gait not assessed    Medical Decision Making       Please see A&P for additional details of medical decision making.      Data     I have personally reviewed the following data over the past 24 hrs:    N/A  \   N/A   / N/A     141 108 (H) 23.2 (H) /  137 (H)   4.0 22 1.38 (H) \       Imaging results reviewed over the past 24 hrs:   Recent Results (from the past 24 hour(s))   Echocardiogram Complete    Narrative    [id number]  [id number]  [id number]  [id number]^HIWOT^LAURO^[place] [address]     Name: JOSÉ ANTONIO ALFARO  MRN: [id number]  : 1945  Study Date: 2024 11:06 AM  Age: 78 yrs  Gender: Male  Patient Location: [facility]  Reason For Study: Atrial Flutter  Ordering Physician: LAURO MI  Performed By: VENICE     BSA: 1.8 m2  Height: 69 in  Weight: 150 lb  HR: 63  BP: 170/78 mmHg  ______________________________________________________________________________  Procedure  Complete Portable Echo Adult.  ______________________________________________________________________________  Interpretation Summary     1. Normal left ventricular size and systolic performance with a visually  estimated ejection fraction of 55%.  2. No significant valvular heart disease is identified on this study.  3. Normal right ventricular size and systolic  performance.  ______________________________________________________________________________  Left ventricle:  Normal left ventricular size and systolic performance with a visually  estimated ejection fraction of 55%. There is normal regional wall motion. Left  ventricular wall thickness is normal.     Assessment of LV Diastolic Function: Patient appears to be in atrial flutter  which limits assessment of diastolic filling.     Right ventricle:  Normal right ventricular size and systolic performance.     Left atrium:  The left atrium is of normal size.     Right atrium:  The right atrium is of normal size.     IVC:  The IVC is of normal caliber.     Aortic valve:  The aortic valve is comprised of three cusps. No significant aortic stenosis  or aortic insufficiency is detected on this study.     Mitral valve:  The mitral valve appears morphologically normal. There is trace mitral  insufficiency.     Tricuspid valve:  The tricuspid valve is grossly morphologically normal. There is trace  tricuspid insufficiency.     Pulmonic valve:  The pulmonic valve is grossly morphologically normal.     Thoracic aorta:  The aortic root and proximal ascending aorta are of normal dimension.     Pericardium:  There is no significant pericardial effusion.  ______________________________________________________________________________  ______________________________________________________________________________  MMode/2D Measurements & Calculations  IVSd: 0.93 cm  LVIDd: 4.7 cm  LVIDs: 3.0 cm  LVPWd: 1.2 cm  FS: 37.1 %  LV mass(C)d: 182.8 grams  LV mass(C)dI: 100.0 grams/m2  Ao root diam: 2.7 cm  LA dimension: 3.3 cm  LA/Ao: 1.2  Ao root diam index Ht(cm/m): 1.5  Ao root diam index BSA (cm/m2): 1.5     LA Volume Indexed (AL/bp): 16.8 ml/m2  RV Base: 3.6 cm  RWT: 0.53  TAPSE: 1.5 cm     Time Measurements  MM HR: 65.0 BPM     Doppler Measurements & Calculations  MV E max abena: 71.6 cm/sec  MV A max abena: 38.1 cm/sec  MV E/A: 1.9  MV dec  slope: 316.9 cm/sec2  MV dec time: 0.23 sec  LV V1 max PG: 3.9 mmHg  LV V1 max: 99.0 cm/sec  LV V1 VTI: 18.5 cm  PA acc time: 0.08 sec  E/E' av.1  Lateral E/e': 6.6  Medial E/e': 7.7     RV S Marv: 10.9 cm/sec     ______________________________________________________________________________  Report approved by: Negrita Padilla 2024 03:35 PM

## 2024-02-03 NOTE — PLAN OF CARE
PRIMARY DIAGNOSIS: GENERALIZED WEAKNESS    OUTPATIENT/OBSERVATION GOALS TO BE MET BEFORE DISCHARGE  1. Orthostatic performed: No    2. Tolerating PO medications: Yes    3. Return to near baseline physical activity: No    4. Cleared for discharge by consultants (if involved): No, cards    Discharge Planner Nurse   Safe discharge environment identified: No  Barriers to discharge: Yes, safe placement home.       Entered by: ROLAN BARTHOLOMEW RN 02/02/2024 10:49 PM         Goal Outcome Evaluation:    Problem: Adult Inpatient Plan of Care  Goal: Absence of Hospital-Acquired Illness or Injury  Intervention: Identify and Manage Fall Risk  Recent Flowsheet Documentation  Taken 2/2/2024 2015 by Rolan Bartholomew RN  Safety Promotion/Fall Prevention:   safety round/check completed   supervised activity   room near nurse's station   patient and family education   nonskid shoes/slippers when out of bed   mobility aid in reach   clutter free environment maintained    Problem: Adult Inpatient Plan of Care  Goal: Absence of Hospital-Acquired Illness or Injury  Intervention: Prevent Skin Injury  Recent Flowsheet Documentation  Taken 2/2/2024 2015 by Rolan Bartholomew RN  Body Position: (boosted) other (see comments)  Device Skin Pressure Protection: absorbent pad utilized/changed

## 2024-02-03 NOTE — PROGRESS NOTES
Care Management Follow Up    Length of Stay (days): 1    Expected Discharge Date: 02/06/2024     Concerns to be Addressed: discharge planning       Patient plan of care discussed at interdisciplinary rounds: Yes    Anticipated Discharge Disposition:  TCU     Anticipated Discharge Services: None    Anticipated Discharge DME: None    Patient/family educated on Medicare website which has current facility and service quality ratings:  yes    Education Provided on the Discharge Plan: Yes (AVS per bedside RN)    Patient/Family in Agreement with the Plan: yes    Referrals Placed by CM/SW:  TCU        Additional Information:  CM reviewed chart. CM met with patient and family (bedside). CM provided information about TCU in [place] and TCU list. Patient and family would like a referral sent to [facility]. 12:41 PM. CM following.     TCU referral pending  [facility] (SNF)     Rubi Will RN

## 2024-02-03 NOTE — PLAN OF CARE
PRIMARY DIAGNOSIS: GENERALIZED WEAKNESS    OUTPATIENT/OBSERVATION GOALS TO BE MET BEFORE DISCHARGE  1. Orthostatic performed: No    2. Tolerating PO medications: Yes    3. Return to near baseline physical activity: No    4. Cleared for discharge by consultants (if involved): No    Discharge Planner Nurse   Safe discharge environment identified: No  Barriers to discharge: Yes, safe discharge diapositiona nd cards clearance.         Entered by: ROLAN PARSONS RN 02/03/2024 1:10 AM     Please review provider order for any additional goals.   Nurse to notify provider when observation goals have been met and patient is ready for discharge.    Goal Outcome Evaluation:  Problem: Risk for Delirium  Goal: Improved Sleep  Outcome: Progressing     Problem: Adult Inpatient Plan of Care  Goal: Optimal Comfort and Wellbeing  Intervention: Monitor Pain and Promote Comfort

## 2024-02-03 NOTE — PROGRESS NOTES
[facility] HEART [facility]   [address], [place] [address]   [email or web address]   OFFICE: [phone]            Impression and Plan     1.  Atrial flutter.  Ventricular response reasonable without specific rate control therapy.  Normally, Pa would be candidate for full anticoagulation for CVA prophylaxis.  In his case, however, he would be at risk for potential bleeding issues/injury related to instability and falling.  In addition to the fall leading to this admission, he had another recent fall.  He had a major fall a few years ago when he had fallen down a flight of stairs send was unable to get up for approximately 19 hours.  Understandably, Pa is very reticent to initiate full anticoagulation and in accordance with patient wishes will defer.  He does take a daily aspirin.    Pa was noted to have some tendency toward bradycardia when in the Emergency Department yesterday.  Since arrival to the floor, no bradycardia alarms are noted on review of telemetry.  Heart rate currently in the 60s.     2.  Elevated troponin.  Patient with marginal elevation in troponin with negative delta change (85 and 70 ng/L).  Doubt this represents significant myocardial injury particularly in light of renal insufficiency which decreases the specificity.  In addition, although fairly specific to myocardium, it is possible it may be some musculoskeletal cross-reactivity given recent mechanical fall.  In addition, he denies any chest pain symptoms concerning for angina.  ECG with nonspecific T wave changes only.  Echocardiogram 2 February 2024 revealed normal left ventricular systolic performance without wall motion abnormality.  Given the aforementioned, do not feel immediate additional cardiac workup is required.     3.  Hypertension.  Blood pressure presently 153/70 mmHg mmHg.     4.  Mechanical fall.     Primary Cardiologist: Dr. Tracee Sierra (initial consultation this  "admission)    Katty Winn states his breathing is comfortable.  He denies any chest pain or other concerning cardiac symptoms.    Cardiac Diagnostics   Telemetry (personally reviewed): Atrial flutter with heart rate in the 60s.    Echocardiogram 2 February 2024 (personally reviewed):  Normal left ventricular size and systolic performance with a visually estimated ejection fraction of 55%.  No significant valvular heart disease is identified on this study.  Normal right ventricular size and systolic performance.    Twelve-lead ECG (personally reviewed) 2 February 2024: Atrial flutter with heart rate of 63 bpm.  Cannot rule out septal infarct.     Twelve-lead ECG (personally reviewed) 1 February 2024: Atrial fibrillation/flutter with heart rate of 95 bpm.  Incomplete right bundle branch block.  Cannot rule out septal infarct.     Twelve-lead ECG (personally reviewed) 8 June 2021: Sinus rhythm with first-degree AV block.  Heart rate 96 bpm.    Physical Examination       BP (!) 153/70 (BP Location: Left arm, Cuff Size: Adult Regular)   Pulse 104   Temp 97.5  F (36.4  C) (Oral)   Resp 18   Ht 1.753 m (5' 9\")   Wt 66.3 kg (146 lb 2.6 oz)   SpO2 92%   BMI 21.58 kg/m          Vitals:    02/02/24 0100 02/03/24 0345   Weight: 68 kg (150 lb) 66.3 kg (146 lb 2.6 oz)            Intake/Output Summary (Last 24 hours) at 2/3/2024 0733  Last data filed at 2/3/2024 0400  Gross per 24 hour   Intake 700 ml   Output 1300 ml   Net -600 ml       The patient is alert and oriented times three. Sclerae are anicteric. Mucosal membranes are moist. Jugular venous pressure is normal. No significant adenopathy/thyromegally appreciated. Lungs are fairly clear with reasonable expansion. On cardiovascular exam, the patient has an irregular S1 and S2. Abdomen is soft and non-tender. Extremities reveal no clubbing, cyanosis.         Imaging        Chest radiograph 1 February 2024:  Lungs are clear.   No pleural effusion or pneumothorax. " "  Borderline-enlarged cardiac silhouette, stable.   Normal pulmonary vascularity.   Degenerative and hypertrophic changes about the left proximal humerus may be posttraumatic.      Lab Results     Recent Labs   Lab 02/03/24  0419 02/02/24  0830 02/02/24  0540 02/01/24  2310   WBC  --   --  8.4 10.8   HGB  --   --  9.0* 9.6*   MCV  --   --  99 98   PLT  --   --  232 263     --  143 143   POTASSIUM 4.0  --  4.1 4.6   CHLORIDE 108*  --  112* 110*   CO2 22  --  23 22   BUN 23.2*  --  28.9* 31.1*   CR 1.38*  --  1.57* 1.76*   ANIONGAP 11  --  8 11   JAYRO 9.5  --  8.9 9.6   * 104* 96 107*   ALBUMIN  --   --   --  4.2   PROTTOTAL  --   --   --  6.8   BILITOTAL  --   --   --  0.4   ALKPHOS  --   --   --  70   ALT  --   --   --  10   AST  --   --   --  20     No results for input(s): \"NTBNPI\", \"BNP\" in the last 77280 hours.    Invalid input(s): \"NTPNP\"  No lab results found in last 7 days.    Invalid input(s): \"LDLCALC\"  Lab Results   Component Value Date     02/03/2024    CO2 22 02/03/2024    CO2 23 06/10/2021    BUN 23.2 02/03/2024    BUN 22 06/10/2021     Lab Results   Component Value Date    WBC 8.4 02/02/2024    HGB 9.0 02/02/2024    HCT 27.2 02/02/2024    MCV 99 02/02/2024     02/02/2024     No results found for: \"CHOL\", \"TRIG\", \"HDL\", \"LDLDIRECT\"  No results found for: \"INR\"  No results found for: \"CKTOTAL\", \"CKMB\", \"TROPONINI\"  Lab Results   Component Value Date    TSH 2.62 02/02/2024           Current Inpatient Scheduled Medications   Scheduled Meds:   amLODIPine  2.5 mg Oral Daily    aspirin  81 mg Oral Daily    gabapentin  900 mg Oral BID    hydrochlorothiazide  12.5 mg Oral Daily    metFORMIN  1,000 mg Oral BID w/meals    multivitamin  with lutein  1 capsule Oral BID    pantoprazole  40 mg Oral At Bedtime    polyethylene glycol  17 g Oral Daily    simvastatin  20 mg Oral At Bedtime    sodium chloride (PF)  3 mL Intracatheter Q8H    valsartan  160 mg Oral Daily     Continuous Infusions:   "       Medications Prior to Admission   Prior to Admission medications    Medication Sig Start Date End Date Taking? Authorizing Provider   acetaminophen (TYLENOL) 325 MG tablet [ACETAMINOPHEN (TYLENOL) 325 MG TABLET] Take 650 mg by mouth every 4 (four) hours as needed for pain. 3/7/16  Yes Provider, Historical   amLODIPine (NORVASC) 2.5 MG tablet Take 2.5 mg by mouth daily   Yes Unknown, Entered By History   aspirin 81 MG EC tablet [ASPIRIN 81 MG EC TABLET] Take 81 mg by mouth daily. 3/7/16  Yes Provider, Historical   gabapentin (NEURONTIN) 300 MG capsule [GABAPENTIN (NEURONTIN) 300 MG CAPSULE] Take 900 mg by mouth 2 (two) times a day.  3/8/16  Yes Provider, Historical   hydroCHLOROthiazide (HYDRODIURIL) 12.5 MG tablet [HYDROCHLOROTHIAZIDE (HYDRODIURIL) 12.5 MG TABLET] Take 12.5 mg by mouth daily. 6/3/21  Yes Provider, Historical   lansoprazole (PREVACID) 30 MG capsule Take 30 mg by mouth at bedtime 3/7/16  Yes Provider, Historical   metFORMIN (GLUCOPHAGE) 1000 MG tablet [METFORMIN (GLUCOPHAGE) 1000 MG TABLET] Take 1,000 mg by mouth 2 (two) times a day with meals. 4/2/21  Yes Provider, Historical   simvastatin (ZOCOR) 40 MG tablet [SIMVASTATIN (ZOCOR) 40 MG TABLET] Take 40 mg by mouth bedtime. 3/7/16  Yes Provider, Historical   UNABLE TO FIND Take 1 capsule by mouth 2 times daily MEDICATION NAME: Omega XL   Yes Unknown, Entered By History   UNABLE TO FIND [UNABLE TO FIND] Med Name: Super beta prostate - 1 capsule two times a day. 6/8/21  Yes Provider, Historical   UNABLE TO FIND [UNABLE TO FIND] Med Name: Prevagen - 1 capsule daily. 6/8/21  Yes Provider, Historical   valsartan (DIOVAN) 160 MG tablet [VALSARTAN (DIOVAN) 160 MG TABLET] Take 160 mg by mouth daily. 3/3/21  Yes Provider, Historical   vit C,Z-Uy-chwej-lutein-zeaxan (PRESERVISION AREDS-2) 250-90-40-1 mg cap [VIT C,L-LK-OAFYC-LUTEIN-ZEAXAN (PRESERVISION AREDS-2) 250-90-40-1 MG CAP] Take 1 capsule by mouth 2 (two) times a day. 6/8/21  Yes Provider,  Historical   white petrolatum (AQUAPHOR NATURAL HEALING) 41 % Oint [WHITE PETROLATUM (AQUAPHOR NATURAL HEALING) 41 % OINT] Apply to wounds daily as needed. 6/10/21  Yes Provider, Historical

## 2024-02-03 NOTE — PLAN OF CARE
PRIMARY DIAGNOSIS: GENERALIZED WEAKNESS    OUTPATIENT/OBSERVATION GOALS TO BE MET BEFORE DISCHARGE  1. Orthostatic performed: No    2. Tolerating PO medications: Yes    3. Return to near baseline physical activity: No    4. Cleared for discharge by consultants (if involved): No    Discharge Planner Nurse   Safe discharge environment identified: No  Barriers to discharge: Yes. Placement and cards consult.          Entered by: ROLAN BARTHOLOMEW RN 02/03/2024 3:58 AM     Please review provider order for any additional goals.   Nurse to notify provider when observation goals have been met and patient is ready for discharge.    Goal Outcome Evaluation:  Problem: Risk for Delirium  Goal: Improved Attention and Thought Clarity  2/3/2024 0413 by Rolan Bartholomew RN  Outcome: Progressing     Problem: Adult Inpatient Plan of Care  Goal: Readiness for Transition of Care  Outcome: Progressing     Problem: Fall Injury Risk  Goal: Absence of Fall and Fall-Related Injury  2/3/2024 0413 by Rolan Bartholomew RN  Outcome: Progressing

## 2024-02-04 ENCOUNTER — MEDICAL CORRESPONDENCE (OUTPATIENT)
Dept: HEALTH INFORMATION MANAGEMENT | Facility: CLINIC | Age: 79
End: 2024-02-04
Payer: COMMERCIAL

## 2024-02-04 VITALS
RESPIRATION RATE: 16 BRPM | OXYGEN SATURATION: 95 % | HEART RATE: 70 BPM | TEMPERATURE: 98.5 F | WEIGHT: 154.1 LBS | SYSTOLIC BLOOD PRESSURE: 120 MMHG | DIASTOLIC BLOOD PRESSURE: 58 MMHG | BODY MASS INDEX: 22.82 KG/M2 | HEIGHT: 69 IN

## 2024-02-04 PROBLEM — E78.5 HLD (HYPERLIPIDEMIA): Status: ACTIVE | Noted: 2024-02-04

## 2024-02-04 LAB — GLUCOSE BLDC GLUCOMTR-MCNC: 175 MG/DL (ref 70–99)

## 2024-02-04 PROCEDURE — 20605 DRAIN/INJ JOINT/BURSA W/O US: CPT | Mod: GC

## 2024-02-04 PROCEDURE — 99238 HOSP IP/OBS DSCHRG MGMT 30/<: CPT | Mod: 25

## 2024-02-04 PROCEDURE — 250N000009 HC RX 250: Performed by: STUDENT IN AN ORGANIZED HEALTH CARE EDUCATION/TRAINING PROGRAM

## 2024-02-04 PROCEDURE — 82962 GLUCOSE BLOOD TEST: CPT

## 2024-02-04 PROCEDURE — G0378 HOSPITAL OBSERVATION PER HR: HCPCS

## 2024-02-04 PROCEDURE — 250N000011 HC RX IP 250 OP 636

## 2024-02-04 PROCEDURE — 20610 DRAIN/INJ JOINT/BURSA W/O US: CPT | Mod: 50

## 2024-02-04 PROCEDURE — 250N000013 HC RX MED GY IP 250 OP 250 PS 637: Performed by: STUDENT IN AN ORGANIZED HEALTH CARE EDUCATION/TRAINING PROGRAM

## 2024-02-04 PROCEDURE — 250N000013 HC RX MED GY IP 250 OP 250 PS 637

## 2024-02-04 PROCEDURE — 99232 SBSQ HOSP IP/OBS MODERATE 35: CPT | Performed by: INTERNAL MEDICINE

## 2024-02-04 RX ORDER — POLYETHYLENE GLYCOL 3350 17 G/17G
17 POWDER, FOR SOLUTION ORAL DAILY PRN
Qty: 510 G | Refills: 0 | Status: SHIPPED | OUTPATIENT
Start: 2024-02-04

## 2024-02-04 RX ORDER — TRIAMCINOLONE ACETONIDE 40 MG/ML
80 INJECTION, SUSPENSION INTRA-ARTICULAR; INTRAMUSCULAR ONCE
Status: COMPLETED | OUTPATIENT
Start: 2024-02-04 | End: 2024-02-04

## 2024-02-04 RX ORDER — LIDOCAINE HYDROCHLORIDE 10 MG/ML
10 INJECTION, SOLUTION EPIDURAL; INFILTRATION; INTRACAUDAL; PERINEURAL ONCE
Status: COMPLETED | OUTPATIENT
Start: 2024-02-04 | End: 2024-02-04

## 2024-02-04 RX ORDER — SIMVASTATIN 20 MG
20 TABLET ORAL AT BEDTIME
Qty: 90 TABLET | Refills: 0 | Status: SHIPPED | OUTPATIENT
Start: 2024-02-04

## 2024-02-04 RX ADMIN — GABAPENTIN 900 MG: 300 CAPSULE ORAL at 09:11

## 2024-02-04 RX ADMIN — POLYETHYLENE GLYCOL 3350 17 G: 17 POWDER, FOR SOLUTION ORAL at 09:13

## 2024-02-04 RX ADMIN — VALSARTAN 160 MG: 80 TABLET, FILM COATED ORAL at 09:11

## 2024-02-04 RX ADMIN — AMLODIPINE BESYLATE 2.5 MG: 2.5 TABLET ORAL at 09:13

## 2024-02-04 RX ADMIN — LIDOCAINE HYDROCHLORIDE 10 ML: 10 INJECTION, SOLUTION EPIDURAL; INFILTRATION; INTRACAUDAL; PERINEURAL at 13:20

## 2024-02-04 RX ADMIN — Medication 1 CAPSULE: at 09:11

## 2024-02-04 RX ADMIN — TRIAMCINOLONE ACETONIDE 80 MG: 40 INJECTION, SUSPENSION INTRA-ARTICULAR; INTRAMUSCULAR at 13:24

## 2024-02-04 RX ADMIN — METFORMIN HYDROCHLORIDE 1000 MG: 500 TABLET, FILM COATED ORAL at 09:11

## 2024-02-04 RX ADMIN — HYDROCHLOROTHIAZIDE 12.5 MG: 12.5 CAPSULE ORAL at 09:10

## 2024-02-04 RX ADMIN — ASPIRIN 81 MG: 81 TABLET, COATED ORAL at 09:10

## 2024-02-04 ASSESSMENT — ACTIVITIES OF DAILY LIVING (ADL)
ADLS_ACUITY_SCORE: 34

## 2024-02-04 NOTE — PROGRESS NOTES
[facility]    Progress Note - Hospitalist Service       Date of Admission:  2/1/2024    Assessment & Plan   Pa García is a 78 year old male who has a history of HTN, T2DM, CKD3, lumbar fusion/decompression, lumbar laminectomy, patient reported history of complications from back surgery with right foot drop, neurogenic bladder/bowel, who presented after mechanical fall and generalized weakness. He was found to have new asymptomatic Atrial flutter.  Patient medically stable for discharged pending TCU placement.      Atrial flutter  Elevated troponin  Generalized weakness  Mechanical fall  Patient presenting for progressively worsening generalized weakness and mechanical fall.  No LOS, did not hit head.  No associated symptoms.  No chest pain or palpitations. Presented afebrile, vitally stable, satting well on RA.  He had mild elevation of troponin that down trended on repeat otherwise CBC, CMP Pro-Julio, lactic acid, UA, chest x-ray, UA, flu/RSV/COVID were unremarkable.  Blood cultures have been no growth to date. EKG showed a flutter and variable AV block.  A flutter appears to be new. Had episodes where he was bradycardic while in the ED but this seems to have improved. He remains to be asymptomatic.  Cardiology was consulted.  Patient had an echo that had normal LV function without wall motion abnormalities.  He would be a candidate for full anticoagulation but patient declined given history of recurrent falls.  -Continue PTA aspirin 81 mg  -PT/OT consulted  -Dispo: TCU     CKD stage III  Per chart review, new baseline Cr in 1.5-1.7 range. On presentation creatinine 1.76  -Continue to monitor      Normocytic anemia  9.6 on admission with normal MCV.  No signs of gross bleed, hemodynamically stable.  Likely related to CKD.   -Continue to monitor     T2DM  A1c 6.7 takes metformin 1000 twice daily  -Continued metformin twice daily    HTN  Patient reports taking morning  medications.  -Continue PTA amlodipine, hydrochlorothiazide, valsartan     HLD  Decreased PTA simvastatin from 40mg to 20 mg as amlodipine and simvastatin increases simvastatin exposure and increased risk of myopathy or rhabdo.     Chronic hip pain and low back pain  Trochanteric bursitis   -Continue PTA gabapentin 300 mg twice daily  -Patient agreeable with steroid injection, plan to do it today   -procedure note to follow    Diet: Moderate Consistent Carb (60 g CHO per Meal) Diet    DVT Prophylaxis: Pneumatic Compression Devices  Gilbert Catheter: Not present  Fluids: PO  Lines: None     Cardiac Monitoring: ACTIVE order. Indication: Tachyarrhythmias, acute (48 hours)  Code Status: Full Code      Clinically Significant Risk Factors Present on Admission                # Drug Induced Platelet Defect: home medication list includes an antiplatelet medication   # Hypertension: Noted on problem list     # DMII: A1C = 6.7 % (Ref range: <5.7 %) within past 6 months        # Financial/Environmental Concerns: none         Disposition Plan     Expected Discharge Date: 02/06/2024        Discharge Comments: home at d/c with hc versus tcu placement.  Medication injection into hip 2/3?        The patient's care was discussed with the Attending Physician, Dr. Nayak .    Eva Wood MD PGY3  Hospitalist Service  [facility]  Securely message with [employer] (more info)  Text page via [facility] Paging/Directory   ______________________________________________________________________    Interval History   NAEO.  Patient reported bilateral lower extremity weakness continues. Expresses that his children said he can stay in their small, one-level home if needed. Saddened by suddent change in independence. Let him know that we plan to stop by later for hip injections to help with discomfort. Otherwise denies chest palpitations, chest pain or shortness of breath.  Continues to report bilateral hip pain that is  unchanged.    Physical Exam   Vital Signs: Temp: 97.7  F (36.5  C) Temp src: Oral BP: (!) 140/63 Pulse: 70   Resp: 16 SpO2: 95 % O2 Device: None (Room air)    Weight: 154 lbs 1.62 oz    Constitutional: awake, alert, cooperative, no apparent distress, and appears stated age  ENT: Moist mucous membranes  Respiratory: No increased work of breathing, good air exchange, clear to auscultation bilaterally, no crackles or wheezing  Cardiovascular: Regular rate and rhythm, normal S1 and S2, no S3 or S4, and no murmur noted  Musculoskeletal: Tenderness on bilateral lateral hips  Neuro: AOx3, cranial nerves intact, strength 5 out of 5 bilateral upper and lower extremity, sensation intact, normal coordination.  Gait not assessed    Medical Decision Making       Please see A&P for additional details of medical decision making.      Data         Imaging results reviewed over the past 24 hrs:   No results found for this or any previous visit (from the past 24 hour(s)).

## 2024-02-04 NOTE — PLAN OF CARE
Goal Outcome Evaluation:      Problem: Adult Inpatient Plan of Care  Goal: Optimal Comfort and Wellbeing  Outcome: Progressing     Problem: Fall Injury Risk  Goal: Absence of Fall and Fall-Related Injury  Outcome: Progressing  Patient is up with an assist of 2 and a walker and gait belt. Tolerating activity fairly well at this time. Worked with therapy today. Patient complained of chronic hip pain. Prn tylenol given and lidocaine patches applied. Family supportive. Plan is for patient to discharge to TCU when able. Alarms utilized for patient safety. Call light placed within reach and purposeful rounding performed. Zohra Hodges RN

## 2024-02-04 NOTE — PLAN OF CARE
Patient discharging to TCU via wheelchair transport. Paperwork given to . Report called to nurse at [facility]. All belongings gathered and taken with patient.

## 2024-02-04 NOTE — PROGRESS NOTES
[facility]   [address], [place] [address]   [email or web address]   OFFICE: [phone]            Impression and Plan     1.  Atrial flutter.  Ventricular response reasonable without specific rate control therapy.  Normally, Pa would be candidate for full anticoagulation for CVA prophylaxis.  In his case, however, he would be at risk for potential bleeding issues/injury related to instability and falling.  In addition to the fall leading to this admission, he had another recent fall.  He had a major fall a few years ago when he had fallen down a flight of stairs send was unable to get up for approximately 19 hours.  Understandably, Pa is very reticent to initiate full anticoagulation and in accordance with patient wishes will defer.  He does take a daily aspirin.    Pa was noted to have some tendency toward bradycardia when initially in the Emergency Department.  Since arrival to the floor, he has been noted to have some nocturnal heart rates in the 40s, but no significant pauses.  Heart rate would appear to be fairly reasonable during awake hours.  Do not feel permanent pacemaker placement is required.    2.  Elevated troponin.  Patient with marginal elevation in troponin with negative delta change (85 and 70 ng/L).  Doubt this represents significant myocardial injury particularly in light of renal insufficiency which decreases the specificity.  In addition, although fairly specific to myocardium, it is possible it may be some musculoskeletal cross-reactivity given recent mechanical fall.  In addition, he denies any chest pain symptoms concerning for angina.  ECG with nonspecific T wave changes only.  Echocardiogram 2 February 2024 revealed normal left ventricular systolic performance without wall motion abnormality.  Given the aforementioned, do not feel immediate additional cardiac workup is required.     3.  Hypertension.  Blood pressure presently  "140/63 mmHg mmHg.     4.  Mechanical fall.    At this time, do not feel additional cardiac workup is necessarily required.  Will sign off, however, please do not hesitate to call me directly with any additional questions.  Thanks.     Primary Cardiologist: Dr. Tracee Sierra (initial consultation this admission)    Katty Winn states his breathing is comfortable.  He denies any chest pain or other concerning cardiac symptoms.    Cardiac Diagnostics   Telemetry (personally reviewed): Atrial flutter with heart rate in the 60s.    Echocardiogram 2 February 2024 (personally reviewed):  Normal left ventricular size and systolic performance with a visually estimated ejection fraction of 55%.  No significant valvular heart disease is identified on this study.  Normal right ventricular size and systolic performance.    Twelve-lead ECG (personally reviewed) 2 February 2024: Atrial flutter with heart rate of 63 bpm.  Cannot rule out septal infarct.     Twelve-lead ECG (personally reviewed) 1 February 2024: Atrial fibrillation/flutter with heart rate of 95 bpm.  Incomplete right bundle branch block.  Cannot rule out septal infarct.     Twelve-lead ECG (personally reviewed) 8 June 2021: Sinus rhythm with first-degree AV block.  Heart rate 96 bpm.    Physical Examination       BP (!) 140/63 (BP Location: Left arm)   Pulse 70   Temp 97.7  F (36.5  C) (Oral)   Resp 16   Ht 1.753 m (5' 9\")   Wt 69.9 kg (154 lb 1.6 oz)   SpO2 95%   BMI 22.76 kg/m          Vitals:    02/02/24 0100 02/03/24 0345 02/04/24 0524   Weight: 68 kg (150 lb) 66.3 kg (146 lb 2.6 oz) 69.9 kg (154 lb 1.6 oz)            Intake/Output Summary (Last 24 hours) at 2/3/2024 0733  Last data filed at 2/3/2024 0400  Gross per 24 hour   Intake 700 ml   Output 1300 ml   Net -600 ml       The patient is alert and oriented times three. Sclerae are anicteric. Mucosal membranes are moist. Jugular venous pressure is normal. No significant " "adenopathy/thyromegally appreciated. Lungs are fairly clear with reasonable expansion. On cardiovascular exam, the patient has an irregular S1 and S2. Abdomen is soft and non-tender. Extremities reveal no clubbing, cyanosis.         Imaging        Chest radiograph 1 February 2024:  Lungs are clear.   No pleural effusion or pneumothorax.   Borderline-enlarged cardiac silhouette, stable.   Normal pulmonary vascularity.   Degenerative and hypertrophic changes about the left proximal humerus may be posttraumatic.      Lab Results     Recent Labs   Lab 02/03/24  0419 02/02/24  0830 02/02/24  0540 02/01/24  2310   WBC  --   --  8.4 10.8   HGB  --   --  9.0* 9.6*   MCV  --   --  99 98   PLT  --   --  232 263     --  143 143   POTASSIUM 4.0  --  4.1 4.6   CHLORIDE 108*  --  112* 110*   CO2 22  --  23 22   BUN 23.2*  --  28.9* 31.1*   CR 1.38*  --  1.57* 1.76*   ANIONGAP 11  --  8 11   JAYRO 9.5  --  8.9 9.6   * 104* 96 107*   ALBUMIN  --   --   --  4.2   PROTTOTAL  --   --   --  6.8   BILITOTAL  --   --   --  0.4   ALKPHOS  --   --   --  70   ALT  --   --   --  10   AST  --   --   --  20     No results for input(s): \"NTBNPI\", \"BNP\" in the last 88763 hours.    Invalid input(s): \"NTPNP\"  No lab results found in last 7 days.    Invalid input(s): \"LDLCALC\"  Lab Results   Component Value Date     02/03/2024    CO2 22 02/03/2024    CO2 23 06/10/2021    BUN 23.2 02/03/2024    BUN 22 06/10/2021     Lab Results   Component Value Date    WBC 8.4 02/02/2024    HGB 9.0 02/02/2024    HCT 27.2 02/02/2024    MCV 99 02/02/2024     02/02/2024     No results found for: \"CHOL\", \"TRIG\", \"HDL\", \"LDLDIRECT\"  No results found for: \"INR\"  No results found for: \"CKTOTAL\", \"CKMB\", \"TROPONINI\"  Lab Results   Component Value Date    TSH 2.62 02/02/2024           Current Inpatient Scheduled Medications   Scheduled Meds:   amLODIPine  2.5 mg Oral Daily    aspirin  81 mg Oral Daily    gabapentin  900 mg Oral BID    " hydrochlorothiazide  12.5 mg Oral Daily    lidocaine  2 patch Transdermal Q24H    metFORMIN  1,000 mg Oral BID w/meals    multivitamin  with lutein  1 capsule Oral BID    pantoprazole  40 mg Oral At Bedtime    polyethylene glycol  17 g Oral Daily    simvastatin  20 mg Oral At Bedtime    sodium chloride (PF)  3 mL Intracatheter Q8H    valsartan  160 mg Oral Daily     Continuous Infusions:         Medications Prior to Admission   Prior to Admission medications    Medication Sig Start Date End Date Taking? Authorizing Provider   acetaminophen (TYLENOL) 325 MG tablet [ACETAMINOPHEN (TYLENOL) 325 MG TABLET] Take 650 mg by mouth every 4 (four) hours as needed for pain. 3/7/16  Yes Provider, Historical   amLODIPine (NORVASC) 2.5 MG tablet Take 2.5 mg by mouth daily   Yes Unknown, Entered By History   aspirin 81 MG EC tablet [ASPIRIN 81 MG EC TABLET] Take 81 mg by mouth daily. 3/7/16  Yes Provider, Historical   gabapentin (NEURONTIN) 300 MG capsule [GABAPENTIN (NEURONTIN) 300 MG CAPSULE] Take 900 mg by mouth 2 (two) times a day.  3/8/16  Yes Provider, Historical   hydroCHLOROthiazide (HYDRODIURIL) 12.5 MG tablet [HYDROCHLOROTHIAZIDE (HYDRODIURIL) 12.5 MG TABLET] Take 12.5 mg by mouth daily. 6/3/21  Yes Provider, Historical   lansoprazole (PREVACID) 30 MG capsule Take 30 mg by mouth at bedtime 3/7/16  Yes Provider, Historical   metFORMIN (GLUCOPHAGE) 1000 MG tablet [METFORMIN (GLUCOPHAGE) 1000 MG TABLET] Take 1,000 mg by mouth 2 (two) times a day with meals. 4/2/21  Yes Provider, Historical   simvastatin (ZOCOR) 40 MG tablet [SIMVASTATIN (ZOCOR) 40 MG TABLET] Take 40 mg by mouth bedtime. 3/7/16  Yes Provider, Historical   UNABLE TO FIND Take 1 capsule by mouth 2 times daily MEDICATION NAME: Omega XL   Yes Unknown, Entered By History   UNABLE TO FIND [UNABLE TO FIND] Med Name: Super beta prostate - 1 capsule two times a day. 6/8/21  Yes Provider, Historical   UNABLE TO FIND [UNABLE TO FIND] Med Name: Prevagen - 1 capsule  daily. 6/8/21  Yes Provider, Historical   valsartan (DIOVAN) 160 MG tablet [VALSARTAN (DIOVAN) 160 MG TABLET] Take 160 mg by mouth daily. 3/3/21  Yes Provider, Historical   vit C,R-Qi-jzwce-lutein-zeaxan (PRESERVISION AREDS-2) 250-90-40-1 mg cap [VIT C,F-NR-SZLTW-LUTEIN-ZEAXAN (PRESERVISION AREDS-2) 250-90-40-1 MG CAP] Take 1 capsule by mouth 2 (two) times a day. 6/8/21  Yes Provider, Historical   white petrolatum (AQUAPHOR NATURAL HEALING) 41 % Oint [WHITE PETROLATUM (AQUAPHOR NATURAL HEALING) 41 % OINT] Apply to wounds daily as needed. 6/10/21  Yes Provider, Historical

## 2024-02-04 NOTE — PROCEDURES
Problem: Adult Inpatient Plan of Care  Goal: Plan of Care Review  Outcome: Ongoing, Progressing  Goal: Patient-Specific Goal (Individualized)  Outcome: Ongoing, Progressing  Goal: Absence of Hospital-Acquired Illness or Injury  Outcome: Ongoing, Progressing  Goal: Optimal Comfort and Wellbeing  Outcome: Ongoing, Progressing  Goal: Readiness for Transition of Care  Outcome: Ongoing, Progressing     Problem: Fluid Imbalance (Pneumonia)  Goal: Fluid Balance  Outcome: Ongoing, Progressing     Problem: Infection (Pneumonia)  Goal: Resolution of Infection Signs and Symptoms  Outcome: Ongoing, Progressing   Will continue to monitor   Procedure Note    Discussed risks and benefits of procedure with patient. Risks discussed include but not limited to infection, bleeding, and nerve damage. Patient agreed to bilateral injection of both trochanteric bursa for relief of hip pain.     Area of maximum tenderness was established and marked with indentation from a pen, cleaned with alcohol and betadine. Area was injected with 1ml (40mg) kenolog and 4ml lidocaine without epinephrine on each side.     Both sides were injected, procedure well-tolerated by patient.     Eva Wood MD on 2/4/2024 at 4:24 PM

## 2024-02-04 NOTE — PROGRESS NOTES
Discussed with MD. Cardiology signed off. Likely medically ready for discharge later today.     [facility] TCU  William Moura Nurse request full SNF referral be faxed to [phone]; CM faxed per request. She states she can review and return call to . She states bed availability for today still TBD pending on if other patient's accepted are medically ready or not.   11:54 AM  William Moura RN confirms facility can accept clinically. Will update CM regarding if able to come later today vs tomorrow, Monday at 11am     [facility] TCU  William Lindsey RN states no bed availability until Monday 2/5.    Care Management Discharge Note    Discharge Date: 02/04/2024       Discharge Disposition: Transitional Care ([facility])    Discharge Services:  ([facility])    Discharge DME: None    Discharge Transportation: agency ([employer])    Private pay costs discussed:  CM reviewed anticipated out of pocket expense for  [employer]  transportation. Patient, hakeem Munguia and daughter in law Carty confirm understanding and agreeable. They request this transport to be arranged.     Does the patient's insurance plan have a 3 day qualifying hospital stay waiver?  Yes     Which insurance plan 3 day waiver is available? Alternative insurance waiver    Will the waiver be used for post-acute placement? Yes    PAS Confirmation Code: [id number]  Patient/family educated on Medicare website which has current facility and service quality ratings: yes    Education Provided on the Discharge Plan: Yes  Persons Notified of Discharge Plans: patient, hakeem Munguia, daughter in law Carty   Patient/Family in Agreement with the Plan: yes    Handoff Referral Completed:  CM coordinated with patient and family. CM coordinated with William MAHONEY RN, [facility]. CM coordinated with TCU admissions. CM arranged WC transport. CM completed PAS. [facility] will fax discharge orders to TCU once  electronically signed.     Additional Information:  CM updated patient and family (son Ezra and daughter Machelle at bedside). They would like to confirm the bed at [facility].     Son and daughter in law will be taking patients dog home with them until patient is able to return home.   Son and daughter in law [place]     Anticipate [facility] WC transport. CM educated re: anticipated out of pocket expense for WC transport, that this expense is not typically covered by insurance plans. Patient and family confirm understanding and agreeable.     12:04 PM  Zeny Charge RN at [facility] TCU confirms able to accept patient today. But they would need orders by 3pm. CM updated MD (BMF) who confirm OK to plan for discharge this afternoon.     12:14 PM  CM arranged for [facility] WC transport for today, 2/3 at 3:30pm (window  time of [id number]).  CM updated Charge RN.     CM completed PAS= [id number]    Bedside RN to call Nurse to Nurse report to [phone] CM updated bedside RN.     Pippa Fraga, RN

## 2024-02-04 NOTE — PLAN OF CARE
PRIMARY DIAGNOSIS: ACUTE PAIN  OUTPATIENT/OBSERVATION GOALS TO BE MET BEFORE DISCHARGE:  1. Pain Status: Pain free.    2. Return to near baseline physical activity: Yes    3. Cleared for discharge by consultants (if involved): Yes    Discharge Planner Nurse   Safe discharge environment identified: discharge pending TCU placement   Barriers to discharge: No       Entered by: Sandy Serna RN 02/04/2024 1:54 AM     Please review provider order for any additional goals.   Nurse to notify provider when observation goals have been met and patient is ready for discharge.  Problem: Adult Inpatient Plan of Care  Goal: Optimal Comfort and Wellbeing  Outcome: Progressing   Goal Outcome Evaluation:    Pt is alert and oriented x4. VSS on RA. Pt denied any HA, SOB, chest pain, and N/V. Pt has pre-existing stage 1 community inquired wound sacrum area. Mepilex was applied. Pt stated that he has a dermatologist follow up with wound. Pt has blanchable areas to both heels, groin BL folds, scrotum, and side of right foot. Bony prominences were padded and heels were floated.     Tele readings are A-flutter and A-flutter bradycardiac.

## 2024-02-04 NOTE — DISCHARGE SUMMARY
[facility]  Discharge Summary - Medicine & Pediatrics       Date of Admission:  2/1/2024  Date of Discharge:  2/4/2024  Discharging Provider: Dr. Eva Wood and Dr. Eva Nayak  Discharge Service: Hospitalist Service    Discharge Diagnoses   Principal Problem:    Mechanical fall (2/3/2024)  Active Problems:    Right foot drop (3/8/2016)    Dehydration (2/2/2024)    Generalized weakness (2/2/2024)    Elevated troponin (2/2/2024)    Atypical atrial flutter (H) (2/2/2024)    HLD (hyperlipidemia) (2/4/2024)        Clinically Significant Risk Factors     # DMII: A1C = 6.7 % (Ref range: <5.7 %) within past 6 months       Follow-ups Needed After Discharge   Follow-up Appointments     Follow Up and recommended labs and tests      Follow up with primary care provider in 2 weeks.  No follow up labs or   test are needed.            Unresulted Labs Ordered in the Past 30 Days of this Admission       Date and Time Order Name Status Description    2/1/2024 11:06 PM Blood Culture Peripheral Blood Preliminary     2/1/2024 11:06 PM Blood Culture Peripheral Blood Preliminary         These results will be followed up by PCP.     Discharge Disposition   Discharged to rehabilitation facility  Condition at discharge: Stable    Hospital Course   Pa García was admitted on 2/1/2024 for mechanical fall and generalized weakness.  The following problems were addressed during his hospitalization:    Generalized weakness  Mechanical fall  Patient presenting for progressively worsening generalized weakness and mechanical fall.  No LOS, did not hit head.  No associated symptoms.  No chest pain or palpitations. Presented afebrile, vitally stable, satting well on RA.  He had mild elevation of troponin that down trended on repeat otherwise CBC, CMP Pro-Julio, lactic acid, UA, chest x-ray, UA, flu/RSV/COVID were unremarkable.  Blood cultures have been no growth to date. Discharged to TCU to help with ADL function and  building up strength.      Atrial flutter  Elevated troponin   EKG showed a flutter and variable AV block.  A flutter appears to be new. Had episodes where he was bradycardic while in the ED but this seems to have improved. He remains to be asymptomatic.  Cardiology was consulted.  Patient had an echo that had normal LV function without wall motion abnormalities.  He would be a candidate for full anticoagulation but patient declined given history of recurrent falls. Plan to follow up with outpatient cardiology for possible electrophysiology consult.     CKD stage III  Per chart review, new baseline Cr in 1.5-1.7 range. On presentation creatinine 1.76. Monitor outpatient.      Normocytic anemia  9.6 on admission with normal MCV.  No signs of gross bleed, hemodynamically stable.  Likely related to CKD.      T2DM  A1c 6.7 takes metformin 1000 twice daily.     HTN  Continued PTA amlodipine, hydrochlorothiazide, valsartan.      HLD  Decreased PTA simvastatin from 40mg to 20 mg as amlodipine and simvastatin increases simvastatin exposure and increased risk of myopathy or rhabdomyolysis. If this becomes an issue, can consider switching to atorvastatin as it does not have this interaction with amlodipine.      Chronic hip pain and low back pain  Trochanteric bursitis   Patient reported bilateral chronic pain in hips on trochanter. Reported some relief in the past with injections. Performed inpatient with 1ml (40mg) kenalog and 4ml lidocaine without epinephrine bilaterally 2/4. Continued PTA gabapentin 300 mg twice daily.        Consultations This Hospital Stay   PHYSICAL THERAPY ADULT IP CONSULT  OCCUPATIONAL THERAPY ADULT IP CONSULT  CARDIOLOGY IP CONSULT  CARE MANAGEMENT / SOCIAL WORK IP CONSULT  PHYSICAL THERAPY ADULT IP CONSULT  OCCUPATIONAL THERAPY ADULT IP CONSULT    Code Status   Full Code       The patient was discussed with Dr. Eva Wood MD  [facility] Teaching  [facility] [address] [address]  Phone: [phone]  Fax: [phone]  ______________________________________________________________________    Physical Exam   Vital Signs: Temp: 98.5  F (36.9  C) Temp src: Oral BP: 120/58 Pulse: 70   Resp: 16 SpO2: 95 % O2 Device: None (Room air)    Weight: 154 lbs 1.62 oz    Constitutional: awake, alert, cooperative, no apparent distress, and appears stated age  ENT: Moist mucous membranes  Respiratory: No increased work of breathing, good air exchange, clear to auscultation bilaterally, no crackles or wheezing  Cardiovascular: Regular rate and rhythm, normal S1 and S2, no S3 or S4, and no murmur noted  Musculoskeletal: Tenderness on bilateral lateral hips  Neuro: AOx3, cranial nerves intact, strength 5 out of 5 bilateral upper and lower extremity, sensation intact, normal coordination.  Gait not assessed         Primary Care Physician   Lars Rajan    Discharge Orders      General info for SNF    Length of Stay Estimate: Short Term Care: Estimated # of Days <30  Condition at Discharge: Improving  Level of care:skilled   Rehabilitation Potential: Good  Admission H&P remains valid and up-to-date: Yes  Recent Chemotherapy: N/A  Use Nursing Home Standing Orders: Yes     Mantoux instructions    Give two-step Mantoux (PPD) Per Facility Policy Yes     Follow Up and recommended labs and tests    Follow up with primary care provider in 2 weeks.  No follow up labs or test are needed.     Reason for your hospital stay    Mechanical fall and weakness. Plan for increasing strength at rehab for a short period.     Wound care (specify)    Site:   Sacrum  Instructions:  Stage 1 pressure ulcer noted, Meplex once daily     Activity - Up with assistive device     Activity - Up with nursing assistance     Full Code     Physical Therapy Adult Consult    Evaluate and treat as clinically indicated.    Reason:  Foot drop,  deconditioning, ADL assistance     Occupational Therapy Adult Consult    Evaluate and treat as clinically indicated.    Reason:  Foot drop, deconditioning, ADL assistance     Fall precautions     Diet    Follow this diet upon discharge: Orders Placed This Encounter      Moderate Consistent Carb (60 g CHO per Meal) Diet       Significant Results and Procedures   Most Recent 3 CBC's:  Recent Labs   Lab Test 02/02/24  0540 02/01/24  2310   WBC 8.4 10.8   HGB 9.0* 9.6*   MCV 99 98    263     Most Recent 3 BMP's:  Recent Labs   Lab Test 02/04/24  1408 02/03/24  0419 02/02/24  0830 02/02/24  0540 02/01/24  2310   NA  --  141  --  143 143   POTASSIUM  --  4.0  --  4.1 4.6   CHLORIDE  --  108*  --  112* 110*   CO2  --  22  --  23 22   BUN  --  23.2*  --  28.9* 31.1*   CR  --  1.38*  --  1.57* 1.76*   ANIONGAP  --  11  --  8 11   JAYRO  --  9.5  --  8.9 9.6   * 137* 104* 96 107*     Most Recent 2 LFT's:  Recent Labs   Lab Test 02/01/24  2310   AST 20   ALT 10   ALKPHOS 70   BILITOTAL 0.4     Most Recent Urinalysis:  Recent Labs   Lab Test 02/02/24  0141   COLOR Light Yellow   APPEARANCE Clear   URINEGLC Negative   URINEBILI Negative   URINEKETONE Negative   SG 1.011   UBLD Negative   URINEPH 5.0   PROTEIN 10*   NITRITE Negative   LEUKEST Negative   RBCU 1   WBCU 1       Results for orders placed or performed during the hospital encounter of 02/01/24   XR Chest Port 1 View    Narrative    EXAM: XR CHEST PORT 1 VIEW  LOCATION: [facility]  DATE: 2/1/2024    INDICATION: weakness  COMPARISON: 06/08/2021      Impression    IMPRESSION: Lungs are clear. No pleural effusion or pneumothorax. Borderline-enlarged cardiac silhouette, stable. Normal pulmonary vascularity. Degenerative and hypertrophic changes about the left proximal humerus may be posttraumatic.   Echocardiogram Complete    Narrative    [id number]  [id number]  [id number]  [id number]^[id number]^E     [facility]  [address] [place] [address]     Name: JOSÉ ANTONIO ALFARO  MRN: [id number]  : 1945  Study Date: 2024 11:06 AM  Age: 78 yrs  Gender: Male  Patient Location: [facility]  Reason For Study: Atrial Flutter  Ordering Physician: LAURO MI  Performed By: VENICE     BSA: 1.8 m2  Height: 69 in  Weight: 150 lb  HR: 63  BP: 170/78 mmHg  ______________________________________________________________________________  Procedure  Complete Portable Echo Adult.  ______________________________________________________________________________  Interpretation Summary     1. Normal left ventricular size and systolic performance with a visually  estimated ejection fraction of 55%.  2. No significant valvular heart disease is identified on this study.  3. Normal right ventricular size and systolic performance.  ______________________________________________________________________________  Left ventricle:  Normal left ventricular size and systolic performance with a visually  estimated ejection fraction of 55%. There is normal regional wall motion. Left  ventricular wall thickness is normal.     Assessment of LV Diastolic Function: Patient appears to be in atrial flutter  which limits assessment of diastolic filling.     Right ventricle:  Normal right ventricular size and systolic performance.     Left atrium:  The left atrium is of normal size.     Right atrium:  The right atrium is of normal size.     IVC:  The IVC is of normal caliber.     Aortic valve:  The aortic valve is comprised of three cusps. No significant aortic stenosis  or aortic insufficiency is detected on this study.     Mitral valve:  The mitral valve appears morphologically normal. There is trace mitral  insufficiency.     Tricuspid valve:  The tricuspid valve is grossly morphologically normal. There is trace  tricuspid insufficiency.     Pulmonic valve:  The pulmonic valve is grossly morphologically normal.     Thoracic aorta:  The aortic root and proximal  ascending aorta are of normal dimension.     Pericardium:  There is no significant pericardial effusion.  ______________________________________________________________________________  ______________________________________________________________________________  MMode/2D Measurements & Calculations  IVSd: 0.93 cm  LVIDd: 4.7 cm  LVIDs: 3.0 cm  LVPWd: 1.2 cm  FS: 37.1 %  LV mass(C)d: 182.8 grams  LV mass(C)dI: 100.0 grams/m2  Ao root diam: 2.7 cm  LA dimension: 3.3 cm  LA/Ao: 1.2  Ao root diam index Ht(cm/m): 1.5  Ao root diam index BSA (cm/m2): 1.5     LA Volume Indexed (AL/bp): 16.8 ml/m2  RV Base: 3.6 cm  RWT: 0.53  TAPSE: 1.5 cm     Time Measurements  MM HR: 65.0 BPM     Doppler Measurements & Calculations  MV E max marv: 71.6 cm/sec  MV A max marv: 38.1 cm/sec  MV E/A: 1.9  MV dec slope: 316.9 cm/sec2  MV dec time: 0.23 sec  LV V1 max PG: 3.9 mmHg  LV V1 max: 99.0 cm/sec  LV V1 VTI: 18.5 cm  PA acc time: 0.08 sec  E/E' av.1  Lateral E/e': 6.6  Medial E/e': 7.7     RV S Marv: 10.9 cm/sec     ______________________________________________________________________________  Report approved by: Negrita Padilla 2024 03:35 PM           Procedures:  Bilateral hip trochanter bursa injections   -4ml lidocaine without epinephrine and 1ml (40mg) triamcinolone injected on either side    Discharge Medications   Current Discharge Medication List        START taking these medications    Details   polyethylene glycol (MIRALAX) 17 GM/Dose powder Take 17 g by mouth daily as needed for constipation  Qty: 510 g, Refills: 0    Associated Diagnoses: Other constipation           CONTINUE these medications which have CHANGED    Details   simvastatin (ZOCOR) 20 MG tablet Take 1 tablet (20 mg) by mouth at bedtime  Qty: 90 tablet, Refills: 0    Associated Diagnoses: Hyperlipidemia, unspecified hyperlipidemia type           CONTINUE these medications which have NOT CHANGED    Details   acetaminophen (TYLENOL) 325 MG tablet  [ACETAMINOPHEN (TYLENOL) 325 MG TABLET] Take 650 mg by mouth every 4 (four) hours as needed for pain.      amLODIPine (NORVASC) 2.5 MG tablet Take 2.5 mg by mouth daily      aspirin 81 MG EC tablet [ASPIRIN 81 MG EC TABLET] Take 81 mg by mouth daily.      gabapentin (NEURONTIN) 300 MG capsule [GABAPENTIN (NEURONTIN) 300 MG CAPSULE] Take 900 mg by mouth 2 (two) times a day.       hydroCHLOROthiazide (HYDRODIURIL) 12.5 MG tablet [HYDROCHLOROTHIAZIDE (HYDRODIURIL) 12.5 MG TABLET] Take 12.5 mg by mouth daily.      lansoprazole (PREVACID) 30 MG capsule Take 30 mg by mouth at bedtime      metFORMIN (GLUCOPHAGE) 1000 MG tablet [METFORMIN (GLUCOPHAGE) 1000 MG TABLET] Take 1,000 mg by mouth 2 (two) times a day with meals.      !! UNABLE TO FIND Take 1 capsule by mouth 2 times daily MEDICATION NAME: Omega XL      !! UNABLE TO FIND [UNABLE TO FIND] Med Name: Super beta prostate - 1 capsule two times a day.      !! UNABLE TO FIND [UNABLE TO FIND] Med Name: Prevagen - 1 capsule daily.      valsartan (DIOVAN) 160 MG tablet [VALSARTAN (DIOVAN) 160 MG TABLET] Take 160 mg by mouth daily.      vit C,R-Fg-mpqkm-lutein-zeaxan (PRESERVISION AREDS-2) 250-90-40-1 mg cap [VIT C,C-HH-NKNPM-LUTEIN-ZEAXAN (PRESERVISION AREDS-2) 250-90-40-1 MG CAP] Take 1 capsule by mouth 2 (two) times a day.      white petrolatum (AQUAPHOR NATURAL HEALING) 41 % Oint [WHITE PETROLATUM (AQUAPHOR NATURAL HEALING) 41 % OINT] Apply to wounds daily as needed.  Qty: 1 Tube, Refills: 0    Associated Diagnoses: Skin tear of left elbow without complication, initial encounter; Skin tear of left lower leg without complication, initial encounter; Skin tear of right lower leg without complication, initial encounter; Abrasion of chin, initial encounter       !! - Potential duplicate medications found. Please discuss with provider.        Allergies   No Known Allergies

## 2024-02-05 ENCOUNTER — DOCUMENTATION ONLY (OUTPATIENT)
Dept: GERIATRICS | Facility: CLINIC | Age: 79
End: 2024-02-05

## 2024-02-05 ENCOUNTER — LAB REQUISITION (OUTPATIENT)
Dept: LAB | Facility: CLINIC | Age: 79
End: 2024-02-05
Payer: COMMERCIAL

## 2024-02-05 ENCOUNTER — TRANSITIONAL CARE UNIT VISIT (OUTPATIENT)
Dept: GERIATRICS | Facility: CLINIC | Age: 79
End: 2024-02-05
Payer: COMMERCIAL

## 2024-02-05 VITALS
HEART RATE: 64 BPM | HEIGHT: 69 IN | TEMPERATURE: 96.9 F | BODY MASS INDEX: 22.81 KG/M2 | RESPIRATION RATE: 20 BRPM | DIASTOLIC BLOOD PRESSURE: 57 MMHG | OXYGEN SATURATION: 94 % | WEIGHT: 154 LBS | SYSTOLIC BLOOD PRESSURE: 114 MMHG
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
## 2024-02-05 DIAGNOSIS — Z51.81 ENCOUNTER FOR THERAPEUTIC DRUG LEVEL MONITORING: ICD-10-CM

## 2024-02-05 DIAGNOSIS — R52 PAIN MANAGEMENT: ICD-10-CM

## 2024-02-05 DIAGNOSIS — R53.81 PHYSICAL DECONDITIONING: Primary | ICD-10-CM

## 2024-02-05 PROBLEM — N18.1 CHRONIC KIDNEY DISEASE (CKD), STAGE I: Status: ACTIVE | Noted: 2020-04-18

## 2024-02-05 PROBLEM — K40.20 NON-RECURRENT BILATERAL INGUINAL HERNIA WITHOUT OBSTRUCTION OR GANGRENE: Status: ACTIVE | Noted: 2024-02-05

## 2024-02-05 PROBLEM — I10 HYPERTENSION: Status: ACTIVE | Noted: 2024-02-05

## 2024-02-05 PROCEDURE — 99309 SBSQ NF CARE MODERATE MDM 30: CPT | Performed by: NURSE PRACTITIONER

## 2024-02-05 NOTE — LETTER
2/5/2024        RE: Pa García  [address] [facility] [place] [address]         HEALTH GERIATRIC SERVICES  Chief Complaint   Patient presents with     [facility] F/U     [place] Medical Record Number:  [id number]  Place of Service where encounter took place:  [facility] ([facility]) [[id number]]  Code Status:  Full Code     HISTORY:      HPI:  Pa García  is 78 year old (1945) undergoing physical and occupational therapy. He is with past medical history hypertension, chronic bilateral hip pain, diabetes type 2, GERD, HDL,Excerpted from records  He presented  for progressively worsening generalized weakness and mechanical fall.  No LOS, did not hit head.  No associated symptoms.  No chest pain or palpitations. Presented afebrile, vitally stable, satting well on RA.  He had mild elevation of troponin that down trended on repeat otherwise CBC, CMP Pro-Julio, lactic acid, UA, chest x-ray, UA, flu/RSV/COVID were unremarkable.  Blood cultures have been no growth to date.  He was also noted to have atrial flutter with an elevated troponin per records the flutter appears to be new.  He was asymptomatic, cardiology was consulted and he had an echo that had normal LV function without wall motion abnormalities.  He was recommended he be a candidate for anticoagulation however patient refused due to history of recurrent falls.  He plans to follow-up outpatient cardiology for possible electrophysiology consult       Today he was seen at the bedside to review vital signs, labs, routine visit and to establish care.  He denied chest pain shortness of breath cough congestion constipation or diarrhea.  He is with chronic right foot drop.  Type II diabetic on metformin.  He does have chronic pain bilateral hips left more pronounced than the right.  Per hospital records he did receive bilateral hip injections in the hospital.  Per his report he ambulates with a cane and a four-wheel walker for longer distances.  Labs reviewed  from 2/2/2024 hemoglobin 9.0, creatinine 1.38 down from 1.57 GFR 52 up from 45    ALLERGIES:Ezetimibe-simvastatin    PAST MEDICAL HISTORY: History reviewed. No pertinent past medical history.    PAST SURGICAL HISTORY:   has a past surgical history that includes back surgery; Lumbar Laminectomy (Bilateral, 3/8/2016); and Lumbar Laminectomy (3/13/2016).    FAMILY HISTORY: family history includes Cancer in his maternal grandfather, maternal grandmother, paternal grandfather, and paternal grandmother; Dementia in his father.    SOCIAL HISTORY:  reports that he has never smoked. He has never used smokeless tobacco. He reports current alcohol use. He reports that he does not use drugs.    ROS:  Constitutional: Negative for activity change, appetite change, fatigue and fever. HX Falls  HENT: Negative for congestion.    Respiratory: Negative for cough, shortness of breath and wheezing.    Cardiovascular: Negative for chest pain and leg swelling.   Gastrointestinal: Negative for abdominal distention, abdominal pain, constipation, diarrhea and nausea.   Genitourinary: Negative for dysuria.   Musculoskeletal: Positive  for arthralgia. Negative for back pain. Bilateral hips   Skin: Negative for color change and wound. Per records stage 1 coccyx   Neurological: Negative for dizziness.   Psychiatric/Behavioral: Negative for agitation, behavioral problems and confusion.     Physical Exam:  Constitutional:       Appearance: Patient is well-developed.   HENT:      Head: Normocephalic.   Eyes:      Conjunctiva/sclera: Conjunctivae normal.   Neck:      Musculoskeletal: Normal range of motion.   Cardiovascular:      Rate and Rhythm: Normal rate and regular rhythm.      Heart sounds: Normal heart sounds. No murmur.   Pulmonary:      Effort: No respiratory distress.      Breath sounds: Normal breath sounds. No wheezing or rales.   Abdominal:      General: Bowel sounds are normal. There is no distension.      Palpations: Abdomen is soft.  "     Tenderness: There is no abdominal tenderness.   Musculoskeletal:       Normal range of motion.     Skin:General:        Skin is warm.   Neurological:         Mental Status: Patient is alert and oriented to person, place, and time.   Psychiatric:         Behavior: Behavior normal.     Vitals:/57   Pulse 64   Temp 96.9  F (36.1  C)   Resp 20   Ht 1.753 m (5' 9\")   Wt 69.9 kg (154 lb)   SpO2 94%   BMI 22.74 kg/m   and Body mass index is 22.74 kg/m .    Lab/Diagnostic data:   Recent Results (from the past 240 hour(s))   ECG 12-LEAD WITH MUSE ([facility])    Collection Time: 02/01/24 11:08 PM   Result Value Ref Range    Systolic Blood Pressure  mmHg    Diastolic Blood Pressure  mmHg    Ventricular Rate 95 BPM    Atrial Rate 95 BPM    AL Interval  ms    QRS Duration 92 ms     ms    QTc 439 ms    P Axis  degrees    R AXIS 28 degrees    T Axis 25 degrees    Interpretation ECG       Sinus rhythm with A-V dissociation and Accelerated Junctional rhythm with occasional Premature ventricular complexes  Incomplete right bundle branch block  Septal infarct , age undetermined  Abnormal ECG  When compared with ECG of 08-JUN-2021 13:20,  Junctional rhythm has replaced Sinus rhythm  Incomplete right bundle branch block is now Present  Septal infarct is now Present     Comprehensive metabolic panel    Collection Time: 02/01/24 11:10 PM   Result Value Ref Range    Sodium 143 135 - 145 mmol/L    Potassium 4.6 3.4 - 5.3 mmol/L    Carbon Dioxide (CO2) 22 22 - 29 mmol/L    Anion Gap 11 7 - 15 mmol/L    Urea Nitrogen 31.1 (H) 8.0 - 23.0 mg/dL    Creatinine 1.76 (H) 0.67 - 1.17 mg/dL    GFR Estimate 39 (L) >60 mL/min/1.73m2    Calcium 9.6 8.8 - 10.2 mg/dL    Chloride 110 (H) 98 - 107 mmol/L    Glucose 107 (H) 70 - 99 mg/dL    Alkaline Phosphatase 70 40 - 150 U/L    AST 20 0 - 45 U/L    ALT 10 0 - 70 U/L    Protein Total 6.8 6.4 - 8.3 g/dL    Albumin 4.2 3.5 - 5.2 g/dL    Bilirubin Total 0.4 <=1.2 mg/dL   Troponin T, High " Sensitivity (now)    Collection Time: 02/01/24 11:10 PM   Result Value Ref Range    Troponin T, High Sensitivity 85 (H) <=22 ng/L   Procalcitonin    Collection Time: 02/01/24 11:10 PM   Result Value Ref Range    Procalcitonin 0.29 <0.50 ng/mL   Lactic acid whole blood    Collection Time: 02/01/24 11:10 PM   Result Value Ref Range    Lactic Acid 1.5 0.7 - 2.0 mmol/L   Blood Culture Peripheral Blood    Collection Time: 02/01/24 11:10 PM    Specimen: Peripheral Blood   Result Value Ref Range    Culture No growth after 3 days    CBC with platelets and differential    Collection Time: 02/01/24 11:10 PM   Result Value Ref Range    WBC Count 10.8 4.0 - 11.0 10e3/uL    RBC Count 2.96 (L) 4.40 - 5.90 10e6/uL    Hemoglobin 9.6 (L) 13.3 - 17.7 g/dL    Hematocrit 29.1 (L) 40.0 - 53.0 %    MCV 98 78 - 100 fL    MCH 32.4 26.5 - 33.0 pg    MCHC 33.0 31.5 - 36.5 g/dL    RDW 13.5 10.0 - 15.0 %    Platelet Count 263 150 - 450 10e3/uL    % Neutrophils 83 %    % Lymphocytes 8 %    % Monocytes 8 %    % Eosinophils 0 %    % Basophils 0 %    % Immature Granulocytes 1 %    NRBCs per 100 WBC 0 <1 /100    Absolute Neutrophils 9.0 (H) 1.6 - 8.3 10e3/uL    Absolute Lymphocytes 0.8 0.8 - 5.3 10e3/uL    Absolute Monocytes 0.9 0.0 - 1.3 10e3/uL    Absolute Eosinophils 0.0 0.0 - 0.7 10e3/uL    Absolute Basophils 0.0 0.0 - 0.2 10e3/uL    Absolute Immature Granulocytes 0.1 <=0.4 10e3/uL    Absolute NRBCs 0.0 10e3/uL   Hemoglobin A1c    Collection Time: 02/01/24 11:10 PM   Result Value Ref Range    Hemoglobin A1C 6.7 (H) <5.7 %   Blood Culture Peripheral Blood    Collection Time: 02/02/24 12:01 AM    Specimen: Peripheral Blood   Result Value Ref Range    Culture No growth after 3 days    Asymptomatic Influenza A/B, RSV, & SARS-CoV2 PCR (COVID-19) Nasopharyngeal    Collection Time: 02/02/24 12:13 AM    Specimen: Nasopharyngeal; Swab   Result Value Ref Range    Influenza A PCR Negative Negative    Influenza B PCR Negative Negative    RSV PCR Negative  Negative    SARS CoV2 PCR Negative Negative   UA with Microscopic reflex to Culture    Collection Time: 02/02/24  1:41 AM    Specimen: Urine, Catheter   Result Value Ref Range    Color Urine Light Yellow Colorless, Straw, Light Yellow, Yellow    Appearance Urine Clear Clear    Glucose Urine Negative Negative mg/dL    Bilirubin Urine Negative Negative    Ketones Urine Negative Negative mg/dL    Specific Gravity Urine 1.011 1.001 - 1.030    Blood Urine Negative Negative    pH Urine 5.0 5.0 - 7.0    Protein Albumin Urine 10 (A) Negative mg/dL    Urobilinogen Urine <2.0 <2.0 mg/dL    Nitrite Urine Negative Negative    Leukocyte Esterase Urine Negative Negative    Bacteria Urine Few (A) None Seen /HPF    Mucus Urine Present (A) None Seen /LPF    RBC Urine 1 <=2 /HPF    WBC Urine 1 <=5 /HPF    Squamous Epithelials Urine <1 <=1 /HPF    Hyaline Casts Urine 8 (H) <=2 /LPF   Troponin T, High Sensitivity (now)    Collection Time: 02/02/24  2:39 AM   Result Value Ref Range    Troponin T, High Sensitivity 70 (H) <=22 ng/L   TSH    Collection Time: 02/02/24  2:39 AM   Result Value Ref Range    TSH 2.62 0.30 - 4.20 uIU/mL   Basic metabolic panel    Collection Time: 02/02/24  5:40 AM   Result Value Ref Range    Sodium 143 135 - 145 mmol/L    Potassium 4.1 3.4 - 5.3 mmol/L    Chloride 112 (H) 98 - 107 mmol/L    Carbon Dioxide (CO2) 23 22 - 29 mmol/L    Anion Gap 8 7 - 15 mmol/L    Urea Nitrogen 28.9 (H) 8.0 - 23.0 mg/dL    Creatinine 1.57 (H) 0.67 - 1.17 mg/dL    GFR Estimate 45 (L) >60 mL/min/1.73m2    Calcium 8.9 8.8 - 10.2 mg/dL    Glucose 96 70 - 99 mg/dL   CBC with platelets    Collection Time: 02/02/24  5:40 AM   Result Value Ref Range    WBC Count 8.4 4.0 - 11.0 10e3/uL    RBC Count 2.74 (L) 4.40 - 5.90 10e6/uL    Hemoglobin 9.0 (L) 13.3 - 17.7 g/dL    Hematocrit 27.2 (L) 40.0 - 53.0 %    MCV 99 78 - 100 fL    MCH 32.8 26.5 - 33.0 pg    MCHC 33.1 31.5 - 36.5 g/dL    RDW 13.7 10.0 - 15.0 %    Platelet Count 232 150 - 450  10e3/uL   Glucose by meter    Collection Time: 02/02/24  8:30 AM   Result Value Ref Range    GLUCOSE BY METER POCT 104 (H) 70 - 99 mg/dL   Basic metabolic panel    Collection Time: 02/03/24  4:19 AM   Result Value Ref Range    Sodium 141 135 - 145 mmol/L    Potassium 4.0 3.4 - 5.3 mmol/L    Chloride 108 (H) 98 - 107 mmol/L    Carbon Dioxide (CO2) 22 22 - 29 mmol/L    Anion Gap 11 7 - 15 mmol/L    Urea Nitrogen 23.2 (H) 8.0 - 23.0 mg/dL    Creatinine 1.38 (H) 0.67 - 1.17 mg/dL    GFR Estimate 52 (L) >60 mL/min/1.73m2    Calcium 9.5 8.8 - 10.2 mg/dL    Glucose 137 (H) 70 - 99 mg/dL   Glucose by meter    Collection Time: 02/04/24  2:08 PM   Result Value Ref Range    GLUCOSE BY METER POCT 175 (H) 70 - 99 mg/dL        MEDICATIONS:     Review of your medicines            Accurate as of February 5, 2024  1:07 PM. If you have any questions, ask your nurse or doctor.                CONTINUE these medicines which have NOT CHANGED        Dose / Directions   acetaminophen 325 MG tablet  Commonly known as: TYLENOL      Dose: 650 mg  [ACETAMINOPHEN (TYLENOL) 325 MG TABLET] Take 650 mg by mouth every 4 (four) hours as needed for pain.  Refills: 0     amLODIPine 2.5 MG tablet  Commonly known as: NORVASC      Dose: 2.5 mg  Take 2.5 mg by mouth daily  Refills: 0     aspirin 81 MG EC tablet  Commonly known as: ASA      Dose: 81 mg  [ASPIRIN 81 MG EC TABLET] Take 81 mg by mouth daily.  Refills: 0     gabapentin 300 MG capsule  Commonly known as: NEURONTIN      Dose: 300 mg  Take 300 mg by mouth 2 times daily  Refills: 0     hydrochlorothiazide 12.5 MG tablet  Commonly known as: HYDRODIURIL      Dose: 12.5 mg  [HYDROCHLOROTHIAZIDE (HYDRODIURIL) 12.5 MG TABLET] Take 12.5 mg by mouth daily.  Refills: 0     LANsoprazole 30 MG DR capsule  Commonly known as: PREVACID      Dose: 30 mg  Take 30 mg by mouth at bedtime  Refills: 0     metFORMIN 1000 MG tablet  Commonly known as: GLUCOPHAGE      Dose: 1,000 mg  [METFORMIN (GLUCOPHAGE) 1000 MG  TABLET] Take 1,000 mg by mouth 2 (two) times a day with meals.  Refills: 0     mineral oil-hydrophilic petrolatum external ointment  Used for: Skin tear of left elbow without complication, initial encounter, Skin tear of left lower leg without complication, initial encounter, Skin tear of right lower leg without complication, initial encounter, Abrasion of chin, initial encounter      [WHITE PETROLATUM (AQUAPHOR NATURAL HEALING) 41 % OINT] Apply to wounds daily as needed.  Quantity: 1 Tube  Refills: 0     polyethylene glycol 17 GM/Dose powder  Commonly known as: MIRALAX  Used for: Other constipation      Dose: 17 g  Take 17 g by mouth daily as needed for constipation  Quantity: 510 g  Refills: 0     PreserVision AREDS 2 Caps      Dose: 1 capsule  [VIT C,T-HI-SWPSZ-LUTEIN-ZEAXAN (PRESERVISION AREDS-2) 250-90-40-1 MG CAP] Take 1 capsule by mouth 2 (two) times a day.  Refills: 0     simvastatin 20 MG tablet  Commonly known as: ZOCOR  Used for: Hyperlipidemia, unspecified hyperlipidemia type      Dose: 20 mg  Take 1 tablet (20 mg) by mouth at bedtime  Quantity: 90 tablet  Refills: 0     valsartan 160 MG tablet  Commonly known as: DIOVAN      Dose: 160 mg  [VALSARTAN (DIOVAN) 160 MG TABLET] Take 160 mg by mouth daily.  Refills: 0            STOP taking      UNABLE TO FIND  Stopped by: Geneva Jaramillo CNP        UNABLE TO FIND  Stopped by: Geneva Jaramillo CNP        UNABLE TO FIND  Stopped by: Geneva Jaramillo CNP                 ASSESSMENT/PLAN  Encounter Diagnoses   Name Primary?     Physical deconditioning Yes     Fall, subsequent encounter      Pain management      Physical deconditioning PT OT    Falls will continue with therapy for strengthening    Pain management as needed Tylenol, gabapentin 300 mg twice daily,     Hypertension on Norvasc 2.5 mg daily, hydrochlorothiazide , Valsartan    diabetes type 2 on metformin 1000 mg twice daily    GERD on Prevacid HDL on simvastatin    Electronically signed by: Geneva Jaramillo CNP        Sincerely,        Geneva Jaramillo CNP

## 2024-02-05 NOTE — PROGRESS NOTES
[facility] GERIATRIC SERVICES  Chief Complaint   Patient presents with    [facility] F/U     [place] Medical Record Number:  [id number]  Place of Service where encounter took place:  [facility] ([facility]) [[id number]]  Code Status:  Full Code     HISTORY:      HPI:  Pa García  is 78 year old (1945) undergoing physical and occupational therapy. He is with past medical history hypertension, chronic bilateral hip pain, diabetes type 2, GERD, HDL,Excerpted from records  He presented  for progressively worsening generalized weakness and mechanical fall.  No LOS, did not hit head.  No associated symptoms.  No chest pain or palpitations. Presented afebrile, vitally stable, satting well on RA.  He had mild elevation of troponin that down trended on repeat otherwise CBC, CMP Pro-Julio, lactic acid, UA, chest x-ray, UA, flu/RSV/COVID were unremarkable.  Blood cultures have been no growth to date.  He was also noted to have atrial flutter with an elevated troponin per records the flutter appears to be new.  He was asymptomatic, cardiology was consulted and he had an echo that had normal LV function without wall motion abnormalities.  He was recommended he be a candidate for anticoagulation however patient refused due to history of recurrent falls.  He plans to follow-up outpatient cardiology for possible electrophysiology consult       Today he was seen at the bedside to review vital signs, labs, routine visit and to establish care.  He denied chest pain shortness of breath cough congestion constipation or diarrhea.  He is with chronic right foot drop.  Type II diabetic on metformin.  He does have chronic pain bilateral hips left more pronounced than the right.  Per hospital records he did receive bilateral hip injections in the hospital.  Per his report he ambulates with a cane and a four-wheel walker for longer distances.  Labs reviewed from 2/2/2024 hemoglobin 9.0, creatinine 1.38 down from 1.57 GFR 52 up from  45    ALLERGIES:Ezetimibe-simvastatin    PAST MEDICAL HISTORY: History reviewed. No pertinent past medical history.    PAST SURGICAL HISTORY:   has a past surgical history that includes back surgery; Lumbar Laminectomy (Bilateral, 3/8/2016); and Lumbar Laminectomy (3/13/2016).    FAMILY HISTORY: family history includes Cancer in his maternal grandfather, maternal grandmother, paternal grandfather, and paternal grandmother; Dementia in his father.    SOCIAL HISTORY:  reports that he has never smoked. He has never used smokeless tobacco. He reports current alcohol use. He reports that he does not use drugs.    ROS:  Constitutional: Negative for activity change, appetite change, fatigue and fever. HX Falls  HENT: Negative for congestion.    Respiratory: Negative for cough, shortness of breath and wheezing.    Cardiovascular: Negative for chest pain and leg swelling.   Gastrointestinal: Negative for abdominal distention, abdominal pain, constipation, diarrhea and nausea.   Genitourinary: Negative for dysuria.   Musculoskeletal: Positive  for arthralgia. Negative for back pain. Bilateral hips   Skin: Negative for color change and wound. Per records stage 1 coccyx   Neurological: Negative for dizziness.   Psychiatric/Behavioral: Negative for agitation, behavioral problems and confusion.     Physical Exam:  Constitutional:       Appearance: Patient is well-developed.   HENT:      Head: Normocephalic.   Eyes:      Conjunctiva/sclera: Conjunctivae normal.   Neck:      Musculoskeletal: Normal range of motion.   Cardiovascular:      Rate and Rhythm: Normal rate and regular rhythm.      Heart sounds: Normal heart sounds. No murmur.   Pulmonary:      Effort: No respiratory distress.      Breath sounds: Normal breath sounds. No wheezing or rales.   Abdominal:      General: Bowel sounds are normal. There is no distension.      Palpations: Abdomen is soft.      Tenderness: There is no abdominal tenderness.   Musculoskeletal:        "Normal range of motion.     Skin:General:        Skin is warm.   Neurological:         Mental Status: Patient is alert and oriented to person, place, and time.   Psychiatric:         Behavior: Behavior normal.     Vitals:/57   Pulse 64   Temp 96.9  F (36.1  C)   Resp 20   Ht 1.753 m (5' 9\")   Wt 69.9 kg (154 lb)   SpO2 94%   BMI 22.74 kg/m   and Body mass index is 22.74 kg/m .    Lab/Diagnostic data:   Recent Results (from the past 240 hour(s))   ECG 12-LEAD WITH MUSE ([facility])    Collection Time: 02/01/24 11:08 PM   Result Value Ref Range    Systolic Blood Pressure  mmHg    Diastolic Blood Pressure  mmHg    Ventricular Rate 95 BPM    Atrial Rate 95 BPM    WV Interval  ms    QRS Duration 92 ms     ms    QTc 439 ms    P Axis  degrees    R AXIS 28 degrees    T Axis 25 degrees    Interpretation ECG       Sinus rhythm with A-V dissociation and Accelerated Junctional rhythm with occasional Premature ventricular complexes  Incomplete right bundle branch block  Septal infarct , age undetermined  Abnormal ECG  When compared with ECG of 08-JUN-2021 13:20,  Junctional rhythm has replaced Sinus rhythm  Incomplete right bundle branch block is now Present  Septal infarct is now Present     Comprehensive metabolic panel    Collection Time: 02/01/24 11:10 PM   Result Value Ref Range    Sodium 143 135 - 145 mmol/L    Potassium 4.6 3.4 - 5.3 mmol/L    Carbon Dioxide (CO2) 22 22 - 29 mmol/L    Anion Gap 11 7 - 15 mmol/L    Urea Nitrogen 31.1 (H) 8.0 - 23.0 mg/dL    Creatinine 1.76 (H) 0.67 - 1.17 mg/dL    GFR Estimate 39 (L) >60 mL/min/1.73m2    Calcium 9.6 8.8 - 10.2 mg/dL    Chloride 110 (H) 98 - 107 mmol/L    Glucose 107 (H) 70 - 99 mg/dL    Alkaline Phosphatase 70 40 - 150 U/L    AST 20 0 - 45 U/L    ALT 10 0 - 70 U/L    Protein Total 6.8 6.4 - 8.3 g/dL    Albumin 4.2 3.5 - 5.2 g/dL    Bilirubin Total 0.4 <=1.2 mg/dL   Troponin T, High Sensitivity (now)    Collection Time: 02/01/24 11:10 PM   Result Value Ref Range "    Troponin T, High Sensitivity 85 (H) <=22 ng/L   Procalcitonin    Collection Time: 02/01/24 11:10 PM   Result Value Ref Range    Procalcitonin 0.29 <0.50 ng/mL   Lactic acid whole blood    Collection Time: 02/01/24 11:10 PM   Result Value Ref Range    Lactic Acid 1.5 0.7 - 2.0 mmol/L   Blood Culture Peripheral Blood    Collection Time: 02/01/24 11:10 PM    Specimen: Peripheral Blood   Result Value Ref Range    Culture No growth after 3 days    CBC with platelets and differential    Collection Time: 02/01/24 11:10 PM   Result Value Ref Range    WBC Count 10.8 4.0 - 11.0 10e3/uL    RBC Count 2.96 (L) 4.40 - 5.90 10e6/uL    Hemoglobin 9.6 (L) 13.3 - 17.7 g/dL    Hematocrit 29.1 (L) 40.0 - 53.0 %    MCV 98 78 - 100 fL    MCH 32.4 26.5 - 33.0 pg    MCHC 33.0 31.5 - 36.5 g/dL    RDW 13.5 10.0 - 15.0 %    Platelet Count 263 150 - 450 10e3/uL    % Neutrophils 83 %    % Lymphocytes 8 %    % Monocytes 8 %    % Eosinophils 0 %    % Basophils 0 %    % Immature Granulocytes 1 %    NRBCs per 100 WBC 0 <1 /100    Absolute Neutrophils 9.0 (H) 1.6 - 8.3 10e3/uL    Absolute Lymphocytes 0.8 0.8 - 5.3 10e3/uL    Absolute Monocytes 0.9 0.0 - 1.3 10e3/uL    Absolute Eosinophils 0.0 0.0 - 0.7 10e3/uL    Absolute Basophils 0.0 0.0 - 0.2 10e3/uL    Absolute Immature Granulocytes 0.1 <=0.4 10e3/uL    Absolute NRBCs 0.0 10e3/uL   Hemoglobin A1c    Collection Time: 02/01/24 11:10 PM   Result Value Ref Range    Hemoglobin A1C 6.7 (H) <5.7 %   Blood Culture Peripheral Blood    Collection Time: 02/02/24 12:01 AM    Specimen: Peripheral Blood   Result Value Ref Range    Culture No growth after 3 days    Asymptomatic Influenza A/B, RSV, & SARS-CoV2 PCR (COVID-19) Nasopharyngeal    Collection Time: 02/02/24 12:13 AM    Specimen: Nasopharyngeal; Swab   Result Value Ref Range    Influenza A PCR Negative Negative    Influenza B PCR Negative Negative    RSV PCR Negative Negative    SARS CoV2 PCR Negative Negative   UA with Microscopic reflex to  Culture    Collection Time: 02/02/24  1:41 AM    Specimen: Urine, Catheter   Result Value Ref Range    Color Urine Light Yellow Colorless, Straw, Light Yellow, Yellow    Appearance Urine Clear Clear    Glucose Urine Negative Negative mg/dL    Bilirubin Urine Negative Negative    Ketones Urine Negative Negative mg/dL    Specific Gravity Urine 1.011 1.001 - 1.030    Blood Urine Negative Negative    pH Urine 5.0 5.0 - 7.0    Protein Albumin Urine 10 (A) Negative mg/dL    Urobilinogen Urine <2.0 <2.0 mg/dL    Nitrite Urine Negative Negative    Leukocyte Esterase Urine Negative Negative    Bacteria Urine Few (A) None Seen /HPF    Mucus Urine Present (A) None Seen /LPF    RBC Urine 1 <=2 /HPF    WBC Urine 1 <=5 /HPF    Squamous Epithelials Urine <1 <=1 /HPF    Hyaline Casts Urine 8 (H) <=2 /LPF   Troponin T, High Sensitivity (now)    Collection Time: 02/02/24  2:39 AM   Result Value Ref Range    Troponin T, High Sensitivity 70 (H) <=22 ng/L   TSH    Collection Time: 02/02/24  2:39 AM   Result Value Ref Range    TSH 2.62 0.30 - 4.20 uIU/mL   Basic metabolic panel    Collection Time: 02/02/24  5:40 AM   Result Value Ref Range    Sodium 143 135 - 145 mmol/L    Potassium 4.1 3.4 - 5.3 mmol/L    Chloride 112 (H) 98 - 107 mmol/L    Carbon Dioxide (CO2) 23 22 - 29 mmol/L    Anion Gap 8 7 - 15 mmol/L    Urea Nitrogen 28.9 (H) 8.0 - 23.0 mg/dL    Creatinine 1.57 (H) 0.67 - 1.17 mg/dL    GFR Estimate 45 (L) >60 mL/min/1.73m2    Calcium 8.9 8.8 - 10.2 mg/dL    Glucose 96 70 - 99 mg/dL   CBC with platelets    Collection Time: 02/02/24  5:40 AM   Result Value Ref Range    WBC Count 8.4 4.0 - 11.0 10e3/uL    RBC Count 2.74 (L) 4.40 - 5.90 10e6/uL    Hemoglobin 9.0 (L) 13.3 - 17.7 g/dL    Hematocrit 27.2 (L) 40.0 - 53.0 %    MCV 99 78 - 100 fL    MCH 32.8 26.5 - 33.0 pg    MCHC 33.1 31.5 - 36.5 g/dL    RDW 13.7 10.0 - 15.0 %    Platelet Count 232 150 - 450 10e3/uL   Glucose by meter    Collection Time: 02/02/24  8:30 AM   Result Value  Ref Range    GLUCOSE BY METER POCT 104 (H) 70 - 99 mg/dL   Basic metabolic panel    Collection Time: 02/03/24  4:19 AM   Result Value Ref Range    Sodium 141 135 - 145 mmol/L    Potassium 4.0 3.4 - 5.3 mmol/L    Chloride 108 (H) 98 - 107 mmol/L    Carbon Dioxide (CO2) 22 22 - 29 mmol/L    Anion Gap 11 7 - 15 mmol/L    Urea Nitrogen 23.2 (H) 8.0 - 23.0 mg/dL    Creatinine 1.38 (H) 0.67 - 1.17 mg/dL    GFR Estimate 52 (L) >60 mL/min/1.73m2    Calcium 9.5 8.8 - 10.2 mg/dL    Glucose 137 (H) 70 - 99 mg/dL   Glucose by meter    Collection Time: 02/04/24  2:08 PM   Result Value Ref Range    GLUCOSE BY METER POCT 175 (H) 70 - 99 mg/dL        MEDICATIONS:     Review of your medicines            Accurate as of February 5, 2024  1:07 PM. If you have any questions, ask your nurse or doctor.                CONTINUE these medicines which have NOT CHANGED        Dose / Directions   acetaminophen 325 MG tablet  Commonly known as: TYLENOL      Dose: 650 mg  [ACETAMINOPHEN (TYLENOL) 325 MG TABLET] Take 650 mg by mouth every 4 (four) hours as needed for pain.  Refills: 0     amLODIPine 2.5 MG tablet  Commonly known as: NORVASC      Dose: 2.5 mg  Take 2.5 mg by mouth daily  Refills: 0     aspirin 81 MG EC tablet  Commonly known as: ASA      Dose: 81 mg  [ASPIRIN 81 MG EC TABLET] Take 81 mg by mouth daily.  Refills: 0     gabapentin 300 MG capsule  Commonly known as: NEURONTIN      Dose: 300 mg  Take 300 mg by mouth 2 times daily  Refills: 0     hydrochlorothiazide 12.5 MG tablet  Commonly known as: HYDRODIURIL      Dose: 12.5 mg  [HYDROCHLOROTHIAZIDE (HYDRODIURIL) 12.5 MG TABLET] Take 12.5 mg by mouth daily.  Refills: 0     LANsoprazole 30 MG DR capsule  Commonly known as: PREVACID      Dose: 30 mg  Take 30 mg by mouth at bedtime  Refills: 0     metFORMIN 1000 MG tablet  Commonly known as: GLUCOPHAGE      Dose: 1,000 mg  [METFORMIN (GLUCOPHAGE) 1000 MG TABLET] Take 1,000 mg by mouth 2 (two) times a day with meals.  Refills: 0      mineral oil-hydrophilic petrolatum external ointment  Used for: Skin tear of left elbow without complication, initial encounter, Skin tear of left lower leg without complication, initial encounter, Skin tear of right lower leg without complication, initial encounter, Abrasion of chin, initial encounter      [WHITE PETROLATUM (AQUAPHOR NATURAL HEALING) 41 % OINT] Apply to wounds daily as needed.  Quantity: 1 Tube  Refills: 0     polyethylene glycol 17 GM/Dose powder  Commonly known as: MIRALAX  Used for: Other constipation      Dose: 17 g  Take 17 g by mouth daily as needed for constipation  Quantity: 510 g  Refills: 0     PreserVision AREDS 2 Caps      Dose: 1 capsule  [VIT C,L-GT-EBZGQ-LUTEIN-ZEAXAN (PRESERVISION AREDS-2) 250-90-40-1 MG CAP] Take 1 capsule by mouth 2 (two) times a day.  Refills: 0     simvastatin 20 MG tablet  Commonly known as: ZOCOR  Used for: Hyperlipidemia, unspecified hyperlipidemia type      Dose: 20 mg  Take 1 tablet (20 mg) by mouth at bedtime  Quantity: 90 tablet  Refills: 0     valsartan 160 MG tablet  Commonly known as: DIOVAN      Dose: 160 mg  [VALSARTAN (DIOVAN) 160 MG TABLET] Take 160 mg by mouth daily.  Refills: 0            STOP taking      UNABLE TO FIND  Stopped by: Geneva Jaramillo CNP        UNABLE TO FIND  Stopped by: Geneva Jaramillo CNP        UNABLE TO FIND  Stopped by: Geneva Jaramillo CNP                 ASSESSMENT/PLAN  Encounter Diagnoses   Name Primary?    Physical deconditioning Yes    Fall, subsequent encounter     Pain management      Physical deconditioning PT OT    Falls will continue with therapy for strengthening    Pain management as needed Tylenol, gabapentin 300 mg twice daily,     Hypertension on Norvasc 2.5 mg daily, hydrochlorothiazide , Valsartan    diabetes type 2 on metformin 1000 mg twice daily    GERD on Prevacid HDL on simvastatin    Electronically signed by: Geneva Jaramillo CNP    Self

## 2024-02-05 NOTE — PROGRESS NOTES
Occupational Therapy Discharge Summary    Reason for therapy discharge:    Discharged to transitional care facility.    Progress towards therapy goal(s). See goals on Care Plan in [facility] electronic health record for goal details.  Goals not met.  Barriers to achieving goals:   discharge from facility.    Therapy recommendation(s):    Continued therapy is recommended.  Rationale/Recommendations:  OT at TCU for continued improvement in strength, safety and ADL..

## 2024-02-06 ENCOUNTER — TELEPHONE (OUTPATIENT)
Dept: GERIATRICS | Facility: CLINIC | Age: 79
End: 2024-02-06

## 2024-02-06 LAB
ANION GAP SERPL CALCULATED.3IONS-SCNC: 14 MMOL/L (ref 7–15)
BUN SERPL-MCNC: 52.1 MG/DL (ref 8–23)
CALCIUM SERPL-MCNC: 8.4 MG/DL (ref 8.8–10.2)
CHLORIDE SERPL-SCNC: 103 MMOL/L (ref 98–107)
CREAT SERPL-MCNC: 2.54 MG/DL (ref 0.67–1.17)
DEPRECATED HCO3 PLAS-SCNC: 16 MMOL/L (ref 22–29)
EGFRCR SERPLBLD CKD-EPI 2021: 25 ML/MIN/1.73M2
ERYTHROCYTE [DISTWIDTH] IN BLOOD BY AUTOMATED COUNT: 14 % (ref 10–15)
GLUCOSE SERPL-MCNC: 132 MG/DL (ref 70–99)
HCT VFR BLD AUTO: 26.7 % (ref 40–53)
HGB BLD-MCNC: 8.6 G/DL (ref 13.3–17.7)
MCH RBC QN AUTO: 32.3 PG (ref 26.5–33)
MCHC RBC AUTO-ENTMCNC: 32.2 G/DL (ref 31.5–36.5)
MCV RBC AUTO: 100 FL (ref 78–100)
PLATELET # BLD AUTO: 300 10E3/UL (ref 150–450)
POTASSIUM SERPL-SCNC: 5.7 MMOL/L (ref 3.4–5.3)
RBC # BLD AUTO: 2.66 10E6/UL (ref 4.4–5.9)
SODIUM SERPL-SCNC: 133 MMOL/L (ref 135–145)
WBC # BLD AUTO: 11.9 10E3/UL (ref 4–11)

## 2024-02-06 PROCEDURE — P9603 ONE-WAY ALLOW PRORATED MILES: HCPCS | Mod: ORL | Performed by: NURSE PRACTITIONER

## 2024-02-06 PROCEDURE — 85027 COMPLETE CBC AUTOMATED: CPT | Mod: ORL | Performed by: NURSE PRACTITIONER

## 2024-02-06 PROCEDURE — 36415 COLL VENOUS BLD VENIPUNCTURE: CPT | Mod: ORL | Performed by: NURSE PRACTITIONER

## 2024-02-06 PROCEDURE — 80048 BASIC METABOLIC PNL TOTAL CA: CPT | Mod: ORL | Performed by: NURSE PRACTITIONER

## 2024-02-06 NOTE — TELEPHONE ENCOUNTER
[facility] Geriatrics Lab Note     Provider: ELLY Ernst  Facility: [facility]  Facility Type:  TCU    Allergies   Allergen Reactions    Ezetimibe-Simvastatin GI Disturbance       Labs Reviewed by provider: Heme 2---BMP is still in process    Verbal Order/Direction given by Provider: Check Hgb on 2/13/24.  Call on-call with any critical BMP results, otherwise update the provider tomorrow.      Provider giving Order:  ELLY Ernst    Verbal Order given to: Afsaúl([phone])    Jay Jay Koehler RN

## 2024-02-07 ENCOUNTER — TRANSITIONAL CARE UNIT VISIT (OUTPATIENT)
Dept: GERIATRICS | Facility: CLINIC | Age: 79
End: 2024-02-07
Payer: COMMERCIAL

## 2024-02-07 ENCOUNTER — LAB REQUISITION (OUTPATIENT)
Dept: LAB | Facility: CLINIC | Age: 79
End: 2024-02-07
Payer: COMMERCIAL

## 2024-02-07 ENCOUNTER — TELEPHONE (OUTPATIENT)
Dept: GERIATRICS | Facility: CLINIC | Age: 79
End: 2024-02-07

## 2024-02-07 VITALS
WEIGHT: 151 LBS | OXYGEN SATURATION: 97 % | TEMPERATURE: 96.8 F | SYSTOLIC BLOOD PRESSURE: 106 MMHG | RESPIRATION RATE: 18 BRPM | BODY MASS INDEX: 22.36 KG/M2 | DIASTOLIC BLOOD PRESSURE: 54 MMHG | HEART RATE: 93 BPM | HEIGHT: 69 IN

## 2024-02-07 DIAGNOSIS — G82.20 PARAPARESIS (H): ICD-10-CM

## 2024-02-07 DIAGNOSIS — N17.9 AKI (ACUTE KIDNEY INJURY) (H): ICD-10-CM
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
## 2024-02-07 DIAGNOSIS — N18.1 CHRONIC KIDNEY DISEASE (CKD), STAGE I: ICD-10-CM

## 2024-02-07 DIAGNOSIS — I10 PRIMARY HYPERTENSION: ICD-10-CM

## 2024-02-07 DIAGNOSIS — E78.5 HYPERLIPIDEMIA, UNSPECIFIED HYPERLIPIDEMIA TYPE: ICD-10-CM

## 2024-02-07 DIAGNOSIS — M21.371 RIGHT FOOT DROP: ICD-10-CM

## 2024-02-07 DIAGNOSIS — R53.1 GENERALIZED WEAKNESS: ICD-10-CM

## 2024-02-07 DIAGNOSIS — Z51.81 ENCOUNTER FOR THERAPEUTIC DRUG LEVEL MONITORING: ICD-10-CM

## 2024-02-07 DIAGNOSIS — E11.65 TYPE 2 DIABETES MELLITUS WITH HYPERGLYCEMIA, WITHOUT LONG-TERM CURRENT USE OF INSULIN (H): ICD-10-CM

## 2024-02-07 DIAGNOSIS — I48.4 ATYPICAL ATRIAL FLUTTER (H): ICD-10-CM

## 2024-02-07 LAB
BACTERIA BLD CULT: NO GROWTH
BACTERIA BLD CULT: NO GROWTH

## 2024-02-07 PROCEDURE — 99306 1ST NF CARE HIGH MDM 50: CPT | Performed by: FAMILY MEDICINE

## 2024-02-07 NOTE — PROGRESS NOTES
[facility]       Patient Pa García  MRN: [id number]        Reason for Visit     Chief Complaint   Patient presents with    RECHECK     INITIAL       Code Status     CPR/Full code     Assessment/plan       Generalized weakness/recurrent falls  Patient has had aggressive workup done in the hospital including imaging as well as blood work which did not show anything significant.  Blood cultures remain negative.  Discharged to TCU for PT OT  Patient blames it on hip arthritis but does not want to see orthopedics  Discussion done with patient and apparently had a bad outcome from a previous back surgery which resulted in a foot drop and does not want to pursue aggressive outcomes or surgical interventions    Atrial flutter with elevated troponins.  Currently asymptomatic  Patient declined anticoagulation  Advise follow-up with cardiology.  Did discuss this with him and advised to use an Apple Watch to monitor heart rates and follow-up with cardiology as an outpatient to discuss his options  apparently patient is not clear on why he refused anticoagulation.  He also believes that he was told that he does not have a significant atrial flutter.    CKD 3-recheck BMP    Normocytic anemia with a hemoglobin of 9.6  Recheck done today shows a hemoglobin of 8.6.  Monitor trends    Leukocytosis with a white count of 11.9  Recheck in 1 week    Type 2 diabetes with last A1c of 6.7.  Wondering about Dexcom.  Advised and education provided.  He is on oral metformin    Hypertension continue with his amlodipine HCTZ and losartan however medications including losartan and hydrochlorothiazide have been discontinued because of ,hyperkalemia    Hyperkalemia with a recheck potassium of 5.7 taken off HCTZ and valsartan  Hydration recommended.  Kayexalate given today.  Recheck BMP as ordered    Acute ROXIE/CKD  Patient appears to be in acute renal failure with a jump in BUN to 52 and a creatinine of 2.5 from a baseline of  1.38 at discharge  Gentle hydration taken off hydrochlorothiazide and valsartan  Recheck BMP as ordered for tomorrow    Hyponatremia with a recheck sodium of 133  Taken off HCTZ   will monitor on BMP    B/l trochanteric [id number] ortho    Right foot drop patient reports chronic    HLD - simvastatin decreased due to risk of interaction with amlodipine.    Time spent in this visit was 50 minutes including reviewing concerns with the patient as well as labs and follow-up        History     Patient is a very pleasant 78 year old male who is admitted to TCU  Patient admitted post falls which have been recurrent in nature.  He has had multiple falls.  Also seen by cardiology with a new onset flutter however he is rate control and refused any workup he has a history of trochanteric bursitis in his hip and apparently does not any further workup done on that.  Extensive workup done in the hospital was negative currently discharged to the TCU    Past Medical & Surgical History     Past medical history  Hyperlipidemia  Hypertension  Right foot drop  PAST SURGICAL HISTORY:   has a past surgical history that includes back surgery; Lumbar Laminectomy (Bilateral, 3/8/2016); and Lumbar Laminectomy (3/13/2016).      Past Social History     Reviewed,  reports that he has never smoked. He has never used smokeless tobacco. He reports current alcohol use. He reports that he does not use drugs.    Family History     Reviewed, and family history includes Cancer in his maternal grandfather, maternal grandmother, paternal grandfather, and paternal grandmother; Dementia in his father.    Medication List     Current Outpatient Medications   Medication    acetaminophen (TYLENOL) 325 MG tablet    amLODIPine (NORVASC) 2.5 MG tablet    aspirin 81 MG EC tablet    gabapentin (NEURONTIN) 300 MG capsule    lansoprazole (PREVACID) 30 MG capsule    metFORMIN (GLUCOPHAGE) 1000 MG tablet    polyethylene glycol (MIRALAX) 17 GM/Dose powder     "simvastatin (ZOCOR) 20 MG tablet    vit C,Y-Pc-kgumn-lutein-zeaxan (PRESERVISION AREDS-2) 250-90-40-1 mg cap    white petrolatum (AQUAPHOR NATURAL HEALING) 41 % Oint     No current facility-administered medications for this visit.      MED REC REQUIRED  Post Medication Reconciliation Status:  Discharge medications reconciled, continue medications without change       Allergies     Allergies   Allergen Reactions    Ezetimibe-Simvastatin GI Disturbance       Review of Systems   A comprehensive review of 14 systems was done. Pertinent findings noted here and in history of present illness. All the rest negative.  Constitutional: Negative.  Negative for fever, chills, he has  activity change, appetite change and fatigue.   HENT: Negative for congestion and facial swelling.    Eyes: Negative for photophobia, redness and visual disturbance.   Respiratory: Negative for cough and chest tightness.    Cardiovascular: Negative for chest pain, palpitations and leg swelling.   He does not have any concerns with his irregular heart rate and he has never had a racing heart rate  Gastrointestinal: Negative for nausea, diarrhea, constipation, blood in stool and abdominal distention.   Genitourinary: Negative.    Musculoskeletal: Negative.  Reports falls and a chronic right foot drop  Skin: Negative.    Neurological: Negative for dizziness, tremors, syncope, weakness, light-headedness and headaches.   Hematological: Does not bruise/bleed easily.   Psychiatric/Behavioral: Negative.        Physical Exam   /54   Pulse 93   Temp 96.8  F (36  C)   Resp 18   Ht 1.753 m (5' 9\")   Wt 68.5 kg (151 lb)   SpO2 97%   BMI 22.30 kg/m       Constitutional: Oriented to person, place, and time and appears well-developed.   HEENT:  Normocephalic and atraumatic.  Eyes: Conjunctivae and EOM are normal. Pupils are equal, round, and reactive to light. No discharge.  No scleral icterus. Nose normal. Mouth/Throat: Oropharynx is clear and moist. " No oropharyngeal exudate.    NECK: Normal range of motion. Neck supple. No JVD present. No tracheal deviation present. No thyromegaly present.   CARDIOVASCULAR: Normal rate, irregular rhythm and intact distal pulses.  Exam reveals no gallop and no friction rub.  Systolic murmur present.  PULMONARY: Effort normal and breath sounds normal. No respiratory distress.No Wheezing or rales.  ABDOMEN: Soft. Bowel sounds are normal. No distension and no mass.  There is no tenderness. There is no rebound and no guarding. No HSM.  MUSCULOSKELETAL: Normal range of motion. Mild kyphosis, no tenderness.  Rt foot drop  LYMPH NODES: Has no cervical, supraclavicular, axillary and groin adenopathy.   NEUROLOGICAL: Alert and oriented to person, place, and time. No cranial nerve deficit.  Normal muscle tone. Coordination normal.   Right chronic foot drop noted  GENITOURINARY: Deferred exam.  SKIN: Skin is warm and dry. No rash noted. No erythema. No pallor.   EXTREMITIES: No cyanosis, no clubbing, no edema. No Deformity.  PSYCHIATRIC: Normal mood, affect and behavior.      Lab Results     Recent Results (from the past 240 hour(s))   ECG 12-LEAD WITH MUSE ([facility])    Collection Time: 02/01/24 11:08 PM   Result Value Ref Range    Systolic Blood Pressure  mmHg    Diastolic Blood Pressure  mmHg    Ventricular Rate 95 BPM    Atrial Rate 95 BPM    HI Interval  ms    QRS Duration 92 ms     ms    QTc 439 ms    P Axis  degrees    R AXIS 28 degrees    T Axis 25 degrees    Interpretation ECG       Sinus rhythm with A-V dissociation and Accelerated Junctional rhythm with occasional Premature ventricular complexes  Incomplete right bundle branch block  Septal infarct , age undetermined  Abnormal ECG  When compared with ECG of 08-JUN-2021 13:20,  Junctional rhythm has replaced Sinus rhythm  Incomplete right bundle branch block is now Present  Septal infarct is now Present     Comprehensive metabolic panel    Collection Time: 02/01/24 11:10 PM    Result Value Ref Range    Sodium 143 135 - 145 mmol/L    Potassium 4.6 3.4 - 5.3 mmol/L    Carbon Dioxide (CO2) 22 22 - 29 mmol/L    Anion Gap 11 7 - 15 mmol/L    Urea Nitrogen 31.1 (H) 8.0 - 23.0 mg/dL    Creatinine 1.76 (H) 0.67 - 1.17 mg/dL    GFR Estimate 39 (L) >60 mL/min/1.73m2    Calcium 9.6 8.8 - 10.2 mg/dL    Chloride 110 (H) 98 - 107 mmol/L    Glucose 107 (H) 70 - 99 mg/dL    Alkaline Phosphatase 70 40 - 150 U/L    AST 20 0 - 45 U/L    ALT 10 0 - 70 U/L    Protein Total 6.8 6.4 - 8.3 g/dL    Albumin 4.2 3.5 - 5.2 g/dL    Bilirubin Total 0.4 <=1.2 mg/dL   Troponin T, High Sensitivity (now)    Collection Time: 02/01/24 11:10 PM   Result Value Ref Range    Troponin T, High Sensitivity 85 (H) <=22 ng/L   Procalcitonin    Collection Time: 02/01/24 11:10 PM   Result Value Ref Range    Procalcitonin 0.29 <0.50 ng/mL   Lactic acid whole blood    Collection Time: 02/01/24 11:10 PM   Result Value Ref Range    Lactic Acid 1.5 0.7 - 2.0 mmol/L   Blood Culture Peripheral Blood    Collection Time: 02/01/24 11:10 PM    Specimen: Peripheral Blood   Result Value Ref Range    Culture No Growth    CBC with platelets and differential    Collection Time: 02/01/24 11:10 PM   Result Value Ref Range    WBC Count 10.8 4.0 - 11.0 10e3/uL    RBC Count 2.96 (L) 4.40 - 5.90 10e6/uL    Hemoglobin 9.6 (L) 13.3 - 17.7 g/dL    Hematocrit 29.1 (L) 40.0 - 53.0 %    MCV 98 78 - 100 fL    MCH 32.4 26.5 - 33.0 pg    MCHC 33.0 31.5 - 36.5 g/dL    RDW 13.5 10.0 - 15.0 %    Platelet Count 263 150 - 450 10e3/uL    % Neutrophils 83 %    % Lymphocytes 8 %    % Monocytes 8 %    % Eosinophils 0 %    % Basophils 0 %    % Immature Granulocytes 1 %    NRBCs per 100 WBC 0 <1 /100    Absolute Neutrophils 9.0 (H) 1.6 - 8.3 10e3/uL    Absolute Lymphocytes 0.8 0.8 - 5.3 10e3/uL    Absolute Monocytes 0.9 0.0 - 1.3 10e3/uL    Absolute Eosinophils 0.0 0.0 - 0.7 10e3/uL    Absolute Basophils 0.0 0.0 - 0.2 10e3/uL    Absolute Immature Granulocytes 0.1 <=0.4  10e3/uL    Absolute NRBCs 0.0 10e3/uL   Hemoglobin A1c    Collection Time: 02/01/24 11:10 PM   Result Value Ref Range    Hemoglobin A1C 6.7 (H) <5.7 %   Blood Culture Peripheral Blood    Collection Time: 02/02/24 12:01 AM    Specimen: Peripheral Blood   Result Value Ref Range    Culture No Growth    Asymptomatic Influenza A/B, RSV, & SARS-CoV2 PCR (COVID-19) Nasopharyngeal    Collection Time: 02/02/24 12:13 AM    Specimen: Nasopharyngeal; Swab   Result Value Ref Range    Influenza A PCR Negative Negative    Influenza B PCR Negative Negative    RSV PCR Negative Negative    SARS CoV2 PCR Negative Negative   UA with Microscopic reflex to Culture    Collection Time: 02/02/24  1:41 AM    Specimen: Urine, Catheter   Result Value Ref Range    Color Urine Light Yellow Colorless, Straw, Light Yellow, Yellow    Appearance Urine Clear Clear    Glucose Urine Negative Negative mg/dL    Bilirubin Urine Negative Negative    Ketones Urine Negative Negative mg/dL    Specific Gravity Urine 1.011 1.001 - 1.030    Blood Urine Negative Negative    pH Urine 5.0 5.0 - 7.0    Protein Albumin Urine 10 (A) Negative mg/dL    Urobilinogen Urine <2.0 <2.0 mg/dL    Nitrite Urine Negative Negative    Leukocyte Esterase Urine Negative Negative    Bacteria Urine Few (A) None Seen /HPF    Mucus Urine Present (A) None Seen /LPF    RBC Urine 1 <=2 /HPF    WBC Urine 1 <=5 /HPF    Squamous Epithelials Urine <1 <=1 /HPF    Hyaline Casts Urine 8 (H) <=2 /LPF   Troponin T, High Sensitivity (now)    Collection Time: 02/02/24  2:39 AM   Result Value Ref Range    Troponin T, High Sensitivity 70 (H) <=22 ng/L   TSH    Collection Time: 02/02/24  2:39 AM   Result Value Ref Range    TSH 2.62 0.30 - 4.20 uIU/mL   Basic metabolic panel    Collection Time: 02/02/24  5:40 AM   Result Value Ref Range    Sodium 143 135 - 145 mmol/L    Potassium 4.1 3.4 - 5.3 mmol/L    Chloride 112 (H) 98 - 107 mmol/L    Carbon Dioxide (CO2) 23 22 - 29 mmol/L    Anion Gap 8 7 - 15  mmol/L    Urea Nitrogen 28.9 (H) 8.0 - 23.0 mg/dL    Creatinine 1.57 (H) 0.67 - 1.17 mg/dL    GFR Estimate 45 (L) >60 mL/min/1.73m2    Calcium 8.9 8.8 - 10.2 mg/dL    Glucose 96 70 - 99 mg/dL   CBC with platelets    Collection Time: 02/02/24  5:40 AM   Result Value Ref Range    WBC Count 8.4 4.0 - 11.0 10e3/uL    RBC Count 2.74 (L) 4.40 - 5.90 10e6/uL    Hemoglobin 9.0 (L) 13.3 - 17.7 g/dL    Hematocrit 27.2 (L) 40.0 - 53.0 %    MCV 99 78 - 100 fL    MCH 32.8 26.5 - 33.0 pg    MCHC 33.1 31.5 - 36.5 g/dL    RDW 13.7 10.0 - 15.0 %    Platelet Count 232 150 - 450 10e3/uL   Glucose by meter    Collection Time: 02/02/24  8:30 AM   Result Value Ref Range    GLUCOSE BY METER POCT 104 (H) 70 - 99 mg/dL   Basic metabolic panel    Collection Time: 02/03/24  4:19 AM   Result Value Ref Range    Sodium 141 135 - 145 mmol/L    Potassium 4.0 3.4 - 5.3 mmol/L    Chloride 108 (H) 98 - 107 mmol/L    Carbon Dioxide (CO2) 22 22 - 29 mmol/L    Anion Gap 11 7 - 15 mmol/L    Urea Nitrogen 23.2 (H) 8.0 - 23.0 mg/dL    Creatinine 1.38 (H) 0.67 - 1.17 mg/dL    GFR Estimate 52 (L) >60 mL/min/1.73m2    Calcium 9.5 8.8 - 10.2 mg/dL    Glucose 137 (H) 70 - 99 mg/dL   Glucose by meter    Collection Time: 02/04/24  2:08 PM   Result Value Ref Range    GLUCOSE BY METER POCT 175 (H) 70 - 99 mg/dL   Basic metabolic panel    Collection Time: 02/06/24  6:30 AM   Result Value Ref Range    Sodium 133 (L) 135 - 145 mmol/L    Potassium 5.7 (H) 3.4 - 5.3 mmol/L    Chloride 103 98 - 107 mmol/L    Carbon Dioxide (CO2) 16 (L) 22 - 29 mmol/L    Anion Gap 14 7 - 15 mmol/L    Urea Nitrogen 52.1 (H) 8.0 - 23.0 mg/dL    Creatinine 2.54 (H) 0.67 - 1.17 mg/dL    GFR Estimate 25 (L) >60 mL/min/1.73m2    Calcium 8.4 (L) 8.8 - 10.2 mg/dL    Glucose 132 (H) 70 - 99 mg/dL   CBC with platelets    Collection Time: 02/06/24  6:30 AM   Result Value Ref Range    WBC Count 11.9 (H) 4.0 - 11.0 10e3/uL    RBC Count 2.66 (L) 4.40 - 5.90 10e6/uL    Hemoglobin 8.6 (L) 13.3 - 17.7  g/dL    Hematocrit 26.7 (L) 40.0 - 53.0 %     78 - 100 fL    MCH 32.3 26.5 - 33.0 pg    MCHC 32.2 31.5 - 36.5 g/dL    RDW 14.0 10.0 - 15.0 %    Platelet Count 300 150 - 450 10e3/uL                 Electronically signed by    Lucy Mendez MD

## 2024-02-07 NOTE — LETTER
2/7/2024        RE: Pa García  [address] [facility] [address]        [facility]       Patient Pa García  MRN: [id number]        Reason for Visit     Chief Complaint   Patient presents with     RECHECK     INITIAL       Code Status     CPR/Full code     Assessment/plan       Generalized weakness/recurrent falls  Patient has had aggressive workup done in the hospital including imaging as well as blood work which did not show anything significant.  Blood cultures remain negative.  Discharged to TCU for PT OT  Patient blames it on hip arthritis but does not want to see orthopedics  Discussion done with patient and apparently had a bad outcome from a previous back surgery which resulted in a foot drop and does not want to pursue aggressive outcomes or surgical interventions    Atrial flutter with elevated troponins.  Currently asymptomatic  Patient declined anticoagulation  Advise follow-up with cardiology.  Did discuss this with him and advised to use an Apple Watch to monitor heart rates and follow-up with cardiology as an outpatient to discuss his options  apparently patient is not clear on why he refused anticoagulation.  He also believes that he was told that he does not have a significant atrial flutter.    CKD 3-recheck BMP    Normocytic anemia with a hemoglobin of 9.6  Recheck done today shows a hemoglobin of 8.6.  Monitor trends    Leukocytosis with a white count of 11.9  Recheck in 1 week    Type 2 diabetes with last A1c of 6.7.  Wondering about Dexcom.  Advised and education provided.  He is on oral metformin    Hypertension continue with his amlodipine HCTZ and losartan however medications including losartan and hydrochlorothiazide have been discontinued because of ,hyperkalemia    Hyperkalemia with a recheck potassium of 5.7 taken off HCTZ and valsartan  Hydration recommended.  Kayexalate given today.  Recheck BMP as ordered    Acute ROXIE/CKD  Patient appears to be in acute  renal failure with a jump in BUN to 52 and a creatinine of 2.5 from a baseline of 1.38 at discharge  Gentle hydration taken off hydrochlorothiazide and valsartan  Recheck BMP as ordered for tomorrow    Hyponatremia with a recheck sodium of 133  Taken off HCTZ   will monitor on BMP    B/l trochanteric tecsxaek-jrojyq-nt ortho    Right foot drop patient reports chronic    HLD - simvastatin decreased due to risk of interaction with amlodipine.    Time spent in this visit was 50 minutes including reviewing concerns with the patient as well as labs and follow-up        History     Patient is a very pleasant 78 year old male who is admitted to TCU  Patient admitted post falls which have been recurrent in nature.  He has had multiple falls.  Also seen by cardiology with a new onset flutter however he is rate control and refused any workup he has a history of trochanteric bursitis in his hip and apparently does not any further workup done on that.  Extensive workup done in the hospital was negative currently discharged to the TCU    Past Medical & Surgical History     Past medical history  Hyperlipidemia  Hypertension  Right foot drop  PAST SURGICAL HISTORY:   has a past surgical history that includes back surgery; Lumbar Laminectomy (Bilateral, 3/8/2016); and Lumbar Laminectomy (3/13/2016).      Past Social History     Reviewed,  reports that he has never smoked. He has never used smokeless tobacco. He reports current alcohol use. He reports that he does not use drugs.    Family History     Reviewed, and family history includes Cancer in his maternal grandfather, maternal grandmother, paternal grandfather, and paternal grandmother; Dementia in his father.    Medication List     Current Outpatient Medications   Medication     acetaminophen (TYLENOL) 325 MG tablet     amLODIPine (NORVASC) 2.5 MG tablet     aspirin 81 MG EC tablet     gabapentin (NEURONTIN) 300 MG capsule     lansoprazole (PREVACID) 30 MG capsule      "metFORMIN (GLUCOPHAGE) 1000 MG tablet     polyethylene glycol (MIRALAX) 17 GM/Dose powder     simvastatin (ZOCOR) 20 MG tablet     vit C,D-Oh-nqxsw-lutein-zeaxan (PRESERVISION AREDS-2) 250-90-40-1 mg cap     white petrolatum (AQUAPHOR NATURAL HEALING) 41 % Oint     No current facility-administered medications for this visit.      MED REC REQUIRED  Post Medication Reconciliation Status:  Discharge medications reconciled, continue medications without change       Allergies     Allergies   Allergen Reactions     Ezetimibe-Simvastatin GI Disturbance       Review of Systems   A comprehensive review of 14 systems was done. Pertinent findings noted here and in history of present illness. All the rest negative.  Constitutional: Negative.  Negative for fever, chills, he has  activity change, appetite change and fatigue.   HENT: Negative for congestion and facial swelling.    Eyes: Negative for photophobia, redness and visual disturbance.   Respiratory: Negative for cough and chest tightness.    Cardiovascular: Negative for chest pain, palpitations and leg swelling.   He does not have any concerns with his irregular heart rate and he has never had a racing heart rate  Gastrointestinal: Negative for nausea, diarrhea, constipation, blood in stool and abdominal distention.   Genitourinary: Negative.    Musculoskeletal: Negative.  Reports falls and a chronic right foot drop  Skin: Negative.    Neurological: Negative for dizziness, tremors, syncope, weakness, light-headedness and headaches.   Hematological: Does not bruise/bleed easily.   Psychiatric/Behavioral: Negative.        Physical Exam   /54   Pulse 93   Temp 96.8  F (36  C)   Resp 18   Ht 1.753 m (5' 9\")   Wt 68.5 kg (151 lb)   SpO2 97%   BMI 22.30 kg/m       Constitutional: Oriented to person, place, and time and appears well-developed.   HEENT:  Normocephalic and atraumatic.  Eyes: Conjunctivae and EOM are normal. Pupils are equal, round, and reactive to " light. No discharge.  No scleral icterus. Nose normal. Mouth/Throat: Oropharynx is clear and moist. No oropharyngeal exudate.    NECK: Normal range of motion. Neck supple. No JVD present. No tracheal deviation present. No thyromegaly present.   CARDIOVASCULAR: Normal rate, irregular rhythm and intact distal pulses.  Exam reveals no gallop and no friction rub.  Systolic murmur present.  PULMONARY: Effort normal and breath sounds normal. No respiratory distress.No Wheezing or rales.  ABDOMEN: Soft. Bowel sounds are normal. No distension and no mass.  There is no tenderness. There is no rebound and no guarding. No HSM.  MUSCULOSKELETAL: Normal range of motion. Mild kyphosis, no tenderness.  Rt foot drop  LYMPH NODES: Has no cervical, supraclavicular, axillary and groin adenopathy.   NEUROLOGICAL: Alert and oriented to person, place, and time. No cranial nerve deficit.  Normal muscle tone. Coordination normal.   Right chronic foot drop noted  GENITOURINARY: Deferred exam.  SKIN: Skin is warm and dry. No rash noted. No erythema. No pallor.   EXTREMITIES: No cyanosis, no clubbing, no edema. No Deformity.  PSYCHIATRIC: Normal mood, affect and behavior.      Lab Results     Recent Results (from the past 240 hour(s))   ECG 12-LEAD WITH MUSE (E)    Collection Time: 02/01/24 11:08 PM   Result Value Ref Range    Systolic Blood Pressure  mmHg    Diastolic Blood Pressure  mmHg    Ventricular Rate 95 BPM    Atrial Rate 95 BPM    WI Interval  ms    QRS Duration 92 ms     ms    QTc 439 ms    P Axis  degrees    R AXIS 28 degrees    T Axis 25 degrees    Interpretation ECG       Sinus rhythm with A-V dissociation and Accelerated Junctional rhythm with occasional Premature ventricular complexes  Incomplete right bundle branch block  Septal infarct , age undetermined  Abnormal ECG  When compared with ECG of 08-JUN-2021 13:20,  Junctional rhythm has replaced Sinus rhythm  Incomplete right bundle branch block is now Present  Septal  infarct is now Present     Comprehensive metabolic panel    Collection Time: 02/01/24 11:10 PM   Result Value Ref Range    Sodium 143 135 - 145 mmol/L    Potassium 4.6 3.4 - 5.3 mmol/L    Carbon Dioxide (CO2) 22 22 - 29 mmol/L    Anion Gap 11 7 - 15 mmol/L    Urea Nitrogen 31.1 (H) 8.0 - 23.0 mg/dL    Creatinine 1.76 (H) 0.67 - 1.17 mg/dL    GFR Estimate 39 (L) >60 mL/min/1.73m2    Calcium 9.6 8.8 - 10.2 mg/dL    Chloride 110 (H) 98 - 107 mmol/L    Glucose 107 (H) 70 - 99 mg/dL    Alkaline Phosphatase 70 40 - 150 U/L    AST 20 0 - 45 U/L    ALT 10 0 - 70 U/L    Protein Total 6.8 6.4 - 8.3 g/dL    Albumin 4.2 3.5 - 5.2 g/dL    Bilirubin Total 0.4 <=1.2 mg/dL   Troponin T, High Sensitivity (now)    Collection Time: 02/01/24 11:10 PM   Result Value Ref Range    Troponin T, High Sensitivity 85 (H) <=22 ng/L   Procalcitonin    Collection Time: 02/01/24 11:10 PM   Result Value Ref Range    Procalcitonin 0.29 <0.50 ng/mL   Lactic acid whole blood    Collection Time: 02/01/24 11:10 PM   Result Value Ref Range    Lactic Acid 1.5 0.7 - 2.0 mmol/L   Blood Culture Peripheral Blood    Collection Time: 02/01/24 11:10 PM    Specimen: Peripheral Blood   Result Value Ref Range    Culture No Growth    CBC with platelets and differential    Collection Time: 02/01/24 11:10 PM   Result Value Ref Range    WBC Count 10.8 4.0 - 11.0 10e3/uL    RBC Count 2.96 (L) 4.40 - 5.90 10e6/uL    Hemoglobin 9.6 (L) 13.3 - 17.7 g/dL    Hematocrit 29.1 (L) 40.0 - 53.0 %    MCV 98 78 - 100 fL    MCH 32.4 26.5 - 33.0 pg    MCHC 33.0 31.5 - 36.5 g/dL    RDW 13.5 10.0 - 15.0 %    Platelet Count 263 150 - 450 10e3/uL    % Neutrophils 83 %    % Lymphocytes 8 %    % Monocytes 8 %    % Eosinophils 0 %    % Basophils 0 %    % Immature Granulocytes 1 %    NRBCs per 100 WBC 0 <1 /100    Absolute Neutrophils 9.0 (H) 1.6 - 8.3 10e3/uL    Absolute Lymphocytes 0.8 0.8 - 5.3 10e3/uL    Absolute Monocytes 0.9 0.0 - 1.3 10e3/uL    Absolute Eosinophils 0.0 0.0 - 0.7  10e3/uL    Absolute Basophils 0.0 0.0 - 0.2 10e3/uL    Absolute Immature Granulocytes 0.1 <=0.4 10e3/uL    Absolute NRBCs 0.0 10e3/uL   Hemoglobin A1c    Collection Time: 02/01/24 11:10 PM   Result Value Ref Range    Hemoglobin A1C 6.7 (H) <5.7 %   Blood Culture Peripheral Blood    Collection Time: 02/02/24 12:01 AM    Specimen: Peripheral Blood   Result Value Ref Range    Culture No Growth    Asymptomatic Influenza A/B, RSV, & SARS-CoV2 PCR (COVID-19) Nasopharyngeal    Collection Time: 02/02/24 12:13 AM    Specimen: Nasopharyngeal; Swab   Result Value Ref Range    Influenza A PCR Negative Negative    Influenza B PCR Negative Negative    RSV PCR Negative Negative    SARS CoV2 PCR Negative Negative   UA with Microscopic reflex to Culture    Collection Time: 02/02/24  1:41 AM    Specimen: Urine, Catheter   Result Value Ref Range    Color Urine Light Yellow Colorless, Straw, Light Yellow, Yellow    Appearance Urine Clear Clear    Glucose Urine Negative Negative mg/dL    Bilirubin Urine Negative Negative    Ketones Urine Negative Negative mg/dL    Specific Gravity Urine 1.011 1.001 - 1.030    Blood Urine Negative Negative    pH Urine 5.0 5.0 - 7.0    Protein Albumin Urine 10 (A) Negative mg/dL    Urobilinogen Urine <2.0 <2.0 mg/dL    Nitrite Urine Negative Negative    Leukocyte Esterase Urine Negative Negative    Bacteria Urine Few (A) None Seen /HPF    Mucus Urine Present (A) None Seen /LPF    RBC Urine 1 <=2 /HPF    WBC Urine 1 <=5 /HPF    Squamous Epithelials Urine <1 <=1 /HPF    Hyaline Casts Urine 8 (H) <=2 /LPF   Troponin T, High Sensitivity (now)    Collection Time: 02/02/24  2:39 AM   Result Value Ref Range    Troponin T, High Sensitivity 70 (H) <=22 ng/L   TSH    Collection Time: 02/02/24  2:39 AM   Result Value Ref Range    TSH 2.62 0.30 - 4.20 uIU/mL   Basic metabolic panel    Collection Time: 02/02/24  5:40 AM   Result Value Ref Range    Sodium 143 135 - 145 mmol/L    Potassium 4.1 3.4 - 5.3 mmol/L     Chloride 112 (H) 98 - 107 mmol/L    Carbon Dioxide (CO2) 23 22 - 29 mmol/L    Anion Gap 8 7 - 15 mmol/L    Urea Nitrogen 28.9 (H) 8.0 - 23.0 mg/dL    Creatinine 1.57 (H) 0.67 - 1.17 mg/dL    GFR Estimate 45 (L) >60 mL/min/1.73m2    Calcium 8.9 8.8 - 10.2 mg/dL    Glucose 96 70 - 99 mg/dL   CBC with platelets    Collection Time: 02/02/24  5:40 AM   Result Value Ref Range    WBC Count 8.4 4.0 - 11.0 10e3/uL    RBC Count 2.74 (L) 4.40 - 5.90 10e6/uL    Hemoglobin 9.0 (L) 13.3 - 17.7 g/dL    Hematocrit 27.2 (L) 40.0 - 53.0 %    MCV 99 78 - 100 fL    MCH 32.8 26.5 - 33.0 pg    MCHC 33.1 31.5 - 36.5 g/dL    RDW 13.7 10.0 - 15.0 %    Platelet Count 232 150 - 450 10e3/uL   Glucose by meter    Collection Time: 02/02/24  8:30 AM   Result Value Ref Range    GLUCOSE BY METER POCT 104 (H) 70 - 99 mg/dL   Basic metabolic panel    Collection Time: 02/03/24  4:19 AM   Result Value Ref Range    Sodium 141 135 - 145 mmol/L    Potassium 4.0 3.4 - 5.3 mmol/L    Chloride 108 (H) 98 - 107 mmol/L    Carbon Dioxide (CO2) 22 22 - 29 mmol/L    Anion Gap 11 7 - 15 mmol/L    Urea Nitrogen 23.2 (H) 8.0 - 23.0 mg/dL    Creatinine 1.38 (H) 0.67 - 1.17 mg/dL    GFR Estimate 52 (L) >60 mL/min/1.73m2    Calcium 9.5 8.8 - 10.2 mg/dL    Glucose 137 (H) 70 - 99 mg/dL   Glucose by meter    Collection Time: 02/04/24  2:08 PM   Result Value Ref Range    GLUCOSE BY METER POCT 175 (H) 70 - 99 mg/dL   Basic metabolic panel    Collection Time: 02/06/24  6:30 AM   Result Value Ref Range    Sodium 133 (L) 135 - 145 mmol/L    Potassium 5.7 (H) 3.4 - 5.3 mmol/L    Chloride 103 98 - 107 mmol/L    Carbon Dioxide (CO2) 16 (L) 22 - 29 mmol/L    Anion Gap 14 7 - 15 mmol/L    Urea Nitrogen 52.1 (H) 8.0 - 23.0 mg/dL    Creatinine 2.54 (H) 0.67 - 1.17 mg/dL    GFR Estimate 25 (L) >60 mL/min/1.73m2    Calcium 8.4 (L) 8.8 - 10.2 mg/dL    Glucose 132 (H) 70 - 99 mg/dL   CBC with platelets    Collection Time: 02/06/24  6:30 AM   Result Value Ref Range    WBC Count 11.9 (H)  4.0 - 11.0 10e3/uL    RBC Count 2.66 (L) 4.40 - 5.90 10e6/uL    Hemoglobin 8.6 (L) 13.3 - 17.7 g/dL    Hematocrit 26.7 (L) 40.0 - 53.0 %     78 - 100 fL    MCH 32.3 26.5 - 33.0 pg    MCHC 32.2 31.5 - 36.5 g/dL    RDW 14.0 10.0 - 15.0 %    Platelet Count 300 150 - 450 10e3/uL                 Electronically signed by    Lucy Mendez MD                            Sincerely,        JEREMI Mills

## 2024-02-07 NOTE — TELEPHONE ENCOUNTER
[facility] Geriatrics Lab Note     Provider: ELLY Ernst  Facility: [facility]  Facility Type:  TCU    Allergies   Allergen Reactions    Ezetimibe-Simvastatin GI Disturbance       Labs Reviewed by provider: MARCIA---from 2/6/24    Verbal Order/Direction given by Provider: Discontinue Hydrochlorothiazide.  Discontinue Valsartan.  Give Kayexalate 15g x 1 dose today.  Check BP Q shift.  Encourage gentle hydration.  Check BMP on 2/8/24.      Provider giving Order:  ELLY Ernst    Verbal Order given to: Karla([phone])    Jay Jay Koehler RN

## 2024-02-08 ENCOUNTER — TELEPHONE (OUTPATIENT)
Dept: GERIATRICS | Facility: CLINIC | Age: 79
End: 2024-02-08

## 2024-02-08 LAB
ANION GAP SERPL CALCULATED.3IONS-SCNC: 16 MMOL/L (ref 7–15)
BUN SERPL-MCNC: 70.3 MG/DL (ref 8–23)
CALCIUM SERPL-MCNC: 8.4 MG/DL (ref 8.8–10.2)
CHLORIDE SERPL-SCNC: 99 MMOL/L (ref 98–107)
CREAT SERPL-MCNC: 4.08 MG/DL (ref 0.67–1.17)
DEPRECATED HCO3 PLAS-SCNC: 16 MMOL/L (ref 22–29)
EGFRCR SERPLBLD CKD-EPI 2021: 14 ML/MIN/1.73M2
GLUCOSE SERPL-MCNC: 100 MG/DL (ref 70–99)
POTASSIUM SERPL-SCNC: 5.7 MMOL/L (ref 3.4–5.3)
SODIUM SERPL-SCNC: 131 MMOL/L (ref 135–145)

## 2024-02-08 PROCEDURE — P9603 ONE-WAY ALLOW PRORATED MILES: HCPCS | Mod: ORL | Performed by: NURSE PRACTITIONER

## 2024-02-08 PROCEDURE — 36415 COLL VENOUS BLD VENIPUNCTURE: CPT | Mod: ORL | Performed by: NURSE PRACTITIONER

## 2024-02-08 PROCEDURE — 80048 BASIC METABOLIC PNL TOTAL CA: CPT | Mod: ORL | Performed by: NURSE PRACTITIONER

## 2024-02-09 ENCOUNTER — HOSPITAL ENCOUNTER (INPATIENT)
Facility: CLINIC | Age: 79
LOS: 5 days | Discharge: NURSING, REHAB OR LONG-TERM CARE | DRG: 682 | End: 2024-02-14
Attending: EMERGENCY MEDICINE | Admitting: FAMILY MEDICINE
Payer: COMMERCIAL

## 2024-02-09 ENCOUNTER — LAB REQUISITION (OUTPATIENT)
Dept: LAB | Facility: CLINIC | Age: 79
End: 2024-02-09
Payer: COMMERCIAL

## 2024-02-09 ENCOUNTER — APPOINTMENT (OUTPATIENT)
Dept: RADIOLOGY | Facility: CLINIC | Age: 79
DRG: 682 | End: 2024-02-09
Attending: EMERGENCY MEDICINE
Payer: COMMERCIAL

## 2024-02-09 ENCOUNTER — APPOINTMENT (OUTPATIENT)
Dept: CT IMAGING | Facility: CLINIC | Age: 79
DRG: 682 | End: 2024-02-09
Attending: EMERGENCY MEDICINE
Payer: COMMERCIAL

## 2024-02-09 ENCOUNTER — TRANSITIONAL CARE UNIT VISIT (OUTPATIENT)
Dept: GERIATRICS | Facility: CLINIC | Age: 79
End: 2024-02-09
Payer: COMMERCIAL

## 2024-02-09 VITALS
RESPIRATION RATE: 18 BRPM | HEIGHT: 69 IN | TEMPERATURE: 97.8 F | WEIGHT: 152 LBS | SYSTOLIC BLOOD PRESSURE: 165 MMHG | OXYGEN SATURATION: 92 % | BODY MASS INDEX: 22.51 KG/M2 | DIASTOLIC BLOOD PRESSURE: 66 MMHG | HEART RATE: 98 BPM

## 2024-02-09 DIAGNOSIS — D72.829 LEUKOCYTOSIS, UNSPECIFIED TYPE: ICD-10-CM

## 2024-02-09 DIAGNOSIS — I10 ACCELERATED HYPERTENSION: ICD-10-CM

## 2024-02-09 DIAGNOSIS — I50.9 HEART FAILURE, UNSPECIFIED (H): ICD-10-CM

## 2024-02-09 DIAGNOSIS — E87.5 HYPERKALEMIA: ICD-10-CM

## 2024-02-09 DIAGNOSIS — R11.2 NAUSEA AND VOMITING, UNSPECIFIED VOMITING TYPE: ICD-10-CM

## 2024-02-09 DIAGNOSIS — E87.29 HIGH ANION GAP METABOLIC ACIDOSIS: ICD-10-CM

## 2024-02-09 DIAGNOSIS — R23.9 SKIN CHANGE: Primary | ICD-10-CM

## 2024-02-09 DIAGNOSIS — N30.00 ACUTE CYSTITIS WITHOUT HEMATURIA: ICD-10-CM

## 2024-02-09 DIAGNOSIS — N18.9 CHRONIC KIDNEY DISEASE, UNSPECIFIED: ICD-10-CM

## 2024-02-09 DIAGNOSIS — N17.9 ACUTE RENAL FAILURE SUPERIMPOSED ON CHRONIC KIDNEY DISEASE, UNSPECIFIED ACUTE RENAL FAILURE TYPE, UNSPECIFIED CKD STAGE (H): ICD-10-CM

## 2024-02-09 DIAGNOSIS — N18.9 ACUTE RENAL FAILURE SUPERIMPOSED ON CHRONIC KIDNEY DISEASE, UNSPECIFIED ACUTE RENAL FAILURE TYPE, UNSPECIFIED CKD STAGE (H): ICD-10-CM

## 2024-02-09 DIAGNOSIS — E87.1 HYPONATREMIA: ICD-10-CM

## 2024-02-09 DIAGNOSIS — R79.89 ELEVATED SERUM CREATININE: Primary | ICD-10-CM

## 2024-02-09 DIAGNOSIS — Z51.81 ENCOUNTER FOR THERAPEUTIC DRUG LEVEL MONITORING: ICD-10-CM

## 2024-02-09 DIAGNOSIS — E87.20 LACTIC ACIDOSIS: ICD-10-CM

## 2024-02-09 DIAGNOSIS — N17.9 AKI (ACUTE KIDNEY INJURY) (H): ICD-10-CM

## 2024-02-09 PROBLEM — N28.9 ACUTE RENAL INSUFFICIENCY: Status: ACTIVE | Noted: 2024-02-09

## 2024-02-09 LAB
ACANTHOCYTES BLD QL SMEAR: SLIGHT
ALBUMIN MFR UR ELPH: 305 MG/DL
ALBUMIN SERPL BCG-MCNC: 3.7 G/DL (ref 3.5–5.2)
ALBUMIN UR-MCNC: 300 MG/DL
ALP SERPL-CCNC: 61 U/L (ref 40–150)
ALT SERPL W P-5'-P-CCNC: 9 U/L (ref 0–70)
ANION GAP SERPL CALCULATED.3IONS-SCNC: 25 MMOL/L (ref 7–15)
APPEARANCE UR: ABNORMAL
AST SERPL W P-5'-P-CCNC: 16 U/L (ref 0–45)
ATRIAL RATE - MUSE: 86 BPM
AUER BODIES BLD QL SMEAR: ABNORMAL
BACTERIA #/AREA URNS HPF: ABNORMAL /HPF
BASE EXCESS BLDV CALC-SCNC: -21.4 MMOL/L (ref -3–3)
BASO STIPL BLD QL SMEAR: ABNORMAL
BASOPHILS # BLD AUTO: 0.1 10E3/UL (ref 0–0.2)
BASOPHILS NFR BLD AUTO: 0 %
BILIRUB DIRECT SERPL-MCNC: <0.2 MG/DL (ref 0–0.3)
BILIRUB SERPL-MCNC: 0.3 MG/DL
BILIRUB UR QL STRIP: NEGATIVE
BITE CELLS BLD QL SMEAR: ABNORMAL
BLISTER CELLS BLD QL SMEAR: ABNORMAL
BUN SERPL-MCNC: 77.8 MG/DL (ref 8–23)
BURR CELLS BLD QL SMEAR: ABNORMAL
CALCIUM SERPL-MCNC: 8.6 MG/DL (ref 8.8–10.2)
CHLORIDE SERPL-SCNC: 92 MMOL/L (ref 98–107)
CK SERPL-CCNC: 72 U/L (ref 39–308)
COLOR UR AUTO: YELLOW
CREAT SERPL-MCNC: 5.15 MG/DL (ref 0.67–1.17)
CREAT UR-MCNC: 36.3 MG/DL
DACRYOCYTES BLD QL SMEAR: ABNORMAL
DEPRECATED HCO3 PLAS-SCNC: 10 MMOL/L (ref 22–29)
DIASTOLIC BLOOD PRESSURE - MUSE: 72 MMHG
EGFRCR SERPLBLD CKD-EPI 2021: 11 ML/MIN/1.73M2
ELLIPTOCYTES BLD QL SMEAR: ABNORMAL
EOSINOPHIL # BLD AUTO: 0 10E3/UL (ref 0–0.7)
EOSINOPHIL NFR BLD AUTO: 0 %
ERYTHROCYTE [DISTWIDTH] IN BLOOD BY AUTOMATED COUNT: 13.3 % (ref 10–15)
FRAGMENTS BLD QL SMEAR: ABNORMAL
GLUCOSE BLDC GLUCOMTR-MCNC: 112 MG/DL (ref 70–99)
GLUCOSE BLDC GLUCOMTR-MCNC: 119 MG/DL (ref 70–99)
GLUCOSE BLDC GLUCOMTR-MCNC: 172 MG/DL (ref 70–99)
GLUCOSE BLDC GLUCOMTR-MCNC: 178 MG/DL (ref 70–99)
GLUCOSE BLDC GLUCOMTR-MCNC: 183 MG/DL (ref 70–99)
GLUCOSE BLDC GLUCOMTR-MCNC: 186 MG/DL (ref 70–99)
GLUCOSE BLDC GLUCOMTR-MCNC: 191 MG/DL (ref 70–99)
GLUCOSE BLDC GLUCOMTR-MCNC: 192 MG/DL (ref 70–99)
GLUCOSE BLDC GLUCOMTR-MCNC: 193 MG/DL (ref 70–99)
GLUCOSE SERPL-MCNC: 83 MG/DL (ref 70–99)
GLUCOSE UR STRIP-MCNC: NEGATIVE MG/DL
HCO3 BLDV-SCNC: 10 MMOL/L (ref 21–28)
HCT VFR BLD AUTO: 32.3 % (ref 40–53)
HGB BLD-MCNC: 10.4 G/DL (ref 13.3–17.7)
HGB C CRYSTALS: ABNORMAL
HGB UR QL STRIP: ABNORMAL
HOWELL-JOLLY BOD BLD QL SMEAR: ABNORMAL
IMM GRANULOCYTES # BLD: 0.4 10E3/UL
IMM GRANULOCYTES NFR BLD: 2 %
INTERPRETATION ECG - MUSE: NORMAL
KETONES UR STRIP-MCNC: NEGATIVE MG/DL
LACTATE SERPL-SCNC: 5.8 MMOL/L (ref 0.7–2)
LACTATE SERPL-SCNC: 6.1 MMOL/L (ref 0.7–2)
LACTATE SERPL-SCNC: 6.4 MMOL/L (ref 0.7–2)
LEUKOCYTE ESTERASE UR QL STRIP: ABNORMAL
LIPASE SERPL-CCNC: 9 U/L (ref 13–60)
LYMPHOCYTES # BLD AUTO: 1.1 10E3/UL (ref 0.8–5.3)
LYMPHOCYTES NFR BLD AUTO: 5 %
MCH RBC QN AUTO: 32.6 PG (ref 26.5–33)
MCHC RBC AUTO-ENTMCNC: 32.2 G/DL (ref 31.5–36.5)
MCV RBC AUTO: 101 FL (ref 78–100)
MONOCYTES # BLD AUTO: 1.8 10E3/UL (ref 0–1.3)
MONOCYTES NFR BLD AUTO: 8 %
MUCOUS THREADS #/AREA URNS LPF: PRESENT /LPF
NEUTROPHILS # BLD AUTO: 19.7 10E3/UL (ref 1.6–8.3)
NEUTROPHILS NFR BLD AUTO: 85 %
NEUTS HYPERSEG BLD QL SMEAR: ABNORMAL
NITRATE UR QL: NEGATIVE
NRBC # BLD AUTO: 0 10E3/UL
NRBC BLD AUTO-RTO: 0 /100
O2/TOTAL GAS SETTING VFR VENT: 21 %
OXYHGB MFR BLDV: 74 % (ref 70–75)
P AXIS - MUSE: 84 DEGREES
PCO2 BLDV: 37 MM HG (ref 40–50)
PH BLDV: 7.03 [PH] (ref 7.32–7.43)
PH UR STRIP: 6.5 [PH] (ref 5–7)
PLAT MORPH BLD: ABNORMAL
PLATELET # BLD AUTO: 412 10E3/UL (ref 150–450)
PO2 BLDV: 53 MM HG (ref 25–47)
POLYCHROMASIA BLD QL SMEAR: ABNORMAL
POTASSIUM SERPL-SCNC: 5.1 MMOL/L (ref 3.4–5.3)
POTASSIUM SERPL-SCNC: 5.2 MMOL/L (ref 3.4–5.3)
POTASSIUM SERPL-SCNC: 6.3 MMOL/L (ref 3.4–5.3)
PR INTERVAL - MUSE: 124 MS
PROT SERPL-MCNC: 6.7 G/DL (ref 6.4–8.3)
PROT/CREAT 24H UR: 8.4 MG/MG CR (ref 0–0.2)
QRS DURATION - MUSE: 122 MS
QT - MUSE: 368 MS
QTC - MUSE: 440 MS
R AXIS - MUSE: -42 DEGREES
RBC # BLD AUTO: 3.19 10E6/UL (ref 4.4–5.9)
RBC AGGLUT BLD QL: ABNORMAL
RBC MORPH BLD: ABNORMAL
RBC URINE: 89 /HPF
ROULEAUX BLD QL SMEAR: ABNORMAL
SAO2 % BLDV: 74.3 % (ref 70–75)
SICKLE CELLS BLD QL SMEAR: ABNORMAL
SMUDGE CELLS BLD QL SMEAR: ABNORMAL
SODIUM SERPL-SCNC: 127 MMOL/L (ref 135–145)
SP GR UR STRIP: 1.01 (ref 1–1.03)
SPHEROCYTES BLD QL SMEAR: ABNORMAL
STOMATOCYTES BLD QL SMEAR: ABNORMAL
SYSTOLIC BLOOD PRESSURE - MUSE: 146 MMHG
T AXIS - MUSE: 81 DEGREES
TARGETS BLD QL SMEAR: ABNORMAL
TOXIC GRANULES BLD QL SMEAR: ABNORMAL
UROBILINOGEN UR STRIP-MCNC: <2 MG/DL
VARIANT LYMPHS BLD QL SMEAR: ABNORMAL
VENTRICULAR RATE- MUSE: 86 BPM
WBC # BLD AUTO: 23.1 10E3/UL (ref 4–11)
WBC CLUMPS #/AREA URNS HPF: PRESENT /HPF
WBC URINE: >182 /HPF

## 2024-02-09 PROCEDURE — 250N000013 HC RX MED GY IP 250 OP 250 PS 637: Performed by: FAMILY MEDICINE

## 2024-02-09 PROCEDURE — 93005 ELECTROCARDIOGRAM TRACING: CPT | Performed by: EMERGENCY MEDICINE

## 2024-02-09 PROCEDURE — 84132 ASSAY OF SERUM POTASSIUM: CPT | Performed by: EMERGENCY MEDICINE

## 2024-02-09 PROCEDURE — 99223 1ST HOSP IP/OBS HIGH 75: CPT | Performed by: INTERNAL MEDICINE

## 2024-02-09 PROCEDURE — 200N000001 HC R&B ICU

## 2024-02-09 PROCEDURE — 85025 COMPLETE CBC W/AUTO DIFF WBC: CPT | Performed by: EMERGENCY MEDICINE

## 2024-02-09 PROCEDURE — 96365 THER/PROPH/DIAG IV INF INIT: CPT

## 2024-02-09 PROCEDURE — 80076 HEPATIC FUNCTION PANEL: CPT | Performed by: EMERGENCY MEDICINE

## 2024-02-09 PROCEDURE — 250N000009 HC RX 250: Performed by: EMERGENCY MEDICINE

## 2024-02-09 PROCEDURE — 258N000001 HC RX 258: Performed by: EMERGENCY MEDICINE

## 2024-02-09 PROCEDURE — 86160 COMPLEMENT ANTIGEN: CPT | Performed by: INTERNAL MEDICINE

## 2024-02-09 PROCEDURE — 83605 ASSAY OF LACTIC ACID: CPT

## 2024-02-09 PROCEDURE — 82550 ASSAY OF CK (CPK): CPT | Performed by: EMERGENCY MEDICINE

## 2024-02-09 PROCEDURE — 99223 1ST HOSP IP/OBS HIGH 75: CPT | Mod: AI

## 2024-02-09 PROCEDURE — 250N000011 HC RX IP 250 OP 636: Performed by: FAMILY MEDICINE

## 2024-02-09 PROCEDURE — 81001 URINALYSIS AUTO W/SCOPE: CPT | Performed by: EMERGENCY MEDICINE

## 2024-02-09 PROCEDURE — 86038 ANTINUCLEAR ANTIBODIES: CPT | Performed by: INTERNAL MEDICINE

## 2024-02-09 PROCEDURE — 5A1D70Z PERFORMANCE OF URINARY FILTRATION, INTERMITTENT, LESS THAN 6 HOURS PER DAY: ICD-10-PCS | Performed by: INTERNAL MEDICINE

## 2024-02-09 PROCEDURE — 250N000013 HC RX MED GY IP 250 OP 250 PS 637

## 2024-02-09 PROCEDURE — 87086 URINE CULTURE/COLONY COUNT: CPT | Performed by: EMERGENCY MEDICINE

## 2024-02-09 PROCEDURE — 36415 COLL VENOUS BLD VENIPUNCTURE: CPT

## 2024-02-09 PROCEDURE — 96361 HYDRATE IV INFUSION ADD-ON: CPT

## 2024-02-09 PROCEDURE — 87186 SC STD MICRODIL/AGAR DIL: CPT | Performed by: EMERGENCY MEDICINE

## 2024-02-09 PROCEDURE — 83690 ASSAY OF LIPASE: CPT | Performed by: EMERGENCY MEDICINE

## 2024-02-09 PROCEDURE — 258N000003 HC RX IP 258 OP 636: Performed by: EMERGENCY MEDICINE

## 2024-02-09 PROCEDURE — 99291 CRITICAL CARE FIRST HOUR: CPT | Mod: 25

## 2024-02-09 PROCEDURE — 82962 GLUCOSE BLOOD TEST: CPT

## 2024-02-09 PROCEDURE — 36415 COLL VENOUS BLD VENIPUNCTURE: CPT | Performed by: EMERGENCY MEDICINE

## 2024-02-09 PROCEDURE — 258N000003 HC RX IP 258 OP 636: Performed by: INTERNAL MEDICINE

## 2024-02-09 PROCEDURE — 96376 TX/PRO/DX INJ SAME DRUG ADON: CPT

## 2024-02-09 PROCEDURE — 999N000065 XR CHEST PORT 1 VIEW

## 2024-02-09 PROCEDURE — 82805 BLOOD GASES W/O2 SATURATION: CPT | Performed by: EMERGENCY MEDICINE

## 2024-02-09 PROCEDURE — 250N000011 HC RX IP 250 OP 636: Performed by: INTERNAL MEDICINE

## 2024-02-09 PROCEDURE — 90935 HEMODIALYSIS ONE EVALUATION: CPT

## 2024-02-09 PROCEDURE — 99310 SBSQ NF CARE HIGH MDM 45: CPT | Performed by: NURSE PRACTITIONER

## 2024-02-09 PROCEDURE — 36415 COLL VENOUS BLD VENIPUNCTURE: CPT | Performed by: INTERNAL MEDICINE

## 2024-02-09 PROCEDURE — 250N000011 HC RX IP 250 OP 636: Performed by: EMERGENCY MEDICINE

## 2024-02-09 PROCEDURE — 87040 BLOOD CULTURE FOR BACTERIA: CPT | Performed by: EMERGENCY MEDICINE

## 2024-02-09 PROCEDURE — 84156 ASSAY OF PROTEIN URINE: CPT | Performed by: INTERNAL MEDICINE

## 2024-02-09 PROCEDURE — 83605 ASSAY OF LACTIC ACID: CPT | Performed by: EMERGENCY MEDICINE

## 2024-02-09 PROCEDURE — 250N000012 HC RX MED GY IP 250 OP 636 PS 637: Performed by: EMERGENCY MEDICINE

## 2024-02-09 PROCEDURE — 96375 TX/PRO/DX INJ NEW DRUG ADDON: CPT

## 2024-02-09 PROCEDURE — 74176 CT ABD & PELVIS W/O CONTRAST: CPT

## 2024-02-09 PROCEDURE — 86037 ANCA TITER EACH ANTIBODY: CPT | Performed by: INTERNAL MEDICINE

## 2024-02-09 RX ORDER — GABAPENTIN 300 MG/1
900 CAPSULE ORAL 2 TIMES DAILY
Status: ON HOLD | COMMUNITY
End: 2024-02-14

## 2024-02-09 RX ORDER — OMEGA-3 FATTY ACIDS/FISH OIL 300-1000MG
CAPSULE ORAL
Status: ON HOLD | COMMUNITY
End: 2024-02-09

## 2024-02-09 RX ORDER — DEXTROSE MONOHYDRATE 25 G/50ML
25-50 INJECTION, SOLUTION INTRAVENOUS
Status: DISCONTINUED | OUTPATIENT
Start: 2024-02-09 | End: 2024-02-14 | Stop reason: HOSPADM

## 2024-02-09 RX ORDER — ALBUMIN (HUMAN) 12.5 G/50ML
50 SOLUTION INTRAVENOUS
Status: DISCONTINUED | OUTPATIENT
Start: 2024-02-09 | End: 2024-02-10

## 2024-02-09 RX ORDER — CALCIUM GLUCONATE 94 MG/ML
1 INJECTION, SOLUTION INTRAVENOUS ONCE
Status: COMPLETED | OUTPATIENT
Start: 2024-02-09 | End: 2024-02-09

## 2024-02-09 RX ORDER — ACETAMINOPHEN 325 MG/1
650 TABLET ORAL EVERY 4 HOURS PRN
Status: DISCONTINUED | OUTPATIENT
Start: 2024-02-09 | End: 2024-02-14 | Stop reason: HOSPADM

## 2024-02-09 RX ORDER — LIDOCAINE HYDROCHLORIDE 20 MG/ML
10 JELLY TOPICAL ONCE
Status: COMPLETED | OUTPATIENT
Start: 2024-02-09 | End: 2024-02-09

## 2024-02-09 RX ORDER — NICOTINE POLACRILEX 4 MG
15-30 LOZENGE BUCCAL
Status: DISCONTINUED | OUTPATIENT
Start: 2024-02-09 | End: 2024-02-14 | Stop reason: HOSPADM

## 2024-02-09 RX ORDER — ACETAMINOPHEN 650 MG/1
650 SUPPOSITORY RECTAL EVERY 4 HOURS PRN
Status: DISCONTINUED | OUTPATIENT
Start: 2024-02-09 | End: 2024-02-14 | Stop reason: HOSPADM

## 2024-02-09 RX ORDER — GABAPENTIN 100 MG/1
100 CAPSULE ORAL 3 TIMES DAILY
Status: DISCONTINUED | OUTPATIENT
Start: 2024-02-09 | End: 2024-02-14 | Stop reason: HOSPADM

## 2024-02-09 RX ORDER — PIPERACILLIN SODIUM, TAZOBACTAM SODIUM 3; .375 G/15ML; G/15ML
3.38 INJECTION, POWDER, LYOPHILIZED, FOR SOLUTION INTRAVENOUS EVERY 12 HOURS
Status: DISCONTINUED | OUTPATIENT
Start: 2024-02-09 | End: 2024-02-11

## 2024-02-09 RX ORDER — GABAPENTIN 300 MG/1
900 CAPSULE ORAL 2 TIMES DAILY
Status: DISCONTINUED | OUTPATIENT
Start: 2024-02-09 | End: 2024-02-09

## 2024-02-09 RX ORDER — DEXTROSE MONOHYDRATE 25 G/50ML
25 INJECTION, SOLUTION INTRAVENOUS ONCE
Status: COMPLETED | OUTPATIENT
Start: 2024-02-09 | End: 2024-02-09

## 2024-02-09 RX ORDER — VALSARTAN 160 MG/1
160 TABLET ORAL DAILY
Status: ON HOLD | COMMUNITY
End: 2024-02-09

## 2024-02-09 RX ORDER — LIDOCAINE 40 MG/G
CREAM TOPICAL
Status: DISCONTINUED | OUTPATIENT
Start: 2024-02-09 | End: 2024-02-14 | Stop reason: HOSPADM

## 2024-02-09 RX ORDER — CALCIUM CARBONATE 500 MG/1
1000 TABLET, CHEWABLE ORAL 4 TIMES DAILY PRN
Status: DISCONTINUED | OUTPATIENT
Start: 2024-02-09 | End: 2024-02-14 | Stop reason: HOSPADM

## 2024-02-09 RX ORDER — AMOXICILLIN 250 MG
1 CAPSULE ORAL 2 TIMES DAILY PRN
Status: DISCONTINUED | OUTPATIENT
Start: 2024-02-09 | End: 2024-02-14 | Stop reason: HOSPADM

## 2024-02-09 RX ORDER — PROCHLORPERAZINE 25 MG
12.5 SUPPOSITORY, RECTAL RECTAL EVERY 12 HOURS PRN
Status: DISCONTINUED | OUTPATIENT
Start: 2024-02-09 | End: 2024-02-14 | Stop reason: HOSPADM

## 2024-02-09 RX ORDER — SIMVASTATIN 20 MG
20 TABLET ORAL AT BEDTIME
Status: DISCONTINUED | OUTPATIENT
Start: 2024-02-09 | End: 2024-02-14 | Stop reason: HOSPADM

## 2024-02-09 RX ORDER — PIPERACILLIN SODIUM, TAZOBACTAM SODIUM 4; .5 G/20ML; G/20ML
4.5 INJECTION, POWDER, LYOPHILIZED, FOR SOLUTION INTRAVENOUS EVERY 6 HOURS
Status: DISCONTINUED | OUTPATIENT
Start: 2024-02-09 | End: 2024-02-09

## 2024-02-09 RX ORDER — PROCHLORPERAZINE MALEATE 5 MG
5 TABLET ORAL EVERY 6 HOURS PRN
Status: DISCONTINUED | OUTPATIENT
Start: 2024-02-09 | End: 2024-02-14 | Stop reason: HOSPADM

## 2024-02-09 RX ORDER — PIPERACILLIN SODIUM, TAZOBACTAM SODIUM 3; .375 G/15ML; G/15ML
3.38 INJECTION, POWDER, LYOPHILIZED, FOR SOLUTION INTRAVENOUS ONCE
Status: COMPLETED | OUTPATIENT
Start: 2024-02-09 | End: 2024-02-09

## 2024-02-09 RX ORDER — ONDANSETRON 2 MG/ML
4 INJECTION INTRAMUSCULAR; INTRAVENOUS EVERY 6 HOURS PRN
Status: DISCONTINUED | OUTPATIENT
Start: 2024-02-09 | End: 2024-02-14 | Stop reason: HOSPADM

## 2024-02-09 RX ORDER — AMOXICILLIN 250 MG
2 CAPSULE ORAL 2 TIMES DAILY PRN
Status: DISCONTINUED | OUTPATIENT
Start: 2024-02-09 | End: 2024-02-14 | Stop reason: HOSPADM

## 2024-02-09 RX ORDER — ONDANSETRON 4 MG/1
4 TABLET, ORALLY DISINTEGRATING ORAL EVERY 6 HOURS PRN
Status: DISCONTINUED | OUTPATIENT
Start: 2024-02-09 | End: 2024-02-14 | Stop reason: HOSPADM

## 2024-02-09 RX ORDER — POLYETHYLENE GLYCOL 3350 17 G/17G
17 POWDER, FOR SOLUTION ORAL DAILY PRN
Status: DISCONTINUED | OUTPATIENT
Start: 2024-02-09 | End: 2024-02-14 | Stop reason: HOSPADM

## 2024-02-09 RX ORDER — HYDROCHLOROTHIAZIDE 12.5 MG/1
12.5 TABLET ORAL DAILY
Status: ON HOLD | COMMUNITY
End: 2024-02-09

## 2024-02-09 RX ADMIN — SODIUM BICARBONATE: 84 INJECTION, SOLUTION INTRAVENOUS at 19:06

## 2024-02-09 RX ADMIN — SIMVASTATIN 20 MG: 20 TABLET, FILM COATED ORAL at 21:54

## 2024-02-09 RX ADMIN — CALCIUM GLUCONATE 1 G: 98 INJECTION, SOLUTION INTRAVENOUS at 09:56

## 2024-02-09 RX ADMIN — DEXTROSE MONOHYDRATE 25 G: 25 INJECTION, SOLUTION INTRAVENOUS at 11:15

## 2024-02-09 RX ADMIN — PIPERACILLIN AND TAZOBACTAM 3.38 G: 3; .375 INJECTION, POWDER, FOR SOLUTION INTRAVENOUS at 11:05

## 2024-02-09 RX ADMIN — HEPARIN SODIUM 1300 UNITS: 1000 INJECTION INTRAVENOUS; SUBCUTANEOUS at 15:55

## 2024-02-09 RX ADMIN — DEXTROSE MONOHYDRATE 300 ML: 100 INJECTION, SOLUTION INTRAVENOUS at 11:19

## 2024-02-09 RX ADMIN — SODIUM BICARBONATE 100 MEQ: 84 INJECTION, SOLUTION INTRAVENOUS at 11:24

## 2024-02-09 RX ADMIN — SODIUM BICARBONATE: 84 INJECTION, SOLUTION INTRAVENOUS at 11:49

## 2024-02-09 RX ADMIN — SODIUM CHLORIDE 1000 ML: 9 INJECTION, SOLUTION INTRAVENOUS at 11:51

## 2024-02-09 RX ADMIN — PIPERACILLIN AND TAZOBACTAM 3.38 G: 3; .375 INJECTION, POWDER, FOR SOLUTION INTRAVENOUS at 17:36

## 2024-02-09 RX ADMIN — SODIUM CHLORIDE 1000 ML: 9 INJECTION, SOLUTION INTRAVENOUS at 09:49

## 2024-02-09 RX ADMIN — SODIUM CHLORIDE 250 ML: 9 INJECTION, SOLUTION INTRAVENOUS at 15:51

## 2024-02-09 RX ADMIN — GABAPENTIN 100 MG: 100 CAPSULE ORAL at 21:54

## 2024-02-09 RX ADMIN — SODIUM CHLORIDE 6.9 UNITS: 9 INJECTION, SOLUTION INTRAVENOUS at 11:18

## 2024-02-09 RX ADMIN — LIDOCAINE HYDROCHLORIDE 10 ML: 20 JELLY TOPICAL at 12:15

## 2024-02-09 RX ADMIN — CALCIUM GLUCONATE 1 G: 98 INJECTION, SOLUTION INTRAVENOUS at 11:15

## 2024-02-09 RX ADMIN — HEPARIN SODIUM 1300 UNITS: 1000 INJECTION INTRAVENOUS; SUBCUTANEOUS at 15:56

## 2024-02-09 RX ADMIN — SODIUM CHLORIDE 300 ML: 9 INJECTION, SOLUTION INTRAVENOUS at 15:52

## 2024-02-09 RX ADMIN — VANCOMYCIN HYDROCHLORIDE 1500 MG: 5 INJECTION, POWDER, LYOPHILIZED, FOR SOLUTION INTRAVENOUS at 11:43

## 2024-02-09 ASSESSMENT — ACTIVITIES OF DAILY LIVING (ADL)
ADLS_ACUITY_SCORE: 49
ADLS_ACUITY_SCORE: 51
ADLS_ACUITY_SCORE: 53
ADLS_ACUITY_SCORE: 38

## 2024-02-09 NOTE — CONSULTS
NEPHROLOGY CONSULTATION    CC: Vomiting and diarrhea.    REASON FOR CONSULTATION: We are asked to see pt by .    HISTORY OF PRESENT ILLNESS:78 year old male with past medical history significant for hypertension, type 2 diabetes mellitus, hyperlipidemia, atrial flutter not on chronic anticoagulation, stage III chronic kidney disease, right foot drop after back surgery who was recently 02/01-02/04 admitted to [facility] for mechanical fall and generalized weakness.  During that hospitalization patient was diagnosed with a flutter and was evaluated with cardiology the patient declined anticoagulation given history of recurrent falls.  Patient was discharged to TCU for further care.  Patient stated he has been in his usual state of health until yesterday when he had several episodes of vomiting.  This morning he developed painless diarrhea.  Patient stated he stopped urinating.  Last urination was on 02/08 morning.  As per patient report, he has been getting all of his prescribed medications including metformin, valsartan and hydrochlorothiazide.  He also received to 3 doses of ibuprofen 400 mg daily for bilateral hip pain in the last 4 days.  Patient admits bilateral pain and low back pain.  Does have history of trochanteric bursitis.  Received intra-articular Kenalog with lidocaine injections on 02/04/24.  Patient denies: fever, chills, dizziness, sore throat, rhinorrhea, cough, shortness of breath , chest pain, palpitations, orthopnea, abdominal pain, dysuria, urinary frequency, urgency, hematuria, rash, tremors, headache.     Regarding patient renal function.  Patient does have stage III chronic kidney disease and is going back to January 2022 when his serum creatinine was 1.2 mg/dL and correlating GFR of 58 mL/min.  Follow-up labs in April 2022 shows serum creatinine 1.5 mg/dL and correlating EGFR of 48 mL/min, no follow-up labs in May 2023 showed serum creatinine 1.7 mg/dL and correlating  EGFR of 41 mL/min.  He has history of moderate albuminuria of 110 mg/g in May 2023.  His urinalysis on 02/02/24 showed trace albumin of 10 mg/dL, 8 hyaline cast.  No hematuria.  Patient serum creatinine was running between 1.4 and 1.76 mg/dL during his recent hospitalization.  Follow-up labs on February 6 showed serum creatinine of 2.5 mg/dL, then follow-up labs on February 8 4.08 mg/dL.  Today serum creatinine is trending up to 5.15 mg/dL.  Patient does have moderate hyperkalemia of 6.3 ,  severe anion gap acidosis , elevated lactic acid of 6.4 and moderate hyponatremia of 127.  She also was found to have elevated white blood cell count of 23K.  Patient is hemodynamically stable, heart rate 84 bpm, respiratory 16 times per minute, temperature 97.6  F.  Blood pressure 146/72 mmHg.  Saturation 97% on room air.  Patient denies family history of kidney disease.  Patient denies history of urine tract infection and nephrolithiasis.  Patient stated he was diagnosed with diabetes about 15 years ago.  His diabetes has been mostly controlled.  Most recent hemoglobin A1c was 6.7%.      REVIEW OF SYSTEMS:  ROS was completely reviewed and otherwise negative and non-contributory    No past medical history on file.    Social History     Socioeconomic History    Marital status: Single     Spouse name: Not on file    Number of children: Not on file    Years of education: Not on file    Highest education level: Not on file   Occupational History    Not on file   Tobacco Use    Smoking status: Never    Smokeless tobacco: Never   Substance and Sexual Activity    Alcohol use: Yes     Comment: Alcoholic Drinks/day: occ    Drug use: No    Sexual activity: Not on file   Other Topics Concern    Not on file   Social History Narrative    Not on file     Social Determinants of Health     Financial Resource Strain: Not on file   Food Insecurity: Not on file   Transportation Needs: Not on file   Physical Activity: Not on file   Stress: Not on  "file   Social Connections: Not on file   Interpersonal Safety: Not on file   Housing Stability: Not on file       Family History   Problem Relation Age of Onset    Dementia Father     Cancer Maternal Grandmother     Cancer Maternal Grandfather     Cancer Paternal Grandmother     Cancer Paternal Grandfather        Allergies   Allergen Reactions    Ezetimibe-Simvastatin GI Disturbance       MEDICATIONS:   calcium gluconate  1 g Intravenous Once    dextrose 10%  300 mL Intravenous Once    dextrose  25 g Intravenous Once    insulin regular  0.1 Units/kg Intravenous Once    lidocaine  10 mL Urethral Once    piperacillin-tazobactam  3.375 g Intravenous Once    sodium bicarbonate  100 mEq Intravenous Once         PHYSICAL EXAM    BP (!) 146/72   Pulse 84   Temp 97.6  F (36.4  C) (Oral)   Resp 16   Ht 1.753 m (5' 9\")   Wt 68.9 kg (152 lb)   SpO2 97%   BMI 22.45 kg/m      No intake or output data in the 24 hours ending 02/09/24 1103    Alert/oriented x 3; awake and NAD  HEENT NC/AT; perrla; OP clear without lesions; mmm  Neck supple without LAD, TM  CV; RRR without rub or murmur  Lung: clear and equal; no extra sounds  Ab: soft ; not distended; normal bs, tender to palpation in suprapubic area.  Ext: no edema and well perfused  Skin; no rash  Neuro; grossly intact, no tremors, no asterixis.     LABORATORIES    Recent Labs   Lab 02/09/24  0941 02/06/24  0630   WBC 23.1* 11.9*   HGB 10.4* 8.6*   HCT 32.3* 26.7*    300     Recent Labs   Lab 02/09/24  0941 02/08/24  0605 02/06/24  0630   * 131* 133*   CO2 10* 16* 16*   BUN 77.8* 70.3* 52.1*   ALKPHOS 61  --   --    ALT 9  --   --    AST 16  --   --      No results for input(s): \"INR\", \"PTT\" in the last 168 hours.    Invalid input(s): \"APTT\"  Invalid input(s): \"FERRITIN\"  No results for input(s): \"IRON\" in the last 168 hours.    Invalid input(s): \"TIBC\"    I reviewed all labs    ASSESSMENT/PLAN:  78 year old male with past medical history significant for " hypertension, type 2 diabetes mellitus, hyperlipidemia, atrial flutter not on chronic anticoagulation, stage III chronic kidney disease, right foot drop after back surgery to this hospital for evaluation of vomiting and diarrhea, found to have acute kidney injury, severe lactic acidosis, moderate hyperkalemia of 6.3, hyponatremia of 127.    Severe Acute kidney injury superimposed on stage III chronic kidney disease-etiology unclear needs further investigation.  Patient states regarding kidney disease could be secondary to diabetic kidney disease+/- hypertensive nephrosclerosis based on history.  Patient does have stage III chronic kidney disease and is going back to January 2022 when his serum creatinine was 1.2 mg/dL and correlating GFR of 58 mL/min.  Follow-up labs in April 2022 shows serum creatinine 1.5 mg/dL and correlating EGFR of 48 mL/min, follow-up labs in May 2023 showed serum creatinine 1.7 mg/dL and correlating EGFR of 41 mL/min.  He has history of moderate albuminuria of 110 mg/g in May 2023.    His urinalysis on 02/02/24 showed trace albumin of 10 mg/dL, 8 hyaline cast.  No hematuria.    Patient serum creatinine was running between 1.4 and 1.76 mg/dL during  recent hospitalization.  Follow-up labs on February 6 showed serum creatinine of 2.5 mg/dL, then follow-up labs on February 8 4.08 mg/dL.  Today serum creatinine is trending up to 5.15 mg/dL.  Recs:  The patient needs hemodialysis given severe acidemia and concern for ALIYA. Risks and benefits of hemodialysis were discussed in length with the patient and is spouse. The patient decided to proceed with hemodialysis. Consent was obtained.  The first hemodialysis run today for 2 hours, blood flow rate of 200ml/min and dialysate flow rate of 400 mL/min.  We will plan next dialysis from tomorrow for 2-1/2 hours.  Blood flow rate of 250 mL/min.  Dialysate flow rate of 500 mL/min.  Appreciate emergency room help with placement of non tunneled dialysis  catheter.  Emergency room team explained obtain CT after abdomen pelvis.  Will follow.  Bladder scan to rule out urinary retention.  Check UA, UPCR, total CK.  I will order serologies including complement C3/C4, ANCA, MIGUEL for completeness.  Monitor renal panel daily.  Monitor urinary output.  Renally dose medications.  Avoid nephrotoxins.  Hold prior to admission Diovan and hydrochlorothiazide.      Hyperkalemia-moderate.  Serum potassium 6.3.  No EKG changes.  Patient received 2 g of calcium gluconate in the emergency room.  The plan is to give insulin followed by dextrose and 100 mg of sodium bicarbonate.  Will run on K2 bath.  Monitor on telemetry.  Renal diet.    Hyponatremia-moderate.  Serum sodium is 127.  Should improve with dialysis. Trend.    Severe anion gap acidosis-patient presented with lactic acid of 6.1.  Lactic acid was trended up to 6.4 on repeat labs.  ABG showed pH of 7.03, pCO2 37, bicarbonate 10.  Concerning for  ALIYA.  Patient has been taking metformin 1000 mg twice daily until admission  Give 10 mg of sodium bicarb and IV  Start on isotonic bicarbonate at 150 mL/h  We will initiate on hemodialysis as above.Run on bicarbonate bath of 32.  Discontinue metformin indefinitely and do not resume on a discharge.      Hypertension-patient blood pressure is acceptable.  Prior to admission he was taken amlodipine 10 mg daily, Diovan 160 mg daily and hydrochlorothiazide 12.5 mg daily.  Hold prior to admission antihypertensive medications.  Give hydralazine 25 mg every 8 hours as needed if systolic blood pressure greater than 160.  Monitor blood pressure closely avoid hypo and hypertension.    Volume status-patient was euvolemic on exam.  Breathing comfortable on room air.  Lungs are clear to auscultation.  Patient reports her episode of vomiting yesterday and 1 bout of diarrhea this morning.  Started on isotonic bicarbonate at 150 mL/h  No UF with hemodialysis.  Daily weight.  Strict ins and  outs.    Leukocytosis-patient presented white blood cell count of 23,000.  Patient is afebrile.  Could be secondary to dehydration.  Blood cultures collected in emergency room.  CT abdomen pelvis is pending.  I will defer further evaluation management to primary team.    Macrocytic anemia-hemoglobin trended up from 8.6 to 10.4 g/dL in last 3 days could be secondary to hemoconcentration.  No signs of active bleeding.  Trend.    History of atrial flutter-patient is currently in normal sinus rhythm.  On chronic anticoagulation.    Hyperlipidemia-PTA on statin.    History of neuropathy prior to admission was on the gabapentin 300 mg twice daily.  Recommend to hold gabapentin for now.    Discussed with the emergency room  and family medicine Dr. Manning.      Thank you for your consultation. We will follow.      Juana Doss MD  [facility]  [address] [place] [address], [address]  [place] [address]  Phone# [phone]  Fax# [phone]

## 2024-02-09 NOTE — PHARMACY-VANCOMYCIN DOSING SERVICE
"Pharmacy Vancomycin Initial Note  Date of Service 2024  Patient's  1945  78 year old, male    Indication: Community Acquired Pneumonia    Current estimated CrCl = Estimated Creatinine Clearance: 11.5 mL/min (A) (based on SCr of 5.15 mg/dL (H)).    Creatinine for last 3 days  2024:  6:05 AM Creatinine 4.08 mg/dL  2024:  9:41 AM Creatinine 5.15 mg/dL    Recent Vancomycin Level(s) for last 3 days  No results found for requested labs within last 3 days.      Vancomycin IV Administrations (past 72 hours)                     vancomycin (VANCOCIN) 1,500 mg in 0.9% NaCl 250 mL intermittent infusion (mg) 1,500 mg New Bag 24 1143                    Nephrotoxins and other renal medications (From now, onward)      Start     Dose/Rate Route Frequency Ordered Stop    24 1800  piperacillin-tazobactam (ZOSYN) 3.375 g vial to attach to  mL bag        Note to Pharmacy: For [facility], [facility] and [facility]: For Zosyn-naive patients, use the \"Zosyn initial dose + extended infusion\" order panel.    3.375 g  over 240 Minutes Intravenous EVERY 12 HOURS 24 1451      24 1547  vancomycin place brown - receiving intermittent dosing         1 each Intravenous SEE ADMIN INSTRUCTIONS 24 1547              Contrast Orders - past 72 hours (72h ago, onward)      None                    Plan:  Utilize intermittent vancomycin dosing;  pt received a loading dose in ED 2/9 AM  Vancomycin monitoring method: Renal Replacement Therapy  Vancomycin therapeutic monitoring goal: 15-20 mg/L  Pharmacy will check vancomycin levels as appropriate in 1-3 Days.    Serum creatinine levels will be ordered daily for the first week of therapy and at least twice weekly for subsequent weeks.      Gema Cuevas [facility]    "

## 2024-02-09 NOTE — PROGRESS NOTES
Patient admitted today 02/09/24 arrived on unit about 1410. Alert and oriented. Denies chest pain nausea or vomiting. Gilbert secure patent. Patient settled in room. Tele placed. VSS. Resident team at bedside. Tolerating clear liquid diet. On renal diet. Started HD per dialysis RN on arrival. Report to Julee SAMS for continuation of care.

## 2024-02-09 NOTE — ED NOTES
One set of blood cultures successful. 2 Unsuccessful attempts for 2nd set. SWAT called for assistance

## 2024-02-09 NOTE — PHARMACY-VANCOMYCIN DOSING SERVICE
Pharmacy Vancomycin Initial Note  Date of Service 2024  Patient's  1945  78 year old, male    Indication: Sepsis    Current estimated CrCl = Estimated Creatinine Clearance: 11.5 mL/min (A) (based on SCr of 5.15 mg/dL (H)).    Creatinine for last 3 days  2024:  6:05 AM Creatinine 4.08 mg/dL  2024:  9:41 AM Creatinine 5.15 mg/dL    Recent Vancomycin Level(s) for last 3 days  No results found for requested labs within last 3 days.      Vancomycin IV Administrations (past 72 hours)        No vancomycin orders with administrations in past 72 hours.                    Nephrotoxins and other renal medications (From now, onward)      Start     Dose/Rate Route Frequency Ordered Stop    24 1130  vancomycin (VANCOCIN) 1,500 mg in 0.9% NaCl 250 mL intermittent infusion         1,500 mg  over 90 Minutes Intravenous ONCE 24 1126      24 1000  piperacillin-tazobactam (ZOSYN) 3.375 g vial to attach to  mL bag         3.375 g  over 30 Minutes Intravenous ONCE 24 0959              Contrast Orders - past 72 hours (72h ago, onward)      None                  Plan:  Start vancomycin 1500 mg (21.5mg/kg) IV x1.   If vancomycin to be continued please re-order pharmacy consult    Raj Floyd RPH

## 2024-02-09 NOTE — ED PROVIDER NOTES
EMERGENCY DEPARTMENT ENCOUNTER      NAME: Pa García  AGE: 78 year old male  YOB: 1945  MRN: [id number]  EVALUATION DATE & TIME: 2/9/2024  9:04 AM    PCP: Lars Rajan    ED PROVIDER: Amanda Riddle MD    Chief Complaint   Patient presents with    Nausea, Vomiting, & Diarrhea    Abnormal Labs         FINAL IMPRESSION:  1. Hyperkalemia    2. High anion gap metabolic acidosis    3. Lactic acidosis    4. Leukocytosis, unspecified type    5. Acute renal failure superimposed on chronic kidney disease, unspecified acute renal failure type, unspecified CKD stage  (H24)          ED COURSE & MEDICAL DECISION MAKING:    Pertinent Labs & Imaging studies reviewed. (See chart for details)  78 year old male with history of HTN, HLD, DM, atrial flutter not anticoagulated, partial paraplegia with neurogenic bowel and bladder as a complication of previous back surgery with right foot drop who presents to the Emergency Department for evaluation of nausea vomiting diarrhea this morning.  Abnormal labs from his TCU for increased creatinine.  Per chart review patient has baseline renal insufficiency with a creatinine of 1.3-1.7.  However 3 days ago his creatinine increased to 2.5 and yesterday 4.0 with new hyperkalemia.  Patient really denies any complaints other than the new nausea/vomiting this morning.  Examination is otherwise unremarkable.  Differential includes dehydration, electrolyte abnormality, worsening renal insufficiency, prerenal component as well as a postrenal component, UTI given his complaint of dark urine.  Abdomen is overall benign.  Recently hospitalized for trochanteric bursitis but has not been using any NSAIDs.    Patient placed on monitor, IV established and blood obtained.  Nausea improved after Zofran per EMS.  Twelve-lead EKG shows atrial flutter with variable block.  Does have some peaked T waves on EKG with new QRS widening.  Patient given calcium gluconate, 1 L normal saline bolus.   CBC, BMP, LFTs, lipase, lactate notable for lactate of 6.1.  Empirically covered with Zosyn, vancomycin, and blood cultures obtained.   WBC also elevated at 23.1.  BMP with sodium of 127, potassium of 6.3, bicarb of 10, gap 25, BUN of 77 creatinine of 5.15.  Patient is on metformin chronically, question metformin induced lactic acidosis.  Nephrology consulted, agrees with calcium, bicarb bolus, insulin/glucose, bicarb drip.  However came to ED ED to evaluate patient and patient admits to the last time he urinated was yesterday morning.  Given 24 hours of anuria, nephrology recommended hemodialysis.  Nontunneled right IJ hemodialysis catheter placed, please see procedure note.  Case discussed with intensivist, resident service for admission.  CT abdomen pelvis without contrast pending at time of this dictation.  Patient will go from CT to the ICU for admission.      ED Course as of 02/09/24 1246   Fri Feb 09, 2024   0910 I met with the patient, obtained history, performed an initial exam, and discussed options and plan for diagnostics and treatment here in the ED.       1001 Lactic Acid(!!): 6.1   1035 Potassium(!!): 6.3   1035 Carbon Dioxide (CO2)(!!): 10   1035 Urea Nitrogen(!): 77.8   1035 Creatinine(!): 5.15   1053 Case discussed w Dr. Doss, renal   1107 Hemoglobin(!): 10.4  chronic   1107 WBC(!): 23.1   1125 Lactic Acid(!!): 6.4   1145 Ph Venous(!!): 7.03   1145 Bicarbonate Venous(!!): 10   1205 Case discussed with Dr. Ricci, intensivist    1205 Case discussed with resident service    1246 XR Chest Port 1 View  X-ray independently interpreted by myself with hemodialysis catheter in good position       Medical Decision Making    History:  Supplemental history from: EMS and Other: Staff from care facility who called ahead with patient information  External Record(s) reviewed: Inpatient Record: 1/4/2024 discharge summary    Work Up:  Chart documentation includes differential considered and any EKGs or imaging  independently interpreted by provider, see MDM  In additional to work up documented, I considered the following work up: see MDM    External consultation:  Discussion of management with another provider: Nephrology, intensivist, resident service for admission    Complicating factors:  Care impacted by chronic illness: Chronic Kidney Disease, Diabetes, Heart Disease, Hyperlipidemia, and Hypertension  Care affected by social determinants of health: Access to Medical Care referred to ED    Disposition considerations: Admit.        At the conclusion of the encounter I discussed the results of all of the tests and the disposition. The questions were answered. The patient or family acknowledged understanding and was agreeable with the care plan.    CRITICAL CARE:  Critical Care  Performed by: Amanda Riddle MD  Authorized by: Amanda Riddle MD     Total critical care time: 73 minutes  Criticalcare time was exclusive of separately billable procedures and treating other patients.  Critical care was necessary to treat or prevent imminent or life-threatening deterioration of the following conditions: Acute renal failure, metabolic acidosis, hyperkalemia  Critical care was time spent personally by me on the following activities: development of treatment plan with patient or surrogate, discussions with consultants, examination of patient, evaluation of patient's response totreatment, obtaining history from patient or surrogate, ordering and performing treatments and interventions, ordering and review of laboratory studies, ordering and review of radiographic studies and re-evaluation ofpatient's condition, this excludes any separately billable procedures.      MEDICATIONS GIVEN IN THE EMERGENCY:  Medications   glucose gel 15-30 g (has no administration in time range)     Or   dextrose 50 % injection 25-50 mL (has no administration in time range)     Or   glucagon injection 1 mg (has no administration in time range)   dextrose  10% BOLUS 300 mL (300 mLs Intravenous $New Bag 2/9/24 1119)   sodium bicarbonate 150 mEq in D5W 1,000 mL infusion ( Intravenous $New Bag 2/9/24 1149)   lidocaine (XYLOCAINE) 2 % external gel 10 mL (has no administration in time range)   vancomycin (VANCOCIN) 1,500 mg in 0.9% NaCl 250 mL intermittent infusion (1,500 mg Intravenous $New Bag 2/9/24 1143)   sodium chloride 0.9% BOLUS 1,000 mL (1,000 mLs Intravenous $New Bag 2/9/24 0949)   calcium gluconate 10 % injection 1 g (1 g Intravenous $New Bag 2/9/24 0956)   piperacillin-tazobactam (ZOSYN) 3.375 g vial to attach to  mL bag (3.375 g Intravenous $New Bag 2/9/24 1105)   calcium gluconate 10 % injection 1 g (1 g Intravenous $New Bag 2/9/24 1115)   sodium bicarbonate 8.4 % injection 100 mEq (100 mEq Intravenous $Given 2/9/24 1124)   dextrose 50 % injection 25 g (25 g Intravenous $Given 2/9/24 1115)   insulin regular 1 unit/mL injection 6.9 Units (6.9 Units Intravenous $Given 2/9/24 1118)   sodium chloride 0.9% BOLUS 1,000 mL (1,000 mLs Intravenous $New Bag 2/9/24 1151)       NEW PRESCRIPTIONS STARTED AT TODAY'S ER VISIT  New Prescriptions    No medications on file          =================================================================    HPI    Patient information was obtained from: Patient     Use of Intrepreter: N/A       Pa García is a 78 year old male with pertinent medical history of T2DM, hypertension, hyperlipidemia, CKD stage 1 who presents for evaluation of nausea, vomiting, diarrhea. The patient reports that he has been staying at [facility] for the past 3-4 days, following his discharge from the hospital. Since this morning, he has been experiencing nausea, vomiting, and diarrhea. He notes that prior to this morning, he felt at his baseline and had been eating and drinking normally, but mentions that his urine has been darker lately. Patient says the IM zofran administered by EMS en route helped his nausea. He reports that his blood sugar  was 127 mg/dL this morning. Patient uses a walker or cane to ambulate at baseline and mentions a history of bursitis in his hips.    Denies hematemesis, hematochezia, new pain, or any other concerns at this time. Patient denies any recent medication changes or use of NSAIDs or antibiotics. Patient does not take any blood thinners.    Per chart review, patient was admitted to [facility] on 02/01/2024 for mechanical fall and generalized weakness. Patient presented to the ED after he lost his balance, causing him to fall backwards. EKG showed atrial flutter and variable AV block. Labs showed mild elevation of troponin that trended down on repeat, creatinine 1.76, and were otherwise unremarkable. Patient was discharged to [facility] TCU on 02/04/2024 with instructions to start taking polyethylene glycol 17g as needed, and follow-up with PCP in 2 weeks.      PAST MEDICAL HISTORY:  No past medical history on file.    PAST SURGICAL HISTORY:  Past Surgical History:   Procedure Laterality Date    BACK SURGERY      lumbar laminectomy and fusion/decompression    LUMBAR LAMINECTOMY Bilateral 3/8/2016    Procedure: REVISION DECOMPRESSION BILATERAL L2-3;  Surgeon: Alexis Amado MD;  Location: [facility];  Service:     LUMBAR LAMINECTOMY  3/13/2016    Procedure: Posterior Laminectomy L1-L3 with Dural Repair, and Microscopic Discectomy;  Surgeon: Foster Rae MD;  Location: [facility];  Service:        CURRENT MEDICATIONS:    Prior to Admission Medications   Prescriptions Last Dose Informant Patient Reported? Taking?   acetaminophen (TYLENOL) 325 MG tablet More than a month at PRN  Yes Yes   Sig: [ACETAMINOPHEN (TYLENOL) 325 MG TABLET] Take 650 mg by mouth every 4 (four) hours as needed for pain.   amLODIPine (NORVASC) 2.5 MG tablet 2/8/2024 at am  Yes Yes   Sig: Take 2.5 mg by mouth daily   aspirin 81 MG EC tablet 2/8/2024 at am  Yes Yes   Sig: [ASPIRIN 81 MG EC TABLET] Take 81 mg by mouth daily.  "  gabapentin (NEURONTIN) 300 MG capsule 2/8/2024 at pm  Yes Yes   Sig: Take 300 mg by mouth 2 times daily   hydroCHLOROthiazide (HYDRODIURIL) 12.5 MG tablet 2/8/2024 at am  Yes Yes   Sig: Take 12.5 mg by mouth daily   lansoprazole (PREVACID) 30 MG capsule 2/8/2024 at pm  Yes Yes   Sig: Take 30 mg by mouth at bedtime   metFORMIN (GLUCOPHAGE) 1000 MG tablet 2/8/2024 at am  Yes Yes   Sig: Take 1,000 mg by mouth 2 times daily (with meals)   omega 3 1000 MG CAPS 2/8/2024 at am  Yes Yes   polyethylene glycol (MIRALAX) 17 GM/Dose powder More than a month at PRN  No Yes   Sig: Take 17 g by mouth daily as needed for constipation   simvastatin (ZOCOR) 20 MG tablet 2/8/2024 at AM  No Yes   Sig: Take 1 tablet (20 mg) by mouth at bedtime   valsartan (DIOVAN) 160 MG tablet 2/8/2024 at am  Yes Yes   Sig: Take 160 mg by mouth daily   vit C,R-Sh-imqjd-lutein-zeaxan (PRESERVISION AREDS-2) 250-90-40-1 mg cap 2/8/2024 at PM  Yes Yes   Sig: [VIT C,N-ZN-GBEKZ-LUTEIN-ZEAXAN (PRESERVISION AREDS-2) 250-90-40-1 MG CAP] Take 1 capsule by mouth 2 (two) times a day.      Facility-Administered Medications: None       ALLERGIES:  Allergies   Allergen Reactions    Ezetimibe-Simvastatin GI Disturbance       FAMILY HISTORY:  Family History   Problem Relation Age of Onset    Dementia Father     Cancer Maternal Grandmother     Cancer Maternal Grandfather     Cancer Paternal Grandmother     Cancer Paternal Grandfather        SOCIAL HISTORY:  Social History     Tobacco Use    Smoking status: Never    Smokeless tobacco: Never   Substance Use Topics    Alcohol use: Yes     Comment: Alcoholic Drinks/day: occ    Drug use: No        VITALS:  Patient Vitals for the past 24 hrs:   BP Temp Temp src Pulse Resp SpO2 Height Weight   02/09/24 0955 -- 97.6  F (36.4  C) Oral -- -- -- -- --   02/09/24 0909 (!) 146/72 -- -- 84 16 97 % 1.753 m (5' 9\") 68.9 kg (152 lb)       PHYSICAL EXAM    General Appearance: Well-appearing, well-nourished, no acute distress. Holding " emesis basin.  Head:  Normocephalic  ENT:  membranes are dry  Neck:  Supple  Cardio:  Regular rate and rhythm, no murmur/gallop/rub  Pulm:  No respiratory distress, clear to auscultation bilaterally  Back:  No CVA tenderness, normal ROM  Abdomen:  Soft, non-tender, non distended,no rebound or guarding.  Extremities:  1+ edema of bilateral lower extremities.  Neuro:  Alert and oriented ×3     RADIOLOGY/LABS:  Reviewed all pertinent imaging. Please see official radiology report. All pertinent labs reviewed and interpreted.    Results for orders placed or performed during the hospital encounter of 02/09/24   Basic metabolic panel   Result Value Ref Range    Sodium 127 (L) 135 - 145 mmol/L    Potassium 6.3 (HH) 3.4 - 5.3 mmol/L    Chloride 92 (L) 98 - 107 mmol/L    Carbon Dioxide (CO2) 10 (LL) 22 - 29 mmol/L    Anion Gap 25 (H) 7 - 15 mmol/L    Urea Nitrogen 77.8 (H) 8.0 - 23.0 mg/dL    Creatinine 5.15 (H) 0.67 - 1.17 mg/dL    GFR Estimate 11 (L) >60 mL/min/1.73m2    Calcium 8.6 (L) 8.8 - 10.2 mg/dL    Glucose 83 70 - 99 mg/dL   Hepatic panel   Result Value Ref Range    Protein Total 6.7 6.4 - 8.3 g/dL    Albumin 3.7 3.5 - 5.2 g/dL    Bilirubin Total 0.3 <=1.2 mg/dL    Alkaline Phosphatase 61 40 - 150 U/L    AST 16 0 - 45 U/L    ALT 9 0 - 70 U/L    Bilirubin Direct <0.20 0.00 - 0.30 mg/dL   Result Value Ref Range    Lipase 9 (L) 13 - 60 U/L   Lactic acid whole blood   Result Value Ref Range    Lactic Acid 6.1 (HH) 0.7 - 2.0 mmol/L   CBC with platelets and differential   Result Value Ref Range    WBC Count 23.1 (H) 4.0 - 11.0 10e3/uL    RBC Count 3.19 (L) 4.40 - 5.90 10e6/uL    Hemoglobin 10.4 (L) 13.3 - 17.7 g/dL    Hematocrit 32.3 (L) 40.0 - 53.0 %     (H) 78 - 100 fL    MCH 32.6 26.5 - 33.0 pg    MCHC 32.2 31.5 - 36.5 g/dL    RDW 13.3 10.0 - 15.0 %    Platelet Count 412 150 - 450 10e3/uL    % Neutrophils 85 %    % Lymphocytes 5 %    % Monocytes 8 %    % Eosinophils 0 %    % Basophils 0 %    % Immature  Granulocytes 2 %    NRBCs per 100 WBC 0 <1 /100    Absolute Neutrophils 19.7 (H) 1.6 - 8.3 10e3/uL    Absolute Lymphocytes 1.1 0.8 - 5.3 10e3/uL    Absolute Monocytes 1.8 (H) 0.0 - 1.3 10e3/uL    Absolute Eosinophils 0.0 0.0 - 0.7 10e3/uL    Absolute Basophils 0.1 0.0 - 0.2 10e3/uL    Absolute Immature Granulocytes 0.4 <=0.4 10e3/uL    Absolute NRBCs 0.0 10e3/uL   Lactic acid whole blood   Result Value Ref Range    Lactic Acid 6.4 (HH) 0.7 - 2.0 mmol/L   Blood gas venous   Result Value Ref Range    pH Venous 7.03 (LL) 7.32 - 7.43    pCO2 Venous 37 (L) 40 - 50 mm Hg    pO2 Venous 53 (H) 25 - 47 mm Hg    Bicarbonate Venous 10 (LL) 21 - 28 mmol/L    Base Excess/Deficit Venous -21.4 (L) -3.0 - 3.0 mmol/L    FIO2 21     Oxyhemoglobin Venous 74 70 - 75 %    O2 Sat, Venous 74.3 70.0 - 75.0 %   Result Value Ref Range    CK 72 39 - 308 U/L   RBC and Platelet Morphology   Result Value Ref Range    Platelet Assessment  Automated Count Confirmed. Platelet morphology is normal.     Automated Count Confirmed. Platelet morphology is normal.    Acanthocytes Slight (A) None Seen    Camille Rods      Basophilic Stippling      Bite Cells      Blister Cells      [place] Cells      Elliptocytes      Hgb C Crystals      [place]-Jolly Bodies      Hypersegmented Neutrophils      Polychromasia      RBC agglutination      RBC Fragments      Reactive Lymphocytes      Rouleaux      Sickle Cells      Smudge Cells      Spherocytes      Stomatocytes      Target Cells      Teardrop Cells      Toxic Neutrophils      RBC Morphology Confirmed RBC Indices    ECG 12-LEAD WITH MUSE (E)   Result Value Ref Range    Systolic Blood Pressure 146 mmHg    Diastolic Blood Pressure 72 mmHg    Ventricular Rate 86 BPM    Atrial Rate 86 BPM    WI Interval 124 ms    QRS Duration 122 ms     ms    QTc 440 ms    P Axis 84 degrees    R AXIS -42 degrees    T Axis 81 degrees    Interpretation ECG       Sinus rhythm with Premature supraventricular complexes  Left axis  deviation  Septal infarct (cited on or before 01-FEB-2024)  Abnormal ECG  When compared with ECG of 02-FEB-2024 02:11,  Sinus rhythm has replaced Atrial flutter  Incomplete right bundle branch block is no longer Present  Confirmed by SEE ED PROVIDER NOTE FOR, ECG INTERPRETATION ([id number]),  DANIELS RADHA ([id number]) on 2/9/2024 9:13:49 AM         EKG:  Performed at: 09:10    Impression: Atrial flutter with variable block. Left axis deviation. Septal infarct (cited on or before 01-FEB-2024). Abnormal ECG. When compared with ECG of 02-FEB-2024 02:11, peaked T-waves with new QRS widening.    Rate: 86 bpm  Rhythm: Atrial flutter with variable block  Axis: 84 -42 81  ND Interval: 124 ms  QRS Interval: 122 ms  QTc Interval: 368/440 ms  ST Changes: Peaked T-waves with new QRS widening.  Comparison: When compared with ECG of 02-FEB-2024 02:11, peaked T-waves with new QRS widening.  I have independently reviewed and interpreted the EKG(s) documented above.    PROCEDURES:    PROCEDURE: Hemodialysis Catheter Insertion   TYPE: Double-lumen nontunneled right IJ   INDICATIONS: Hemodialysis   PROCEDURE PROVIDER: Dr Amanda Riddle   CONSENT: Risks, benefits and alternatives were discussed with and Written consent was obtained from Patient.   TIME OUT: Universal protocol was followed. TIME OUT conducted just prior to starting procedure confirmed patient identity, site/side, procedure, patient position, and availability of correct equipment. Yes   SITE: Right internal jugular   MEDICATIONS: 3 mLs of 1% Lidocaine without epinephrine   NOTE: Central line bundle elements were completed including preparatory hand cleansing, donning full barrier precautions, use of a full body drape and chlorhexidine gluconate skin prep.  The line insertion site was selected as lowest risk for mechanical complication after reviewing patient anatomy. A vascular ultrasound device was used to locate the vein. A finder needle was used to enter the vein, through  which a guidewire was inserted without resistance. Venous placement of guidewire was confirmed again with ultrasound. The finder needle was then removed and the tract was dilated with a dilator. The central venous catheter was then inserted over the guidewire without difficulty. The guidewire was removed and the catheter was secured to the underlying skin. I verified removal of the wire and dilator: Yes. Blood was withdrawn from the each port and then flushed with sterile saline.  Requested with pharmacy to place heparin order for her line flush.  The line was secured in place and a dressing was placed over the site.    A post-procedure chest radiograph was ordered, reviewed, no pneumothorax seen, and tip of line in good position near the cavoatrial junction     COMPLICATIONS: Patient tolerated procedure well, without complication        The creation of this record is based on the scribe s observations of the work being performed by Amanda Riddle MD and the provider s statements to them. It was created on her behalf by Naa Dover, a trained medical scribe. This document has been checked and approved by the attending provider.    Amadna Riddle MD  Emergency Medicine  [facility] EMERGENCY ROOM  [address] [id number] [phone]  Dept: [phone]       Amanda Riddle MD  02/09/24 9288

## 2024-02-09 NOTE — LETTER
2/9/2024        RE: Pa García  [address] [facility] [id number]        [facility]  Chief Complaint   Patient presents with     RECHECK     [facility] Record Number:  [id number]  Place of Service where encounter took place:  [facility] ([facility]) [[id number]]  Code Status:  Full Code     HISTORY:      HPI:  Pa García  is 78 year old (1945) undergoing physical and occupational therapy. He is with past medical history hypertension, chronic bilateral hip pain, diabetes type 2, GERD, HDL,Excerpted from records  He presented  for progressively worsening generalized weakness and mechanical fall.  No LOS, did not hit head.  No associated symptoms.  No chest pain or palpitations. Presented afebrile, vitally stable, satting well on RA.  He had mild elevation of troponin that down trended on repeat otherwise CBC, CMP Pro-Julio, lactic acid, UA, chest x-ray, UA, flu/RSV/COVID were unremarkable.  Blood cultures have been no growth to date.  He was also noted to have atrial flutter with an elevated troponin per records the flutter appears to be new.  He was asymptomatic, cardiology was consulted and he had an echo that had normal LV function without wall motion abnormalities.  He was recommended he be a candidate for anticoagulation however patient refused due to history of recurrent falls.  He plans to follow-up outpatient cardiology for possible electrophysiology consult       Today he was seen at the bedside to review vital signs, labs, routine visit.  Recently noted to have hyperkalemia was given Kayexalate.  Repeat labs worsening.  Creatinine went from 2.54 to 4.08.  Continue to be hyperkalemic at 5.7 with a sodium of 131.  He was also with nausea and vomiting this morning.  He did report to writer that the vomiting started this morning.  He denied chest pain shortness of breath cough, he was noted to have upper airway congestion this morning.  Denied constipation or diarrhea.  He is  with chronic right foot drop.  Type II diabetic on metformin which was discontinued last night from the on-call provider.  He does have chronic pain bilateral hips left more pronounced than the right.  Per hospital records he did receive bilateral hip injections in the hospital.  Per his report he ambulates with a cane and a four-wheel walker for longer distances.  He was sent to the ER for further evaluation and acute kidney injury.    ALLERGIES:Ezetimibe-simvastatin and Metformin    PAST MEDICAL HISTORY: History reviewed. No pertinent past medical history.    PAST SURGICAL HISTORY:   has a past surgical history that includes back surgery; Lumbar Laminectomy (Bilateral, 3/8/2016); and Lumbar Laminectomy (3/13/2016).    FAMILY HISTORY: family history includes Cancer in his maternal grandfather, maternal grandmother, paternal grandfather, and paternal grandmother; Dementia in his father.    SOCIAL HISTORY:  reports that he has never smoked. He has never used smokeless tobacco. He reports current alcohol use. He reports that he does not use drugs.    ROS:  Constitutional: Negative for activity change, appetite change, fatigue and fever. HX Falls  HENT: Negative for congestion.    Respiratory: Negative for cough, shortness of breath and wheezing.    Cardiovascular: Negative for chest pain and leg swelling.   Gastrointestinal: Negative for abdominal distention, abdominal pain, constipation, diarrhea positive for nausea and vomiting   genitourinary: Negative for dysuria.   Musculoskeletal: Positive  for arthralgia. Negative for back pain. Bilateral hips   Skin: Negative for color change and wound. Per records stage 1 coccyx   Neurological: Negative for dizziness.   Psychiatric/Behavioral: Negative for agitation, behavioral problems and confusion.     Physical Exam:  Constitutional:       Appearance: Patient is well-developed.   HENT:      Head: Normocephalic.   Eyes:      Conjunctiva/sclera: Conjunctivae normal.   Neck:  "     Musculoskeletal: Normal range of motion.   Cardiovascular:      Rate and Rhythm: Normal rate and regular rhythm.      Heart sounds: Normal heart sounds. No murmur.   Pulmonary:      Effort: No respiratory distress.      Breath sounds: Normal breath sounds. No wheezing or rales.   Abdominal:      General: Bowel sounds are normal. There is no distension.      Palpations: Abdomen is soft.      Tenderness: There is no abdominal tenderness.   Musculoskeletal:       Normal range of motion.     Skin:General:        Skin is warm.   Neurological:         Mental Status: Patient is alert and oriented to person, place, and time.   Psychiatric:         Behavior: Behavior normal.     Vitals:BP (!) 165/66   Pulse 98   Temp 97.8  F (36.6  C)   Resp 18   Ht 1.753 m (5' 9\")   Wt 68.9 kg (152 lb)   SpO2 92%   BMI 22.45 kg/m   and Body mass index is 22.45 kg/m .    Lab/Diagnostic data:   Recent Results (from the past 240 hour(s))   ECG 12-LEAD WITH MUSE ([facility])    Collection Time: 02/01/24 11:08 PM   Result Value Ref Range    Systolic Blood Pressure  mmHg    Diastolic Blood Pressure  mmHg    Ventricular Rate 95 BPM    Atrial Rate 95 BPM    RI Interval  ms    QRS Duration 92 ms     ms    QTc 439 ms    P Axis  degrees    R AXIS 28 degrees    T Axis 25 degrees    Interpretation ECG       Sinus rhythm with A-V dissociation and Accelerated Junctional rhythm with occasional Premature ventricular complexes  Incomplete right bundle branch block  Septal infarct , age undetermined  Abnormal ECG  When compared with ECG of 08-JUN-2021 13:20,  Junctional rhythm has replaced Sinus rhythm  Incomplete right bundle branch block is now Present  Septal infarct is now Present     Comprehensive metabolic panel    Collection Time: 02/01/24 11:10 PM   Result Value Ref Range    Sodium 143 135 - 145 mmol/L    Potassium 4.6 3.4 - 5.3 mmol/L    Carbon Dioxide (CO2) 22 22 - 29 mmol/L    Anion Gap 11 7 - 15 mmol/L    Urea Nitrogen 31.1 (H) 8.0 - " 23.0 mg/dL    Creatinine 1.76 (H) 0.67 - 1.17 mg/dL    GFR Estimate 39 (L) >60 mL/min/1.73m2    Calcium 9.6 8.8 - 10.2 mg/dL    Chloride 110 (H) 98 - 107 mmol/L    Glucose 107 (H) 70 - 99 mg/dL    Alkaline Phosphatase 70 40 - 150 U/L    AST 20 0 - 45 U/L    ALT 10 0 - 70 U/L    Protein Total 6.8 6.4 - 8.3 g/dL    Albumin 4.2 3.5 - 5.2 g/dL    Bilirubin Total 0.4 <=1.2 mg/dL   Troponin T, High Sensitivity (now)    Collection Time: 02/01/24 11:10 PM   Result Value Ref Range    Troponin T, High Sensitivity 85 (H) <=22 ng/L   Procalcitonin    Collection Time: 02/01/24 11:10 PM   Result Value Ref Range    Procalcitonin 0.29 <0.50 ng/mL   Lactic acid whole blood    Collection Time: 02/01/24 11:10 PM   Result Value Ref Range    Lactic Acid 1.5 0.7 - 2.0 mmol/L   Blood Culture Peripheral Blood    Collection Time: 02/01/24 11:10 PM    Specimen: Peripheral Blood   Result Value Ref Range    Culture No Growth    CBC with platelets and differential    Collection Time: 02/01/24 11:10 PM   Result Value Ref Range    WBC Count 10.8 4.0 - 11.0 10e3/uL    RBC Count 2.96 (L) 4.40 - 5.90 10e6/uL    Hemoglobin 9.6 (L) 13.3 - 17.7 g/dL    Hematocrit 29.1 (L) 40.0 - 53.0 %    MCV 98 78 - 100 fL    MCH 32.4 26.5 - 33.0 pg    MCHC 33.0 31.5 - 36.5 g/dL    RDW 13.5 10.0 - 15.0 %    Platelet Count 263 150 - 450 10e3/uL    % Neutrophils 83 %    % Lymphocytes 8 %    % Monocytes 8 %    % Eosinophils 0 %    % Basophils 0 %    % Immature Granulocytes 1 %    NRBCs per 100 WBC 0 <1 /100    Absolute Neutrophils 9.0 (H) 1.6 - 8.3 10e3/uL    Absolute Lymphocytes 0.8 0.8 - 5.3 10e3/uL    Absolute Monocytes 0.9 0.0 - 1.3 10e3/uL    Absolute Eosinophils 0.0 0.0 - 0.7 10e3/uL    Absolute Basophils 0.0 0.0 - 0.2 10e3/uL    Absolute Immature Granulocytes 0.1 <=0.4 10e3/uL    Absolute NRBCs 0.0 10e3/uL   Hemoglobin A1c    Collection Time: 02/01/24 11:10 PM   Result Value Ref Range    Hemoglobin A1C 6.7 (H) <5.7 %   Blood Culture Peripheral Blood    Collection  Time: 02/02/24 12:01 AM    Specimen: Peripheral Blood   Result Value Ref Range    Culture No Growth    Asymptomatic Influenza A/B, RSV, & SARS-CoV2 PCR (COVID-19) Nasopharyngeal    Collection Time: 02/02/24 12:13 AM    Specimen: Nasopharyngeal; Swab   Result Value Ref Range    Influenza A PCR Negative Negative    Influenza B PCR Negative Negative    RSV PCR Negative Negative    SARS CoV2 PCR Negative Negative   UA with Microscopic reflex to Culture    Collection Time: 02/02/24  1:41 AM    Specimen: Urine, Catheter   Result Value Ref Range    Color Urine Light Yellow Colorless, Straw, Light Yellow, Yellow    Appearance Urine Clear Clear    Glucose Urine Negative Negative mg/dL    Bilirubin Urine Negative Negative    Ketones Urine Negative Negative mg/dL    Specific Gravity Urine 1.011 1.001 - 1.030    Blood Urine Negative Negative    pH Urine 5.0 5.0 - 7.0    Protein Albumin Urine 10 (A) Negative mg/dL    Urobilinogen Urine <2.0 <2.0 mg/dL    Nitrite Urine Negative Negative    Leukocyte Esterase Urine Negative Negative    Bacteria Urine Few (A) None Seen /HPF    Mucus Urine Present (A) None Seen /LPF    RBC Urine 1 <=2 /HPF    WBC Urine 1 <=5 /HPF    Squamous Epithelials Urine <1 <=1 /HPF    Hyaline Casts Urine 8 (H) <=2 /LPF   Troponin T, High Sensitivity (now)    Collection Time: 02/02/24  2:39 AM   Result Value Ref Range    Troponin T, High Sensitivity 70 (H) <=22 ng/L   TSH    Collection Time: 02/02/24  2:39 AM   Result Value Ref Range    TSH 2.62 0.30 - 4.20 uIU/mL   Basic metabolic panel    Collection Time: 02/02/24  5:40 AM   Result Value Ref Range    Sodium 143 135 - 145 mmol/L    Potassium 4.1 3.4 - 5.3 mmol/L    Chloride 112 (H) 98 - 107 mmol/L    Carbon Dioxide (CO2) 23 22 - 29 mmol/L    Anion Gap 8 7 - 15 mmol/L    Urea Nitrogen 28.9 (H) 8.0 - 23.0 mg/dL    Creatinine 1.57 (H) 0.67 - 1.17 mg/dL    GFR Estimate 45 (L) >60 mL/min/1.73m2    Calcium 8.9 8.8 - 10.2 mg/dL    Glucose 96 70 - 99 mg/dL   CBC with  platelets    Collection Time: 02/02/24  5:40 AM   Result Value Ref Range    WBC Count 8.4 4.0 - 11.0 10e3/uL    RBC Count 2.74 (L) 4.40 - 5.90 10e6/uL    Hemoglobin 9.0 (L) 13.3 - 17.7 g/dL    Hematocrit 27.2 (L) 40.0 - 53.0 %    MCV 99 78 - 100 fL    MCH 32.8 26.5 - 33.0 pg    MCHC 33.1 31.5 - 36.5 g/dL    RDW 13.7 10.0 - 15.0 %    Platelet Count 232 150 - 450 10e3/uL   Glucose by meter    Collection Time: 02/02/24  8:30 AM   Result Value Ref Range    GLUCOSE BY METER POCT 104 (H) 70 - 99 mg/dL   Basic metabolic panel    Collection Time: 02/03/24  4:19 AM   Result Value Ref Range    Sodium 141 135 - 145 mmol/L    Potassium 4.0 3.4 - 5.3 mmol/L    Chloride 108 (H) 98 - 107 mmol/L    Carbon Dioxide (CO2) 22 22 - 29 mmol/L    Anion Gap 11 7 - 15 mmol/L    Urea Nitrogen 23.2 (H) 8.0 - 23.0 mg/dL    Creatinine 1.38 (H) 0.67 - 1.17 mg/dL    GFR Estimate 52 (L) >60 mL/min/1.73m2    Calcium 9.5 8.8 - 10.2 mg/dL    Glucose 137 (H) 70 - 99 mg/dL   Glucose by meter    Collection Time: 02/04/24  2:08 PM   Result Value Ref Range    GLUCOSE BY METER POCT 175 (H) 70 - 99 mg/dL   Basic metabolic panel    Collection Time: 02/06/24  6:30 AM   Result Value Ref Range    Sodium 133 (L) 135 - 145 mmol/L    Potassium 5.7 (H) 3.4 - 5.3 mmol/L    Chloride 103 98 - 107 mmol/L    Carbon Dioxide (CO2) 16 (L) 22 - 29 mmol/L    Anion Gap 14 7 - 15 mmol/L    Urea Nitrogen 52.1 (H) 8.0 - 23.0 mg/dL    Creatinine 2.54 (H) 0.67 - 1.17 mg/dL    GFR Estimate 25 (L) >60 mL/min/1.73m2    Calcium 8.4 (L) 8.8 - 10.2 mg/dL    Glucose 132 (H) 70 - 99 mg/dL   CBC with platelets    Collection Time: 02/06/24  6:30 AM   Result Value Ref Range    WBC Count 11.9 (H) 4.0 - 11.0 10e3/uL    RBC Count 2.66 (L) 4.40 - 5.90 10e6/uL    Hemoglobin 8.6 (L) 13.3 - 17.7 g/dL    Hematocrit 26.7 (L) 40.0 - 53.0 %     78 - 100 fL    MCH 32.3 26.5 - 33.0 pg    MCHC 32.2 31.5 - 36.5 g/dL    RDW 14.0 10.0 - 15.0 %    Platelet Count 300 150 - 450 10e3/uL   Basic metabolic  panel    Collection Time: 02/08/24  6:05 AM   Result Value Ref Range    Sodium 131 (L) 135 - 145 mmol/L    Potassium 5.7 (H) 3.4 - 5.3 mmol/L    Chloride 99 98 - 107 mmol/L    Carbon Dioxide (CO2) 16 (L) 22 - 29 mmol/L    Anion Gap 16 (H) 7 - 15 mmol/L    Urea Nitrogen 70.3 (H) 8.0 - 23.0 mg/dL    Creatinine 4.08 (H) 0.67 - 1.17 mg/dL    GFR Estimate 14 (L) >60 mL/min/1.73m2    Calcium 8.4 (L) 8.8 - 10.2 mg/dL    Glucose 100 (H) 70 - 99 mg/dL   ECG 12-LEAD WITH MUSE ([facility])    Collection Time: 02/09/24  9:10 AM   Result Value Ref Range    Systolic Blood Pressure 146 mmHg    Diastolic Blood Pressure 72 mmHg    Ventricular Rate 86 BPM    Atrial Rate 86 BPM    IA Interval 124 ms    QRS Duration 122 ms     ms    QTc 440 ms    P Axis 84 degrees    R AXIS -42 degrees    T Axis 81 degrees    Interpretation ECG       Sinus rhythm with Premature supraventricular complexes  Left axis deviation  Septal infarct (cited on or before 01-FEB-2024)  Abnormal ECG  When compared with ECG of 02-FEB-2024 02:11,  Sinus rhythm has replaced Atrial flutter  Incomplete right bundle branch block is no longer Present  Confirmed by SEE ED PROVIDER NOTE FOR, ECG INTERPRETATION ([id number]),  RADHA DANIELS ([id number]) on 2/9/2024 9:13:49 AM     Basic metabolic panel    Collection Time: 02/09/24  9:41 AM   Result Value Ref Range    Sodium 127 (L) 135 - 145 mmol/L    Potassium 6.3 (HH) 3.4 - 5.3 mmol/L    Chloride 92 (L) 98 - 107 mmol/L    Carbon Dioxide (CO2) 10 (LL) 22 - 29 mmol/L    Anion Gap 25 (H) 7 - 15 mmol/L    Urea Nitrogen 77.8 (H) 8.0 - 23.0 mg/dL    Creatinine 5.15 (H) 0.67 - 1.17 mg/dL    GFR Estimate 11 (L) >60 mL/min/1.73m2    Calcium 8.6 (L) 8.8 - 10.2 mg/dL    Glucose 83 70 - 99 mg/dL   Hepatic panel    Collection Time: 02/09/24  9:41 AM   Result Value Ref Range    Protein Total 6.7 6.4 - 8.3 g/dL    Albumin 3.7 3.5 - 5.2 g/dL    Bilirubin Total 0.3 <=1.2 mg/dL    Alkaline Phosphatase 61 40 - 150 U/L    AST 16 0 - 45 U/L    ALT 9 0  - 70 U/L    Bilirubin Direct <0.20 0.00 - 0.30 mg/dL   Lipase    Collection Time: 02/09/24  9:41 AM   Result Value Ref Range    Lipase 9 (L) 13 - 60 U/L   Lactic acid whole blood    Collection Time: 02/09/24  9:41 AM   Result Value Ref Range    Lactic Acid 6.1 (HH) 0.7 - 2.0 mmol/L   CBC with platelets and differential    Collection Time: 02/09/24  9:41 AM   Result Value Ref Range    WBC Count 23.1 (H) 4.0 - 11.0 10e3/uL    RBC Count 3.19 (L) 4.40 - 5.90 10e6/uL    Hemoglobin 10.4 (L) 13.3 - 17.7 g/dL    Hematocrit 32.3 (L) 40.0 - 53.0 %     (H) 78 - 100 fL    MCH 32.6 26.5 - 33.0 pg    MCHC 32.2 31.5 - 36.5 g/dL    RDW 13.3 10.0 - 15.0 %    Platelet Count 412 150 - 450 10e3/uL    % Neutrophils 85 %    % Lymphocytes 5 %    % Monocytes 8 %    % Eosinophils 0 %    % Basophils 0 %    % Immature Granulocytes 2 %    NRBCs per 100 WBC 0 <1 /100    Absolute Neutrophils 19.7 (H) 1.6 - 8.3 10e3/uL    Absolute Lymphocytes 1.1 0.8 - 5.3 10e3/uL    Absolute Monocytes 1.8 (H) 0.0 - 1.3 10e3/uL    Absolute Eosinophils 0.0 0.0 - 0.7 10e3/uL    Absolute Basophils 0.1 0.0 - 0.2 10e3/uL    Absolute Immature Granulocytes 0.4 <=0.4 10e3/uL    Absolute NRBCs 0.0 10e3/uL   CK total    Collection Time: 02/09/24  9:41 AM   Result Value Ref Range    CK 72 39 - 308 U/L   RBC and Platelet Morphology    Collection Time: 02/09/24  9:41 AM   Result Value Ref Range    Platelet Assessment  Automated Count Confirmed. Platelet morphology is normal.     Automated Count Confirmed. Platelet morphology is normal.    Acanthocytes Slight (A) None Seen    Camille Rods      Basophilic Stippling      Bite Cells      Blister Cells      Wellpinit Cells      Elliptocytes      Hgb C Crystals      Overton-Jolly Bodies      Hypersegmented Neutrophils      Polychromasia      RBC agglutination      RBC Fragments      Reactive Lymphocytes      Rouleaux      Sickle Cells      Smudge Cells      Spherocytes      Stomatocytes      Target Cells      Teardrop Cells       Toxic Neutrophils      RBC Morphology Confirmed RBC Indices    Blood Culture Peripheral Blood    Collection Time: 02/09/24 10:22 AM    Specimen: Peripheral Blood   Result Value Ref Range    Culture No growth after 1 day    Blood Culture Peripheral Blood    Collection Time: 02/09/24 11:05 AM    Specimen: Peripheral Blood   Result Value Ref Range    Culture No growth after 1 day    Lactic acid whole blood    Collection Time: 02/09/24 11:11 AM   Result Value Ref Range    Lactic Acid 6.4 (HH) 0.7 - 2.0 mmol/L   Blood gas venous    Collection Time: 02/09/24 11:33 AM   Result Value Ref Range    pH Venous 7.03 (LL) 7.32 - 7.43    pCO2 Venous 37 (L) 40 - 50 mm Hg    pO2 Venous 53 (H) 25 - 47 mm Hg    Bicarbonate Venous 10 (LL) 21 - 28 mmol/L    Base Excess/Deficit Venous -21.4 (L) -3.0 - 3.0 mmol/L    FIO2 21     Oxyhemoglobin Venous 74 70 - 75 %    O2 Sat, Venous 74.3 70.0 - 75.0 %   Glucose by meter    Collection Time: 02/09/24 12:48 PM   Result Value Ref Range    GLUCOSE BY METER POCT 112 (H) 70 - 99 mg/dL   UA with Microscopic reflex to Culture    Collection Time: 02/09/24 12:49 PM    Specimen: Urine, Gilbert Catheter   Result Value Ref Range    Color Urine Yellow Colorless, Straw, Light Yellow, Yellow    Appearance Urine Cloudy (A) Clear    Glucose Urine Negative Negative mg/dL    Bilirubin Urine Negative Negative    Ketones Urine Negative Negative mg/dL    Specific Gravity Urine 1.012 1.001 - 1.030    Blood Urine 0.2 mg/dL (A) Negative    pH Urine 6.5 5.0 - 7.0    Protein Albumin Urine 300 (A) Negative mg/dL    Urobilinogen Urine <2.0 <2.0 mg/dL    Nitrite Urine Negative Negative    Leukocyte Esterase Urine 500 Michael/uL (A) Negative    Bacteria Urine Many (A) None Seen /HPF    WBC Clumps Urine Present (A) None Seen /HPF    Mucus Urine Present (A) None Seen /LPF    RBC Urine 89 (H) <=2 /HPF    WBC Urine >182 (H) <=5 /HPF   Protein  random urine    Collection Time: 02/09/24 12:49 PM   Result Value Ref Range    Total Protein  Urine mg/dL 305.0   mg/dL    Total Protein Urine mg/mg Creat 8.40 (H) 0.00 - 0.20 mg/mg Cr    Creatinine Urine mg/dL 36.3 mg/dL   Urine Culture    Collection Time: 02/09/24 12:49 PM    Specimen: Urine, Gilbert Catheter   Result Value Ref Range    Culture >100,000 CFU/mL Escherichia coli (A)        Susceptibility    Escherichia coli - JACK     Ampicillin <=2 Susceptible ug/mL     Ampicillin/ Sulbactam <=2 Susceptible ug/mL     Piperacillin/Tazobactam <=4 Susceptible ug/mL     Cefazolin* <=4 Susceptible ug/mL      * Cefazolin JACK breakpoints are for the treatment of uncomplicated urinary tract infections. For the treatment of systemic infections, please contact the laboratory for additional testing.     Cefoxitin <=4 Susceptible ug/mL     Ceftazidime <=1 Susceptible ug/mL     Ceftriaxone <=1 Susceptible ug/mL     Cefepime <=1 Susceptible ug/mL     Gentamicin <=1 Susceptible ug/mL     Tobramycin <=1 Susceptible ug/mL     Ciprofloxacin <=0.25 Susceptible ug/mL     Levofloxacin <=0.12 Susceptible ug/mL     Nitrofurantoin <=16 Susceptible ug/mL     Trimethoprim/Sulfamethoxazole <=1/19 Susceptible ug/mL   Potassium    Collection Time: 02/09/24  1:12 PM   Result Value Ref Range    Potassium 5.2 3.4 - 5.3 mmol/L   Glucose by meter    Collection Time: 02/09/24  1:42 PM   Result Value Ref Range    GLUCOSE BY METER POCT 119 (H) 70 - 99 mg/dL   Potassium    Collection Time: 02/09/24  2:22 PM   Result Value Ref Range    Potassium 5.1 3.4 - 5.3 mmol/L   Glucose by meter    Collection Time: 02/09/24  2:43 PM   Result Value Ref Range    GLUCOSE BY METER POCT 172 (H) 70 - 99 mg/dL   Glucose by meter    Collection Time: 02/09/24  3:08 PM   Result Value Ref Range    GLUCOSE BY METER POCT 183 (H) 70 - 99 mg/dL   Glucose by meter    Collection Time: 02/09/24  3:59 PM   Result Value Ref Range    GLUCOSE BY METER POCT 191 (H) 70 - 99 mg/dL   Glucose by meter    Collection Time: 02/09/24  5:28 PM   Result Value Ref Range    GLUCOSE BY METER  POCT 193 (H) 70 - 99 mg/dL   Glucose by meter    Collection Time: 02/09/24  6:15 PM   Result Value Ref Range    GLUCOSE BY METER POCT 186 (H) 70 - 99 mg/dL   Lactic acid whole blood    Collection Time: 02/09/24  6:43 PM   Result Value Ref Range    Lactic Acid 5.8 (HH) 0.7 - 2.0 mmol/L   Glucose by meter    Collection Time: 02/09/24  7:15 PM   Result Value Ref Range    GLUCOSE BY METER POCT 178 (H) 70 - 99 mg/dL   Glucose by meter    Collection Time: 02/09/24  9:53 PM   Result Value Ref Range    GLUCOSE BY METER POCT 192 (H) 70 - 99 mg/dL   Lactic acid whole blood    Collection Time: 02/10/24 12:09 AM   Result Value Ref Range    Lactic Acid 5.4 (HH) 0.7 - 2.0 mmol/L   Renal panel    Collection Time: 02/10/24  5:52 AM   Result Value Ref Range    Sodium 132 (L) 135 - 145 mmol/L    Potassium 4.5 3.4 - 5.3 mmol/L    Chloride 93 (L) 98 - 107 mmol/L    Carbon Dioxide (CO2) 23 22 - 29 mmol/L    Anion Gap 16 (H) 7 - 15 mmol/L    Glucose 171 (H) 70 - 99 mg/dL    Urea Nitrogen 47.5 (H) 8.0 - 23.0 mg/dL    Creatinine 4.05 (H) 0.67 - 1.17 mg/dL    GFR Estimate 14 (L) >60 mL/min/1.73m2    Calcium 8.1 (L) 8.8 - 10.2 mg/dL    Albumin 2.9 (L) 3.5 - 5.2 g/dL    Phosphorus 4.9 (H) 2.5 - 4.5 mg/dL   CBC with platelets    Collection Time: 02/10/24  5:52 AM   Result Value Ref Range    WBC Count 12.7 (H) 4.0 - 11.0 10e3/uL    RBC Count 2.37 (L) 4.40 - 5.90 10e6/uL    Hemoglobin 7.7 (L) 13.3 - 17.7 g/dL    Hematocrit 22.3 (L) 40.0 - 53.0 %    MCV 94 78 - 100 fL    MCH 32.5 26.5 - 33.0 pg    MCHC 34.5 31.5 - 36.5 g/dL    RDW 13.5 10.0 - 15.0 %    Platelet Count 320 150 - 450 10e3/uL   Vancomycin level    Collection Time: 02/10/24  5:52 AM   Result Value Ref Range    Vancomycin 10.8   ug/mL   Glucose by meter    Collection Time: 02/10/24  8:25 AM   Result Value Ref Range    GLUCOSE BY METER POCT 151 (H) 70 - 99 mg/dL   Glucose by meter    Collection Time: 02/10/24 12:45 PM   Result Value Ref Range    GLUCOSE BY METER POCT 189 (H) 70 - 99  mg/dL   Hepatitis B Surface Antibody    Collection Time: 02/10/24 12:49 PM   Result Value Ref Range    Hepatitis B Surface Antibody Nonreactive     Hepatitis B Surface Antibody Instrument Value <3.50 <8.5 m[IU]/mL   Hepatitis B surface antigen    Collection Time: 02/10/24 12:49 PM   Result Value Ref Range    Hepatitis B Surface Antigen Nonreactive Nonreactive   Glucose by meter    Collection Time: 02/10/24  6:09 PM   Result Value Ref Range    GLUCOSE BY METER POCT 150 (H) 70 - 99 mg/dL   Glucose by meter    Collection Time: 02/10/24  8:54 PM   Result Value Ref Range    GLUCOSE BY METER POCT 171 (H) 70 - 99 mg/dL   CBC with platelets    Collection Time: 02/11/24  4:47 AM   Result Value Ref Range    WBC Count 11.9 (H) 4.0 - 11.0 10e3/uL    RBC Count 2.63 (L) 4.40 - 5.90 10e6/uL    Hemoglobin 8.5 (L) 13.3 - 17.7 g/dL    Hematocrit 24.8 (L) 40.0 - 53.0 %    MCV 94 78 - 100 fL    MCH 32.3 26.5 - 33.0 pg    MCHC 34.3 31.5 - 36.5 g/dL    RDW 13.6 10.0 - 15.0 %    Platelet Count 370 150 - 450 10e3/uL   Renal panel    Collection Time: 02/11/24  4:47 AM   Result Value Ref Range    Sodium 133 (L) 135 - 145 mmol/L    Potassium 4.3 3.4 - 5.3 mmol/L    Chloride 94 (L) 98 - 107 mmol/L    Carbon Dioxide (CO2) 27 22 - 29 mmol/L    Anion Gap 12 7 - 15 mmol/L    Glucose 117 (H) 70 - 99 mg/dL    Urea Nitrogen 29.7 (H) 8.0 - 23.0 mg/dL    Creatinine 3.42 (H) 0.67 - 1.17 mg/dL    GFR Estimate 18 (L) >60 mL/min/1.73m2    Calcium 8.0 (L) 8.8 - 10.2 mg/dL    Albumin 2.8 (L) 3.5 - 5.2 g/dL    Phosphorus 4.1 2.5 - 4.5 mg/dL   Uric acid    Collection Time: 02/11/24  4:47 AM   Result Value Ref Range    Uric Acid 3.9 3.4 - 7.0 mg/dL   Glucose by meter    Collection Time: 02/11/24  7:58 AM   Result Value Ref Range    GLUCOSE BY METER POCT 129 (H) 70 - 99 mg/dL        MEDICATIONS:     Review of your medicines            Accurate as of February 9, 2024  9:04 AM. If you have any questions, ask your nurse or doctor.                CONTINUE these  medicines which have NOT CHANGED        Dose / Directions   acetaminophen 325 MG tablet  Commonly known as: TYLENOL      Dose: 650 mg  [ACETAMINOPHEN (TYLENOL) 325 MG TABLET] Take 650 mg by mouth every 4 (four) hours as needed for pain.  Refills: 0     amLODIPine 2.5 MG tablet  Commonly known as: NORVASC      Dose: 2.5 mg  Take 2.5 mg by mouth daily  Refills: 0     aspirin 81 MG EC tablet  Commonly known as: ASA      Dose: 81 mg  [ASPIRIN 81 MG EC TABLET] Take 81 mg by mouth daily.  Refills: 0     gabapentin 300 MG capsule  Commonly known as: NEURONTIN      Dose: 900 mg  Take 900 mg by mouth 2 times daily Take 3 capsules by mouth for a total of 900 mg two times per day.  Refills: 0     LANsoprazole 30 MG DR capsule  Commonly known as: PREVACID      Dose: 30 mg  Take 30 mg by mouth at bedtime  Refills: 0     polyethylene glycol 17 GM/Dose powder  Commonly known as: MIRALAX  Used for: Other constipation      Dose: 17 g  Take 17 g by mouth daily as needed for constipation  Quantity: 510 g  Refills: 0     PreserVision AREDS 2 Caps      Dose: 1 capsule  [VIT C,V-VR-RAOIG-LUTEIN-ZEAXAN (PRESERVISION AREDS-2) 250-90-40-1 MG CAP] Take 1 capsule by mouth 2 (two) times a day.  Refills: 0     simvastatin 20 MG tablet  Commonly known as: ZOCOR  Used for: Hyperlipidemia, unspecified hyperlipidemia type      Dose: 20 mg  Take 1 tablet (20 mg) by mouth at bedtime  Quantity: 90 tablet  Refills: 0              ASSESSMENT/PLAN  Encounter Diagnoses   Name Primary?     Elevated serum creatinine Yes     Hyponatremia      Nausea and vomiting, unspecified vomiting type      Elevated creatinine and potassium with a low sodium sent to the ER for further evaluation    Physical deconditioning PT OT    Falls will continue with therapy for strengthening    Pain management as needed Tylenol, gabapentin 900 mg twice daily,     Hypertension on Norvasc 2.5 mg daily, recently discontinued hydrochlorothiazide and valsartan due to elevated creatinine  and potassium    diabetes type 2 on metformin 1000 mg twice daily which was discontinued last night from on-call provider due to elevated creatinine    GERD on Prevacid HDL on simvastatin    Electronically signed by: Geneva Jaramillo CNP       Sincerely,        Geneva Jaramillo CNP

## 2024-02-09 NOTE — H&P
"[facility]    History and Physical - Hospitalist Service       Date of Admission:  2/9/2024    Assessment & Plan      Pa García is a 78 year old male admitted on 2/9/2024. He has a history of HTN, HLD, DM, atrial flutter not anticoagulated, stage III chronic kidney disease, partial paraplegia with neurogenic bowel and bladder as a complication of previous back surgery with right foot drop who was recently admitted to  2/1- 2/4/24 for mechanical fall and generalized weakness. He is admitted today for acute renal failure with hyperkalemia and high anion gap metabolic acidosis.       Severe Acute Kidney Injury   CKD Stage 3   Patient's baseline Cr 1.4-1.76 during recent hospitalization, Cr on admission 5.15.   - Admit to inpatient   - Nephrology consulted   - HD today 2/9 and again tomorrow 2/10   - Bladder scan to rule out urinary retention    - UA, UPCR, total CK   - complement C3/C4, ANCA, MIGUEL   - hold PTA Diovan and hydrochlorothiazide   - Monitor daily BMP and urinary output  - Avoid nephrotoxins, renally dose medications   - Strict I&O's, daily weights     Hyperkalemia   K+ of 6.3 on admission with peaked T waves and QRS widening on EKG. Received 2 g calcium gluconate in ED.   - insulin, dextrose, and 100 mg of sodium bicarb.   - sodium bicarb drip at 150mL/hr   - HD with K2 bath   - cardiac tele   - renal diet   - recheck Q2H    High anion gap metabolic acidosis   Lactic Acidosis   Leukocytosis   ? Community Acquired Pneumonia, Right Middle Lobe  Lactic acid of 6.1 on arrival, up to 6.4 on recheck. ABG with pH 7.03, CO2 37, bicarb 10. WBC elevated to 23. Concerning for metformin associated lactic acidosis (ALIYA). Patient has been taking metformin 1000 mg BID prior to admission, not renally dosed. Non-contract chest CT showing \"Clustered nodularity in the right middle lobe which is favored infectious/inflammatory.\" UA suspicious for infection, culture pending.   - Nephrology " consulted  - 10 mg of sodium bicarb   - Sodium bicarb drip at 150 mL/hr  - HD as above with bicarb bath of 32   - Discontinue metformin indefinitely and do NOT resume on discharge   - Empiric antibiotic coverage with vanc and zosyn    - pharmacy to renally dose vanc   - blood cultures and urine cultures pending   - follow up chest CT in 3 months to ensure resolution     Hyponatremia   Na 127 on admission. Should improve with dialysis.  - daily BMP     HTN   Hypertensive to 146/72 on admission, asymptomatic.   - HOLD PTA antihypertensives including diovan, hydrochlorothiazide and amlodipine   - Hydralazine 25 mg Q8H for SBP >160    Macrocytic Anemia   Hgb 10.4 on admission, hemoconcentration may be playing a role. No signs of active bleed.  - daily CBC    History of Atrial Flutter   New as of recent hospitalization. In normal sinus rhythm on admission. Patient had an echo 2/2/24 with normal LV function without wall motion abnormalities.  He would be a candidate for full anticoagulation but patient declined given history of recurrent falls. Plan to follow up with outpatient EP cardiology.  - cardiac tele     Type 2 DM   Most recent A1c of 6.7%. Blood glucose on admission 83.   - hold PTA metformin indefinitely   - monitor with daily BMP       Chronic Problems:  - HLD: continue PTA statin  - diabetic neuropathy: HOLD PTA Gabapentin             Diet: Combination Diet Regular Diet Adult; Renal Diet (dialysis)Renal Diet  DVT Prophylaxis: Pneumatic Compression Devices (Padua score =2)  Gilbert Catheter: PRESENT, indication: Strict 1-2 Hour I&O  Fluids: bicarb drip 150mL/hr  Lines: None     Cardiac Monitoring: None  Code Status: Full CodeFull Code    Clinically Significant Risk Factors Present on Admission        # Hyperkalemia: Highest K = 6.3 mmol/L in last 2 days, will monitor as appropriate  # Hyponatremia: Lowest Na = 127 mmol/L in last 2 days, will monitor as appropriate  # Hypocalcemia: Lowest Ca = 8.4 mg/dL in last 2  days, will monitor and replace as appropriate    # Anion Gap Metabolic Acidosis: Highest Anion Gap = 25 mmol/L in last 2 days, will monitor and treat as appropriate    # Drug Induced Platelet Defect: home medication list includes an antiplatelet medication    # Acute Kidney Injury, unspecified: based on a >150% or 0.3 mg/dL increase in last creatinine compared to past 90 day average, will monitor renal function  # Hypertension: Noted on problem list     # DMII: A1C = 6.7 % (Ref range: <5.7 %) within past 6 months        # Financial/Environmental Concerns:           Disposition Plan      Expected Discharge Date: 02/11/2024                The patient's care was discussed with the Attending Physician, Dr. Siegel, Bedside Nurse, and Patient.      Joann Leigh DO PGY2  Hospitalist Service  [facility]  Securely message with [employer] (more info)  Text page via [facility] Paging/Directory   ______________________________________________________________________    Chief Complaint   Nausea, vomiting, diarrhea     History is obtained from the patient    History of Present Illness   Pa García is a 78 year old male who has a history of HTN, HLD, DM, atrial flutter not anticoagulated, stage III chronic kidney disease, partial paraplegia with neurogenic bowel and bladder as a complication of previous back surgery with right foot drop and is admitted for acute kidney injury superimposed on CKD3.     Patient was recently hospitalized 2/1-2/4 following a mechanical fall. He was found to have an ROXIE and new onset atrial flutter during his stay. He was discharged to a TCU.    Since that time, he has been feeling well and in his usual state of health until he woke up this morning feeling nauseas and started having vomiting and diarrhea that have since resolved. No blood in vomit or stool. No recent lifestyle changes, travel, or known sick contacts.     Patient arrived to the ED via EMS and was found to have  severe kidney injury and high anion gap metabolic acidosis. Nephrology was consulted and patient was started on hemodialysis with K2 and bicarb bath of 32.               Past Medical History    No past medical history on file.    Past Surgical History   Past Surgical History:   Procedure Laterality Date    BACK SURGERY      lumbar laminectomy and fusion/decompression    LUMBAR LAMINECTOMY Bilateral 3/8/2016    Procedure: REVISION DECOMPRESSION BILATERAL L2-3;  Surgeon: Alexis Amado MD;  Location: [facility];  Service:     LUMBAR LAMINECTOMY  3/13/2016    Procedure: Posterior Laminectomy L1-L3 with Dural Repair, and Microscopic Discectomy;  Surgeon: Foster Rae MD;  Location: [facility];  Service:        Prior to Admission Medications   Prior to Admission Medications   Prescriptions Last Dose Informant Patient Reported? Taking?   acetaminophen (TYLENOL) 325 MG tablet More than a month at PRN  Yes Yes   Sig: [ACETAMINOPHEN (TYLENOL) 325 MG TABLET] Take 650 mg by mouth every 4 (four) hours as needed for pain.   amLODIPine (NORVASC) 2.5 MG tablet 2/8/2024 at am  Yes Yes   Sig: Take 2.5 mg by mouth daily   aspirin 81 MG EC tablet 2/8/2024 at am  Yes Yes   Sig: [ASPIRIN 81 MG EC TABLET] Take 81 mg by mouth daily.   gabapentin (NEURONTIN) 300 MG capsule 2/8/2024 at pm  Yes Yes   Sig: Take 300 mg by mouth 2 times daily   hydroCHLOROthiazide (HYDRODIURIL) 12.5 MG tablet 2/8/2024 at am  Yes Yes   Sig: Take 12.5 mg by mouth daily   lansoprazole (PREVACID) 30 MG capsule 2/8/2024 at pm  Yes Yes   Sig: Take 30 mg by mouth at bedtime   metFORMIN (GLUCOPHAGE) 1000 MG tablet 2/8/2024 at am  Yes Yes   Sig: Take 1,000 mg by mouth 2 times daily (with meals)   omega 3 1000 MG CAPS 2/8/2024 at am  Yes Yes   polyethylene glycol (MIRALAX) 17 GM/Dose powder More than a month at PRN  No Yes   Sig: Take 17 g by mouth daily as needed for constipation   simvastatin (ZOCOR) 20 MG tablet 2/8/2024 at AM  No Yes   Sig:  Take 1 tablet (20 mg) by mouth at bedtime   valsartan (DIOVAN) 160 MG tablet 2/8/2024 at am  Yes Yes   Sig: Take 160 mg by mouth daily   vit C,E-Jt-fkrww-lutein-zeaxan (PRESERVISION AREDS-2) 250-90-40-1 mg cap 2/8/2024 at PM  Yes Yes   Sig: [VIT C,Q-YR-PQFXL-LUTEIN-ZEAXAN (PRESERVISION AREDS-2) 250-90-40-1 MG CAP] Take 1 capsule by mouth 2 (two) times a day.      Facility-Administered Medications: None        Social History   I have reviewed this patient's social history and updated it with pertinent information if needed.  Social History     Tobacco Use    Smoking status: Never    Smokeless tobacco: Never   Substance Use Topics    Alcohol use: Yes     Comment: Alcoholic Drinks/day: occ    Drug use: No         Family History   I have reviewed this patient's family history and updated it with pertinent information if needed.  Family History   Problem Relation Age of Onset    Dementia Father     Cancer Maternal Grandmother     Cancer Maternal Grandfather     Cancer Paternal Grandmother     Cancer Paternal Grandfather          Allergies   Allergies   Allergen Reactions    Ezetimibe-Simvastatin GI Disturbance        Physical Exam   Vital Signs: Temp: 97.6  F (36.4  C) Temp src: Oral BP: 134/59 Pulse: 93   Resp: 14 SpO2: 91 % O2 Device: None (Room air)    Weight: 152 lbs 0 oz    Constitutional: awake, alert, cooperative, no apparent distress, and appears stated age  Eyes: Lids and lashes normal, extra ocular muscles intact, sclera clear, conjunctiva normal  ENT: normocepalic, without obvious abnormality  Respiratory: No increased work of breathing, good air exchange, clear to auscultation bilaterally, no crackles or wheezing  Cardiovascular: Regular rate and rhythm, normal S1 and S2, no murmur noted  GI: Soft, non-distended, non-tender, no masses palpated  Skin: normal skin color, texture, turgor  Musculoskeletal: no lower extremity pitting edema present      Medical Decision Making       Please see A&P for additional  details of medical decision making.      Data   ------------------------- PAST 24 HR DATA REVIEWED -----------------------------------------------    I have personally reviewed the following data over the past 24 hrs:    23.1 (H)  \   10.4 (L)   / 412     127 (L) 92 (L) 77.8 (H) /  172 (H)   5.1 10 (LL) 5.15 (H) \     ALT: 9 AST: 16 AP: 61 TBILI: 0.3   ALB: 3.7 TOT PROTEIN: 6.7 LIPASE: 9 (L)     Procal: N/A CRP: N/A Lactic Acid: 6.4 (HH)         Imaging results reviewed over the past 24 hrs:   Recent Results (from the past 24 hour(s))   XR Chest Port 1 View    Narrative    EXAM: XR CHEST PORT 1 VIEW  LOCATION: [facility]  DATE: 2/9/2024    INDICATION: post RIJ CVC  COMPARISON: Chest radiograph 12/01/2024.      Impression    IMPRESSION:     Right internal jugular approach central venous catheter tip is in the mid SVC approximately 6.5 cm above the superior cavoatrial junction.    Visualized portions of the lungs are clear. No visible pneumothorax or pleural fluid.    Stable cardiomediastinal silhouette.   CT Abdomen Pelvis w/o Contrast    Narrative    EXAM: CT ABDOMEN PELVIS W/O CONTRAST  LOCATION: [facility]  DATE: 2/9/2024    INDICATION: eval etiology poss obstructive uropathy  COMPARISON: CT pelvis 06/08/2021, chest radiograph 02/09/2024.  TECHNIQUE: CT scan of the abdomen and pelvis was performed without IV contrast. Multiplanar reformats were obtained. Dose reduction techniques were used.  CONTRAST: None.    FINDINGS:   LOWER CHEST: Clustered nodularity in the medial right middle lobe, favored infectious/inflammatory. Trace bilateral pleural thickening. Hypoattenuation of intracardiac blood pool suggesting anemia.    HEPATOBILIARY: Fatty infiltration adjacent to the falciform ligament. Gallbladder distention. No calcified gallstones or biliary dilatation.    PANCREAS: Atrophy of the pancreatic parenchyma with numerous parenchymal calcifications suggesting  sequela of chronic pancreatitis.    SPLEEN: Normal.    ADRENAL GLANDS: Normal.    KIDNEYS/BLADDER: No renal or ureteral calculi. No hydronephrosis. Redemonstrated layering calcifications in the urinary bladder, now adjacent to the left UVJ and measuring up to 1.1 x 0.5 cm. The urinary bladder is decompressed with a small amount of air   related to the internal Gilbert catheter.    BOWEL: No obstruction or inflammatory change.    LYMPH NODES: Normal.    VASCULATURE: Pelvic phleboliths. Moderate scattered vascular calcifications.    PELVIC ORGANS: Prostatomegaly. Small volume abdominopelvic ascites.    MUSCULOSKELETAL: Multilevel degenerative changes in the lumbar spine with interbody device at L2-L3, anterior instrumented fusion L4-L5 with interbody device, bilateral L4-L5 arthrodesis screws, and multilevel laminectomy.      Impression    IMPRESSION:   1.  Redemonstrated bladder calculi. No renal or ureteral calculi. No hydronephrosis.  2.  Clustered nodularity in the right middle lobe which is favored infectious/inflammatory. Recommend follow-up CT chest in 3 months to ensure resolution.  3.  Sequelae of chronic pancreatitis.  4.  Small volume abdominopelvic ascites.  5.  Extensive postoperative changes in the lumbar spine.  6.

## 2024-02-09 NOTE — TELEPHONE ENCOUNTER
"Called on TCU resident's abnormal labs:  Last Basic Metabolic Panel:  Lab Results   Component Value Date     (L) 02/08/2024    POTASSIUM 5.7 (H) 02/08/2024    CHLORIDE 99 02/08/2024    CO2 16 (L) 02/08/2024    ANIONGAP 16 (H) 02/08/2024     (H) 02/08/2024    BUN 70.3 (H) 02/08/2024    CR 4.08 (H) 02/08/2024    GFRESTIMATED 14 (L) 02/08/2024    JAYRO 8.4 (L) 02/08/2024       He has h/o CKD3 with baseline Cr about 1.5-1.7 range. Had elevated Cr the other day to 2.54 and K 5.7 and was given a dose of Kayexalate along with discontinuation of hydrochlorothiazide and valsartan with repeat labs planned today after encouraging oral fluids. As you see, Cr up higher to 4.08 and K still 5.7 and Na dropping now to 131. He has mild acidosis with elevated BUN too.    Spoke with RN, he is walking and eating but is very \"particular\" with his intake. Though they have water jug there he isn't necessarily drinking as much as they'd like though he doesn't look overtly dry either. VSS. No symptoms. Is full code. They don't have more Kayexalate on site in their Nexus. I asked about ability to check PVRs in case he had some obstructive nephropathy but unfortunately both of their bladder scanners are broken tonight.     Since uncertain exact cause of ROXIE on CKD, inability to check PVRs, inconsistent PO intake, continued electrolyte abnormalities with chance of K+ rising further, advised transfer to ER. Also noted his WBC was rising and Hgb dropping as of the other day. RN went to talk with him but he was content in bed watching TV and declined transfer to hospital even knowing risk of death with further rise of K+ pending trajectory. He agreed to drink more water and re-evaluate tomorrow. No urinary complaints. HOWEVER, RN says unable to get labs (BMP and CBC) tomorrow as would have had to have been placed earlier in the day though they can attempt to call for STAT labs in the morning especially knowing tomorrow is Friday going " into a weekend. They will try to refill Nexus with Kayexalate and check on getting bladder scanners working. They will discontinue Metformin given ROXIE. Continue to push fluids. He agreed to let them know if any new symptoms of concern.    I'm routing this to my colleagues with [facility] to please follow up with him tomorrow and see how he is doing and if labs able to be obtained or not. Thanks!  Gema Hernadez, DO  [facility]

## 2024-02-09 NOTE — MEDICATION SCRIBE - ADMISSION MEDICATION HISTORY
Medication Scribe Admission Medication History    Admission medication history is complete. The information provided in this note is only as accurate as the sources available at the time of the update.    Information Source(s): Patient via in-person    Pertinent Information: Simvastatin 40 mg dosage decreased to simvastatin 20 mg as noted by patient and dispense report. Per AVS from 2/1/2024 patient is taking 900 mg total (3 x 300 mg) of gabapentin twice daily.     Changes made to PTA medication list:  Added: hydrochlorothiazide 12.5 mg, metformin 1,000 mg, valsartan 160 mg, omega 3 supplement.   Deleted: Aquaphor   Changed: None    Allergies reviewed with patient and updates made in EHR: yes    Medication History Completed By: Kamron Obrien 2/9/2024 10:40 AM    Prior to Admission medications    Medication Sig Last Dose Taking? Auth Provider Long Term End Date   acetaminophen (TYLENOL) 325 MG tablet [ACETAMINOPHEN (TYLENOL) 325 MG TABLET] Take 650 mg by mouth every 4 (four) hours as needed for pain. More than a month at PRN Yes Provider, Historical     amLODIPine (NORVASC) 2.5 MG tablet Take 2.5 mg by mouth daily 2/8/2024 at am Yes Unknown, Entered By History Yes    aspirin 81 MG EC tablet [ASPIRIN 81 MG EC TABLET] Take 81 mg by mouth daily. 2/8/2024 at am Yes Provider, Historical     gabapentin (NEURONTIN) 300 MG capsule Take 300 mg by mouth 2 times daily 2/8/2024 at pm Yes Provider, Historical     hydroCHLOROthiazide (HYDRODIURIL) 12.5 MG tablet Take 12.5 mg by mouth daily 2/8/2024 at am Yes Reported, Patient No    lansoprazole (PREVACID) 30 MG capsule Take 30 mg by mouth at bedtime 2/8/2024 at pm Yes Provider, Historical     metFORMIN (GLUCOPHAGE) 1000 MG tablet Take 1,000 mg by mouth 2 times daily (with meals) 2/8/2024 at am Yes Reported, Patient No    omega 3 1000 MG CAPS  2/8/2024 at am Yes Reported, Patient     polyethylene glycol (MIRALAX) 17 GM/Dose powder Take 17 g by mouth daily as needed for  constipation More than a month at PRN Yes Eva Wood MD     simvastatin (ZOCOR) 20 MG tablet Take 1 tablet (20 mg) by mouth at bedtime 2/8/2024 at AM Yes Eva Wood MD Yes    valsartan (DIOVAN) 160 MG tablet Take 160 mg by mouth daily 2/8/2024 at am Yes Reported, Patient No    vit C,O-Ye-xjqrz-lutein-zeaxan (PRESERVISION AREDS-2) 250-90-40-1 mg cap [VIT C,L-BF-CQVTQ-LUTEIN-ZEAXAN (PRESERVISION AREDS-2) 250-90-40-1 MG CAP] Take 1 capsule by mouth 2 (two) times a day. 2/8/2024 at PM Yes Provider, Historical

## 2024-02-09 NOTE — PROGRESS NOTES
HEMODIALYSIS TREATMENT NOTE    Date: 2/9/2024  Time: 4.50 PM    Data:  Pre Wt: 68.9 kg    Desired Wt:  0 kg   Post Wt: 68.9kg (Estimated)   Weight change: 0  kg  Ultrafiltration - Post Run Net Total Removed (mL):  No UF  Vascular Access Status: CVC  patent  Dialyzer Rinse: Streaked. Light   Total Blood Volume Processed: 29.0L    Total Dialysis (Treatment) Time:  2.0 hours   Dialysate Bath: K 2, Ca 2.5  Heparin: None    Lab:   No    Interventions:  Pt had 2.0 hours HD via right internal jugular that was inserted today at  ED bedside with okay to use from the report.  During HD treatment HR in 90- 120 but pt asymptomatic.  SBP in 120's  - 150's, no issue.  No UF HD per prescription, Crit-line steady with A profile at the end of HD run  Pt completed his treatment time well, blood rinsed back,  right internal jugular heparin locked , clear guard applied & handoff report given     Assessment:  Pt alert & oriented x4, denies pain & no sign of SOB.  Monitoring every 15 minutes & PRN.  CVC dressing CDI     Plan:    Per Renal Team

## 2024-02-09 NOTE — MEDICATION SCRIBE - ADMISSION MEDICATION HISTORY
Medication Scribe Admission Medication History    Admission medication history is complete. The information provided in this note is only as accurate as the sources available at the time of the update.    Information Source(s): Facility ([facility]/) medication list/MAR via in-person. MAR faxed over from [facility].     Pertinent Information: Initially spoke with patient about medications. Updated patient medication list from facility MAR at 1547 on 2/9/2024 to reflect more accurate records. Verified with [facility] 1558 on 2/9/2024 that hydrochlorothiazide, valsartan, and metformin have been discontinued.     Changes made to PTA medication list:  Added: gabapentin 300 mg  Deleted: hydrochlorothiazide, metformin, valsartan  Changed: None    Allergies reviewed with patient and updates made in EHR: yes    Medication History Completed By: Kamron Obrien 2/9/2024 3:45 PM    Prior to Admission medications    Medication Sig Last Dose Taking? Auth Provider Long Term End Date   acetaminophen (TYLENOL) 325 MG tablet [ACETAMINOPHEN (TYLENOL) 325 MG TABLET] Take 650 mg by mouth every 4 (four) hours as needed for pain. 2/8/2024 at 2000 Yes Provider, Historical     amLODIPine (NORVASC) 2.5 MG tablet Take 2.5 mg by mouth daily 2/8/2024 at 0700 Yes Unknown, Entered By History Yes    aspirin 81 MG EC tablet [ASPIRIN 81 MG EC TABLET] Take 81 mg by mouth daily. 2/8/2024 at 0700 Yes Provider, Historical     gabapentin (NEURONTIN) 300 MG capsule Take 900 mg by mouth 2 times daily Take 3 capsules by mouth for a total of 900 mg two times per day. 2/8/2024 at 2000 Yes Reported, Patient     lansoprazole (PREVACID) 30 MG capsule Take 30 mg by mouth at bedtime 2/8/2024 at 2100 Yes Provider, Historical     polyethylene glycol (MIRALAX) 17 GM/Dose powder Take 17 g by mouth daily as needed for constipation Past Week at 2/3/2024 Yes Eva Wood MD     simvastatin (ZOCOR) 20 MG tablet Take 1 tablet (20 mg) by mouth at bedtime  2/8/2024 at 2100 Yes Eva Wood MD Yes    vit C,X-Pz-ehjtw-lutein-zeaxan (PRESERVISION AREDS-2) 250-90-40-1 mg cap [VIT C,Z-LP-HTSKG-LUTEIN-ZEAXAN (PRESERVISION AREDS-2) 250-90-40-1 MG CAP] Take 1 capsule by mouth 2 (two) times a day. 2/8/2024 at 2000 Yes Provider, Historical

## 2024-02-09 NOTE — ED NOTES
Bed: [facility]14  Expected date: 2/9/24  Expected time: 9:01 AM  Means of arrival: Ambulance  Comments:  Smooth N/DOROTHY/D

## 2024-02-09 NOTE — ED TRIAGE NOTES
Pt presents via EMS from [facility] due to nausea, vomiting, and diarrhea beginning this morning. No blood in either and denies pain. Also has increased creatinine.      Triage Assessment (Adult)       Row Name 02/09/24 0908          Triage Assessment    Airway WDL WDL        Respiratory WDL    Respiratory WDL WDL        Skin Circulation/Temperature WDL    Skin Circulation/Temperature WDL WDL        Cardiac WDL    Cardiac WDL WDL        Peripheral/Neurovascular WDL    Peripheral Neurovascular WDL WDL        Cognitive/Neuro/Behavioral WDL    Cognitive/Neuro/Behavioral WDL WDL

## 2024-02-10 LAB
ALBUMIN SERPL BCG-MCNC: 2.9 G/DL (ref 3.5–5.2)
ANION GAP SERPL CALCULATED.3IONS-SCNC: 16 MMOL/L (ref 7–15)
BUN SERPL-MCNC: 47.5 MG/DL (ref 8–23)
CALCIUM SERPL-MCNC: 8.1 MG/DL (ref 8.8–10.2)
CHLORIDE SERPL-SCNC: 93 MMOL/L (ref 98–107)
CREAT SERPL-MCNC: 4.05 MG/DL (ref 0.67–1.17)
DEPRECATED HCO3 PLAS-SCNC: 23 MMOL/L (ref 22–29)
EGFRCR SERPLBLD CKD-EPI 2021: 14 ML/MIN/1.73M2
ERYTHROCYTE [DISTWIDTH] IN BLOOD BY AUTOMATED COUNT: 13.5 % (ref 10–15)
GLUCOSE BLDC GLUCOMTR-MCNC: 150 MG/DL (ref 70–99)
GLUCOSE BLDC GLUCOMTR-MCNC: 151 MG/DL (ref 70–99)
GLUCOSE BLDC GLUCOMTR-MCNC: 171 MG/DL (ref 70–99)
GLUCOSE BLDC GLUCOMTR-MCNC: 189 MG/DL (ref 70–99)
GLUCOSE SERPL-MCNC: 171 MG/DL (ref 70–99)
HCT VFR BLD AUTO: 22.3 % (ref 40–53)
HGB BLD-MCNC: 7.7 G/DL (ref 13.3–17.7)
LACTATE SERPL-SCNC: 5.4 MMOL/L (ref 0.7–2)
MCH RBC QN AUTO: 32.5 PG (ref 26.5–33)
MCHC RBC AUTO-ENTMCNC: 34.5 G/DL (ref 31.5–36.5)
MCV RBC AUTO: 94 FL (ref 78–100)
PHOSPHATE SERPL-MCNC: 4.9 MG/DL (ref 2.5–4.5)
PLATELET # BLD AUTO: 320 10E3/UL (ref 150–450)
POTASSIUM SERPL-SCNC: 4.5 MMOL/L (ref 3.4–5.3)
RBC # BLD AUTO: 2.37 10E6/UL (ref 4.4–5.9)
SODIUM SERPL-SCNC: 132 MMOL/L (ref 135–145)
VANCOMYCIN SERPL-MCNC: 10.8 UG/ML
WBC # BLD AUTO: 12.7 10E3/UL (ref 4–11)

## 2024-02-10 PROCEDURE — 85027 COMPLETE CBC AUTOMATED: CPT

## 2024-02-10 PROCEDURE — 84165 PROTEIN E-PHORESIS SERUM: CPT | Mod: TC | Performed by: PATHOLOGY

## 2024-02-10 PROCEDURE — 83605 ASSAY OF LACTIC ACID: CPT | Performed by: SURGERY

## 2024-02-10 PROCEDURE — 258N000003 HC RX IP 258 OP 636: Performed by: EMERGENCY MEDICINE

## 2024-02-10 PROCEDURE — 250N000011 HC RX IP 250 OP 636: Performed by: FAMILY MEDICINE

## 2024-02-10 PROCEDURE — 86706 HEP B SURFACE ANTIBODY: CPT | Performed by: INTERNAL MEDICINE

## 2024-02-10 PROCEDURE — 250N000013 HC RX MED GY IP 250 OP 250 PS 637

## 2024-02-10 PROCEDURE — 250N000009 HC RX 250: Performed by: EMERGENCY MEDICINE

## 2024-02-10 PROCEDURE — 84155 ASSAY OF PROTEIN SERUM: CPT | Performed by: INTERNAL MEDICINE

## 2024-02-10 PROCEDURE — 83521 IG LIGHT CHAINS FREE EACH: CPT | Performed by: PATHOLOGY

## 2024-02-10 PROCEDURE — 250N000011 HC RX IP 250 OP 636: Performed by: INTERNAL MEDICINE

## 2024-02-10 PROCEDURE — 36415 COLL VENOUS BLD VENIPUNCTURE: CPT

## 2024-02-10 PROCEDURE — 36415 COLL VENOUS BLD VENIPUNCTURE: CPT | Performed by: SURGERY

## 2024-02-10 PROCEDURE — 120N000004 HC R&B MS OVERFLOW

## 2024-02-10 PROCEDURE — 258N000003 HC RX IP 258 OP 636: Performed by: INTERNAL MEDICINE

## 2024-02-10 PROCEDURE — 80202 ASSAY OF VANCOMYCIN: CPT | Performed by: FAMILY MEDICINE

## 2024-02-10 PROCEDURE — 86704 HEP B CORE ANTIBODY TOTAL: CPT | Performed by: PATHOLOGY

## 2024-02-10 PROCEDURE — 86334 IMMUNOFIX E-PHORESIS SERUM: CPT | Performed by: PATHOLOGY

## 2024-02-10 PROCEDURE — 87340 HEPATITIS B SURFACE AG IA: CPT | Performed by: INTERNAL MEDICINE

## 2024-02-10 PROCEDURE — 90935 HEMODIALYSIS ONE EVALUATION: CPT | Performed by: INTERNAL MEDICINE

## 2024-02-10 PROCEDURE — 80069 RENAL FUNCTION PANEL: CPT

## 2024-02-10 PROCEDURE — 99233 SBSQ HOSP IP/OBS HIGH 50: CPT | Mod: GC

## 2024-02-10 RX ORDER — ALBUMIN (HUMAN) 12.5 G/50ML
50 SOLUTION INTRAVENOUS
Status: DISCONTINUED | OUTPATIENT
Start: 2024-02-10 | End: 2024-02-14 | Stop reason: HOSPADM

## 2024-02-10 RX ADMIN — BENZOCAINE AND MENTHOL 1 LOZENGE: 15; 3.6 LOZENGE ORAL at 00:12

## 2024-02-10 RX ADMIN — HEPARIN SODIUM 1300 UNITS: 1000 INJECTION INTRAVENOUS; SUBCUTANEOUS at 13:18

## 2024-02-10 RX ADMIN — PIPERACILLIN AND TAZOBACTAM 3.38 G: 3; .375 INJECTION, POWDER, FOR SOLUTION INTRAVENOUS at 06:16

## 2024-02-10 RX ADMIN — SIMVASTATIN 20 MG: 20 TABLET, FILM COATED ORAL at 21:43

## 2024-02-10 RX ADMIN — PIPERACILLIN AND TAZOBACTAM 3.38 G: 3; .375 INJECTION, POWDER, FOR SOLUTION INTRAVENOUS at 19:49

## 2024-02-10 RX ADMIN — SODIUM BICARBONATE: 84 INJECTION, SOLUTION INTRAVENOUS at 08:08

## 2024-02-10 RX ADMIN — HEPARIN SODIUM 1300 UNITS: 1000 INJECTION INTRAVENOUS; SUBCUTANEOUS at 13:17

## 2024-02-10 RX ADMIN — SODIUM BICARBONATE: 84 INJECTION, SOLUTION INTRAVENOUS at 02:05

## 2024-02-10 RX ADMIN — Medication: at 13:15

## 2024-02-10 RX ADMIN — SODIUM CHLORIDE 300 ML: 9 INJECTION, SOLUTION INTRAVENOUS at 13:16

## 2024-02-10 RX ADMIN — SODIUM CHLORIDE 250 ML: 9 INJECTION, SOLUTION INTRAVENOUS at 13:16

## 2024-02-10 ASSESSMENT — ACTIVITIES OF DAILY LIVING (ADL)
ADLS_ACUITY_SCORE: 45
ADLS_ACUITY_SCORE: 44
ADLS_ACUITY_SCORE: 44
ADLS_ACUITY_SCORE: 50
ADLS_ACUITY_SCORE: 45
ADLS_ACUITY_SCORE: 50
ADLS_ACUITY_SCORE: 45
ADLS_ACUITY_SCORE: 45
ADLS_ACUITY_SCORE: 44
ADLS_ACUITY_SCORE: 50

## 2024-02-10 NOTE — PROGRESS NOTES
[facility]  [facility]   Follow up    Pa García   MRN: [id number]  : 1945   DOA: 2024   CC: ARF      Assessment and Plan:  78 year old male    ARF/CKD; has underlying CKD with CR 1.7 in July and CR running between 1.4 and 1.8 during  recent; hospitalization--noted to be rising on  and further to 5.2 yesterday; started on dialysis and this is second run today; etiology of both ARF/subacute/ CKD is unclear (maybe some dehydration at different points but seems out of proportion to degree and acuity of ARF); serology pending; d/w pt may recommend biopsy for diagnosis; plan next HD on Monday and monitoring for any renal recovery  DM; on insulin; metforin on hold  Hyperlipid; statin  Anemia; following hgb  A flutter; on chronic a/C  Acidosis; resolved with bicarb gtt and HD; thought related to metformin (elevated lactaate) and ARF; off metformin  Hyperkalemia; resolved with HD  Hyponatremia: follow with HD  HTN; on norvasc; bp ok; avoid HoTN  Volume status; seems ok so not doing UF with HD; ?was dehydrated at some point prior to admiit           Subjective  Pt new to me; chart and meds reviewed  Chart and meds reviewed; communicated with my partner Dr. David vasquez; pt  Pt feelling a little less mentally foggy; no nausea  Seen on second HD; tolerating  Objective    Vital signs in last 24 hours  Temp:  [97.5  F (36.4  C)-98.7  F (37.1  C)] 97.8  F (36.6  C)  Pulse:  [] 66  Resp:  [10-30] 21  BP: ()/(51-89) 135/62  SpO2:  [90 %-98 %] 96 %      Intake/Output last 3 shifts  I/O last 3 completed shifts:  In: 3397.5 [P.O.:470; I.V.:2927.5]  Out: 290 [Urine:290]  Intake/Output this shift:  I/O this shift:  In: 800 [P.O.:400; I.V.:400]  Out: 10 [Urine:10]    Physical Exam  Alert/oriented x 3, awake, NAD  CV: RRR without murmur or rub  Lung: clear and equal; no extra sounds  Ab: soft and NT; not distended; normal bs  Ext: no edema and well  perfused  Skin; no rash    Pertinent Labs     Last Renal Panel:  Sodium   Date Value Ref Range Status   02/10/2024 132 (L) 135 - 145 mmol/L Final     Comment:     Reference intervals for this test were updated on 09/26/2023 to more accurately reflect our healthy population. There may be differences in the flagging of prior results with similar values performed with this method. Interpretation of those prior results can be made in the context of the updated reference intervals.      Potassium   Date Value Ref Range Status   02/10/2024 4.5 3.4 - 5.3 mmol/L Final   06/10/2021 3.9 3.5 - 5.0 mmol/L Final     Chloride   Date Value Ref Range Status   02/10/2024 93 (L) 98 - 107 mmol/L Final   06/10/2021 114 (H) 98 - 107 mmol/L Final     Carbon Dioxide (CO2)   Date Value Ref Range Status   02/10/2024 23 22 - 29 mmol/L Final   06/10/2021 23 22 - 31 mmol/L Final     Anion Gap   Date Value Ref Range Status   02/10/2024 16 (H) 7 - 15 mmol/L Final   06/10/2021 5 5 - 18 mmol/L Final     Glucose   Date Value Ref Range Status   06/10/2021 131 (H) 70 - 125 mg/dL Final     GLUCOSE BY METER POCT   Date Value Ref Range Status   02/10/2024 189 (H) 70 - 99 mg/dL Final     Urea Nitrogen   Date Value Ref Range Status   02/10/2024 47.5 (H) 8.0 - 23.0 mg/dL Final   06/10/2021 22 8 - 28 mg/dL Final     Creatinine   Date Value Ref Range Status   02/10/2024 4.05 (H) 0.67 - 1.17 mg/dL Final     GFR Estimate   Date Value Ref Range Status   02/10/2024 14 (L) >60 mL/min/1.73m2 Final   06/10/2021 >60 >60 mL/min/1.73m2 Final     Calcium   Date Value Ref Range Status   02/10/2024 8.1 (L) 8.8 - 10.2 mg/dL Final     Phosphorus   Date Value Ref Range Status   02/10/2024 4.9 (H) 2.5 - 4.5 mg/dL Final     Albumin   Date Value Ref Range Status   02/10/2024 2.9 (L) 3.5 - 5.2 g/dL Final     Recent Labs   Lab 02/10/24  0552 02/09/24  0941 02/06/24  0630   HGB 7.7* 10.4* 8.6*          I reviewed all lab results  Maddy Barksdale MD

## 2024-02-10 NOTE — PHARMACY-VANCOMYCIN DOSING SERVICE
"Pharmacy Vancomycin Note  Date of Service February 10, 2024  Patient's  1945   78 year old, male    Indication: Community Acquired Pneumonia  Day of Therapy: 2  Current vancomycin regimen:  intermittent  Current vancomycin monitoring method: Renal Replacement Therapy  Current vancomycin therapeutic monitoring goal: 15-20 mg/L    Current estimated CrCl = Estimated Creatinine Clearance: 14.8 mL/min (A) (based on SCr of 4.05 mg/dL (H)).    Creatinine for last 3 days  2024:  6:05 AM Creatinine 4.08 mg/dL  2024:  9:41 AM Creatinine 5.15 mg/dL  2/10/2024:  5:52 AM Creatinine 4.05 mg/dL    Recent Vancomycin Levels (past 3 days)  2/10/2024:  5:52 AM Vancomycin 10.8 ug/mL    Vancomycin IV Administrations (past 72 hours)                     vancomycin (VANCOCIN) 1,500 mg in 0.9% NaCl 250 mL intermittent infusion (mg) 1,500 mg New Bag 24 1143                    Nephrotoxins and other renal medications (From now, onward)      Start     Dose/Rate Route Frequency Ordered Stop    02/10/24 1600  vancomycin (VANCOCIN) 1,250 mg in 0.9% NaCl 250 mL intermittent infusion         1,250 mg  over 90 Minutes Intravenous ONCE 02/10/24 0811      24 1800  piperacillin-tazobactam (ZOSYN) 3.375 g vial to attach to  mL bag        Note to Pharmacy: For [facility], [facility] and [facility]: For Zosyn-naive patients, use the \"Zosyn initial dose + extended infusion\" order panel.    3.375 g  over 240 Minutes Intravenous EVERY 12 HOURS 24 1451      24 1547  vancomycin place brown - receiving intermittent dosing         1 each Intravenous SEE ADMIN INSTRUCTIONS 24 1547                 Contrast Orders - past 72 hours (72h ago, onward)      None            Interpretation of levels and current regimen:  Vancomycin level is reflective of subtherapeutic level    Has serum creatinine changed greater than 50% in last 72 hours: ROXIE--intermittent HD    Urine output:  unable to determine    Renal Function: ARF on " Dialysis    Pre-dose level 10.8.   Will give ~18 mg/kg x1 after HD today    Plan:  Vancomycin 1250 mg x1 after HD today  Vancomycin monitoring method: Renal Replacement Therapy  Vancomycin therapeutic monitoring goal: 15-20 mg/L  Pharmacy will check vancomycin levels as appropriate in 1-3 Days.  Serum creatinine levels will be ordered daily for the first week of therapy and at least twice weekly for subsequent weeks.    Gema Cuevas [facility]

## 2024-02-10 NOTE — PROGRESS NOTES
[facility]    Progress Note - Hospitalist Service       Date of Admission:  2/9/2024    Assessment & Plan   Pa García is a 78 year old male admitted on 2/9/2024. He has a history of HTN, HLD, DM, atrial flutter not anticoagulated, stage III chronic kidney disease, partial paraplegia with neurogenic bowel and bladder as a complication of previous back surgery with right foot drop who was recently admitted to  2/1- 2/4/24 for mechanical fall and generalized weakness. He is admitted for acute renal failure with hyperkalemia and high anion gap metabolic acidosis. S/p HD on 2/10 with HD scheduled again for today 2/11.         Severe Acute Kidney Injury   CKD Stage 3   Patient's baseline Cr 1.4-1.76 during recent hospitalization, Cr on admission 5.15. Improved to 4.5 s/p HD. Nephrology team following, most likely etiology metformin associated lactic acidosis (ALIYA). Patient has been taking metformin 1000 mg BID prior to admission, not renally dosed.  - Admit to inpatient   - Nephrology consulted              - HD today 2/9 and again tomorrow 2/10              - Bladder scan to rule out urinary retention               - UA, UPCR, total CK              - complement C3/C4, ANCA, MIGUEL              - hold PTA antihypertensives  - Monitor daily BMP and urinary output  - Avoid nephrotoxins, renally dose medications   - Strict I&O's, daily weights      Hyperkalemia   K+ of 6.3 on admission with peaked T waves and QRS widening on EKG. Received 2 g calcium gluconate in ED.   - insulin, dextrose, and 100 mg of sodium bicarb  - sodium bicarb drip at 150mL/hr   - HD with K2 bath   - cardiac tele   - renal diet      High anion gap metabolic acidosis   Lactic Acidosis   Leukocytosis   ? Community Acquired Pneumonia, Right Middle Lobe  Urinary Tract infection, E. Coli   Lactic acid of 6.4 on arrival, down to 5.4 on recheck. ABG with pH 7.03, CO2 37, bicarb 10. WBC elevated to 23 on arrival, down to 12.7  "today. Receiving IV antibiotics. Concerning for metformin associated lactic acidosis (ALIYA). Patient has been taking metformin 1000 mg BID prior to admission, not renally dosed. Non-contract chest CT showing \"Clustered nodularity in the right middle lobe which is favored infectious/inflammatory.\" UA suspicious for infection, culture growing >10k CFU's E. Coli, susceptibilities pending.   - Nephrology consulted  - s/p 10 mg of sodium bicarb   - Sodium bicarb drip at 150 mL/hr  - HD as above with bicarb bath of 32   - Discontinue metformin indefinitely and do NOT resume on discharge   - narrow antibiotic coverage to zosyn in light of culture results, susceptibilities pending   - blood cultures pending, NGTD   - follow up chest CT in 3 months to ensure resolution      Hyponatremia   Na 127 on admission. Improved to 132 with dialysis.  - daily BMP      HTN   Normotensive today, asymptomatic.   - HOLD PTA antihypertensives including diovan, hydrochlorothiazide and amlodipine   - Hydralazine 25 mg Q8H for SBP >160     Macrocytic Anemia   Hgb 10.4 on admission likely related to hemoconcentration, 7.7 s/p HD.     hemoconcentration may be playing a role. No signs of active bleed.  - daily CBC     Atrial Flutter   New as of recent hospitalization. In A flutter today. Patient had an echo 2/2/24 with normal LV function without wall motion abnormalities.  He would be a candidate for full anticoagulation but patient declined given history of recurrent falls. Plan to follow up with outpatient EP cardiology.  - cardiac tele      Type 2 DM   Most recent A1c of 6.7%. Blood glucose on admission 83.   - hold PTA metformin indefinitely   - medium sliding scale insulin   - monitor with daily BMP         Chronic Problems:  - HLD: continue PTA statin  - diabetic neuropathy: HOLD PTA Gabapentin            Diet: Combination Diet Regular Diet Adult; Renal Diet (dialysis)    DVT Prophylaxis: Pneumatic Compression Devices  Gilbert Catheter: " PRESENT, indication: Strict 1-2 Hour I&O  Fluids: bicarb drip at 150ml/hr   Lines: None     Cardiac Monitoring: None  Code Status: Full Code      Clinically Significant Risk Factors Present on Admission        # Hyperkalemia: Highest K = 6.3 mmol/L in last 2 days, will monitor as appropriate  # Hyponatremia: Lowest Na = 127 mmol/L in last 2 days, will monitor as appropriate     # Anion Gap Metabolic Acidosis: Highest Anion Gap = 25 mmol/L in last 2 days, will monitor and treat as appropriate  # Hypoalbuminemia: Lowest albumin = 2.9 g/dL at 2/10/2024  5:52 AM, will monitor as appropriate   # Drug Induced Platelet Defect: home medication list includes an antiplatelet medication   # Hypertension: Noted on problem list     # DMII: A1C = 6.7 % (Ref range: <5.7 %) within past 6 months        # Financial/Environmental Concerns:           Disposition Plan      Expected Discharge Date: 02/12/2024        Discharge Comments: HD, WOC 2/12.  Back to [facility] at discharge?        The patient's care was discussed with the Attending Physician, Dr. Siegel, Bedside Nurse, and Patient.    Joann Leigh DO PGY2  Hospitalist Service  [facility]  Securely message with [employer] (more info)  Text page via [employer] Paging/Directory   ______________________________________________________________________    Interval History   No acute overnight events. Patient remains largely asymptomatic. Nausea, vomiting, and diarrhea remains resolved at this time. States dialysis went well and slept fine overnight. No pain.     Physical Exam   Vital Signs: Temp: 98  F (36.7  C) Temp src: Axillary BP: 116/56 Pulse: 76   Resp: 14 SpO2: 96 % O2 Device: Nasal cannula Oxygen Delivery: 1 LPM  Weight: 152 lbs 15.99 oz  Constitutional: awake, alert, cooperative, no apparent distress, and appears stated age  Eyes: Lids and lashes normal, extra ocular muscles intact, sclera clear, conjunctiva normal  ENT: normocepalic, without obvious  abnormality  Respiratory: No increased work of breathing, good air exchange, clear to auscultation bilaterally, no crackles or wheezing  Cardiovascular: Irregular heart rhythm, A flutter on bedside cardiac tele   GI: Soft, non-distended, non-tender, no masses palpated  Skin: normal skin color, texture, turgor  Musculoskeletal: no lower extremity pitting edema present      Medical Decision Making       Please see A&P for additional details of medical decision making.      Data   ------------------------- PAST 24 HR DATA REVIEWED -----------------------------------------------    I have personally reviewed the following data over the past 24 hrs:    12.7 (H)  \   7.7 (L)   / 320     132 (L) 93 (L) 47.5 (H) /  151 (H)   4.5 23 4.05 (H) \     ALT: N/A AST: N/A AP: N/A TBILI: N/A   ALB: 2.9 (L) TOT PROTEIN: N/A LIPASE: N/A     Procal: N/A CRP: N/A Lactic Acid: 5.4 (HH)         Imaging results reviewed over the past 24 hrs:   Recent Results (from the past 24 hour(s))   XR Chest Port 1 View    Narrative    EXAM: XR CHEST PORT 1 VIEW  LOCATION: [facility]  DATE: 2/9/2024    INDICATION: post RIJ CVC  COMPARISON: Chest radiograph 12/01/2024.      Impression    IMPRESSION:     Right internal jugular approach central venous catheter tip is in the mid SVC approximately 6.5 cm above the superior cavoatrial junction.    Visualized portions of the lungs are clear. No visible pneumothorax or pleural fluid.    Stable cardiomediastinal silhouette.   CT Abdomen Pelvis w/o Contrast    Narrative    EXAM: CT ABDOMEN PELVIS W/O CONTRAST  LOCATION: [facility]  DATE: 2/9/2024    INDICATION: eval etiology poss obstructive uropathy  COMPARISON: CT pelvis 06/08/2021, chest radiograph 02/09/2024.  TECHNIQUE: CT scan of the abdomen and pelvis was performed without IV contrast. Multiplanar reformats were obtained. Dose reduction techniques were used.  CONTRAST: None.    FINDINGS:   LOWER CHEST:  Clustered nodularity in the medial right middle lobe, favored infectious/inflammatory. Trace bilateral pleural thickening. Hypoattenuation of intracardiac blood pool suggesting anemia.    HEPATOBILIARY: Fatty infiltration adjacent to the falciform ligament. Gallbladder distention. No calcified gallstones or biliary dilatation.    PANCREAS: Atrophy of the pancreatic parenchyma with numerous parenchymal calcifications suggesting sequela of chronic pancreatitis.    SPLEEN: Normal.    ADRENAL GLANDS: Normal.    KIDNEYS/BLADDER: No renal or ureteral calculi. No hydronephrosis. Redemonstrated layering calcifications in the urinary bladder, now adjacent to the left UVJ and measuring up to 1.1 x 0.5 cm. The urinary bladder is decompressed with a small amount of air   related to the internal Gilbert catheter.    BOWEL: No obstruction or inflammatory change.    LYMPH NODES: Normal.    VASCULATURE: Pelvic phleboliths. Moderate scattered vascular calcifications.    PELVIC ORGANS: Prostatomegaly. Small volume abdominopelvic ascites.    MUSCULOSKELETAL: Multilevel degenerative changes in the lumbar spine with interbody device at L2-L3, anterior instrumented fusion L4-L5 with interbody device, bilateral L4-L5 arthrodesis screws, and multilevel laminectomy.      Impression    IMPRESSION:   1.  Redemonstrated bladder calculi. No renal or ureteral calculi. No hydronephrosis.  2.  Clustered nodularity in the right middle lobe which is favored infectious/inflammatory. Recommend follow-up CT chest in 3 months to ensure resolution.  3.  Sequelae of chronic pancreatitis.  4.  Small volume abdominopelvic ascites.  5.  Extensive postoperative changes in the lumbar spine.  6.

## 2024-02-10 NOTE — PROGRESS NOTES
Pt urine pink/red/tan color--reported to MD. No new orders but continue to monitor. Lactic recheck 5.2, trending down. Reported to MD, will plan to recheck in AM. 2RN skin assessment completed with Staci Luis RN. [facility] consult requested.     Vickie Joe RN

## 2024-02-10 NOTE — PLAN OF CARE
Goal Outcome Evaluation:      Plan of Care Reviewed With: patient     Patient neurologically intact however hard of hearing. Hearing aids in use and also wears glasses. Baseline, right foot drop from previous back surgery. Patient normally wears orthodic at home but does not have this available at hospital. A. Fib with heart rate between 70-80s. On room air until about 0500, currently on 1 liter oxygen via nasal cannula. Renal diet. Blood sugar checks AC and HS. Patient was having diarrhea on 02/09 but did not have any issues overnight. Patient has a history of neurogenic bowel and bladder. Patient has avila catheter in place; low urine output because he is a dialysis patient. Sodium Bicarb running at 150. Plan is for patient to have HD run today. Will continue to monitor and assess.

## 2024-02-11 ENCOUNTER — APPOINTMENT (OUTPATIENT)
Dept: OCCUPATIONAL THERAPY | Facility: CLINIC | Age: 79
DRG: 682 | End: 2024-02-11
Payer: COMMERCIAL

## 2024-02-11 ENCOUNTER — APPOINTMENT (OUTPATIENT)
Dept: PHYSICAL THERAPY | Facility: CLINIC | Age: 79
DRG: 682 | End: 2024-02-11
Payer: COMMERCIAL

## 2024-02-11 LAB
ALBUMIN SERPL BCG-MCNC: 2.8 G/DL (ref 3.5–5.2)
ANION GAP SERPL CALCULATED.3IONS-SCNC: 12 MMOL/L (ref 7–15)
BACTERIA UR CULT: ABNORMAL
BUN SERPL-MCNC: 29.7 MG/DL (ref 8–23)
CALCIUM SERPL-MCNC: 8 MG/DL (ref 8.8–10.2)
CHLORIDE SERPL-SCNC: 94 MMOL/L (ref 98–107)
CREAT SERPL-MCNC: 3.42 MG/DL (ref 0.67–1.17)
DEPRECATED HCO3 PLAS-SCNC: 27 MMOL/L (ref 22–29)
EGFRCR SERPLBLD CKD-EPI 2021: 18 ML/MIN/1.73M2
ERYTHROCYTE [DISTWIDTH] IN BLOOD BY AUTOMATED COUNT: 13.6 % (ref 10–15)
GLUCOSE BLDC GLUCOMTR-MCNC: 127 MG/DL (ref 70–99)
GLUCOSE BLDC GLUCOMTR-MCNC: 129 MG/DL (ref 70–99)
GLUCOSE BLDC GLUCOMTR-MCNC: 135 MG/DL (ref 70–99)
GLUCOSE BLDC GLUCOMTR-MCNC: 144 MG/DL (ref 70–99)
GLUCOSE SERPL-MCNC: 117 MG/DL (ref 70–99)
HBV SURFACE AB SERPL IA-ACNC: <3.5 M[IU]/ML
HBV SURFACE AB SERPL IA-ACNC: NONREACTIVE M[IU]/ML
HBV SURFACE AG SERPL QL IA: NONREACTIVE
HCT VFR BLD AUTO: 24.8 % (ref 40–53)
HGB BLD-MCNC: 8.5 G/DL (ref 13.3–17.7)
MCH RBC QN AUTO: 32.3 PG (ref 26.5–33)
MCHC RBC AUTO-ENTMCNC: 34.3 G/DL (ref 31.5–36.5)
MCV RBC AUTO: 94 FL (ref 78–100)
PHOSPHATE SERPL-MCNC: 4.1 MG/DL (ref 2.5–4.5)
PLATELET # BLD AUTO: 370 10E3/UL (ref 150–450)
POTASSIUM SERPL-SCNC: 4.3 MMOL/L (ref 3.4–5.3)
RBC # BLD AUTO: 2.63 10E6/UL (ref 4.4–5.9)
SODIUM SERPL-SCNC: 133 MMOL/L (ref 135–145)
URATE SERPL-MCNC: 3.9 MG/DL (ref 3.4–7)
WBC # BLD AUTO: 11.9 10E3/UL (ref 4–11)

## 2024-02-11 PROCEDURE — 84550 ASSAY OF BLOOD/URIC ACID: CPT | Performed by: INTERNAL MEDICINE

## 2024-02-11 PROCEDURE — 99233 SBSQ HOSP IP/OBS HIGH 50: CPT | Mod: GC

## 2024-02-11 PROCEDURE — 85027 COMPLETE CBC AUTOMATED: CPT

## 2024-02-11 PROCEDURE — 99232 SBSQ HOSP IP/OBS MODERATE 35: CPT | Performed by: INTERNAL MEDICINE

## 2024-02-11 PROCEDURE — 250N000011 HC RX IP 250 OP 636: Performed by: FAMILY MEDICINE

## 2024-02-11 PROCEDURE — 250N000013 HC RX MED GY IP 250 OP 250 PS 637

## 2024-02-11 PROCEDURE — 97535 SELF CARE MNGMENT TRAINING: CPT | Mod: GO

## 2024-02-11 PROCEDURE — 97530 THERAPEUTIC ACTIVITIES: CPT | Mod: GP

## 2024-02-11 PROCEDURE — 97166 OT EVAL MOD COMPLEX 45 MIN: CPT | Mod: GO

## 2024-02-11 PROCEDURE — 97161 PT EVAL LOW COMPLEX 20 MIN: CPT | Mod: GP

## 2024-02-11 PROCEDURE — 120N000001 HC R&B MED SURG/OB

## 2024-02-11 PROCEDURE — 36415 COLL VENOUS BLD VENIPUNCTURE: CPT

## 2024-02-11 PROCEDURE — 82040 ASSAY OF SERUM ALBUMIN: CPT | Performed by: INTERNAL MEDICINE

## 2024-02-11 RX ORDER — LEVOFLOXACIN 250 MG/1
250 TABLET, FILM COATED ORAL
Qty: 3 TABLET | Refills: 0 | Status: DISCONTINUED | OUTPATIENT
Start: 2024-02-11 | End: 2024-02-14 | Stop reason: HOSPADM

## 2024-02-11 RX ADMIN — LEVOFLOXACIN 250 MG: 250 TABLET, FILM COATED ORAL at 20:15

## 2024-02-11 RX ADMIN — ACETAMINOPHEN 650 MG: 325 TABLET ORAL at 20:22

## 2024-02-11 RX ADMIN — PIPERACILLIN AND TAZOBACTAM 3.38 G: 3; .375 INJECTION, POWDER, FOR SOLUTION INTRAVENOUS at 08:20

## 2024-02-11 RX ADMIN — SIMVASTATIN 20 MG: 20 TABLET, FILM COATED ORAL at 20:15

## 2024-02-11 ASSESSMENT — ACTIVITIES OF DAILY LIVING (ADL)
DIFFICULTY_EATING/SWALLOWING: NO
ADLS_ACUITY_SCORE: 45
ADLS_ACUITY_SCORE: 39
CHANGE_IN_FUNCTIONAL_STATUS_SINCE_ONSET_OF_CURRENT_ILLNESS/INJURY: YES
ADLS_ACUITY_SCORE: 45
CONCENTRATING,_REMEMBERING_OR_MAKING_DECISIONS_DIFFICULTY: NO
HEARING_DIFFICULTY_OR_DEAF: YES
ADLS_ACUITY_SCORE: 44
FALL_HISTORY_WITHIN_LAST_SIX_MONTHS: YES
ADLS_ACUITY_SCORE: 44
ADLS_ACUITY_SCORE: 39
ADLS_ACUITY_SCORE: 45
ADLS_ACUITY_SCORE: 45
USE_OF_HEARING_ASSISTIVE_DEVICES: BILATERAL HEARING AIDS
DESCRIBE_HEARING_LOSS: BILATERAL HEARING LOSS
NUMBER_OF_TIMES_PATIENT_HAS_FALLEN_WITHIN_LAST_SIX_MONTHS: 7
ADLS_ACUITY_SCORE: 38
WEAR_GLASSES_OR_BLIND: YES
PATIENT'S_PREFERRED_MEANS_OF_COMMUNICATION: VERBAL
ADLS_ACUITY_SCORE: 45
TOILETING_ISSUES: NO
ADLS_ACUITY_SCORE: 38
DIFFICULTY_COMMUNICATING: NO
WALKING_OR_CLIMBING_STAIRS: AMBULATION DIFFICULTY, REQUIRES EQUIPMENT
DOING_ERRANDS_INDEPENDENTLY_DIFFICULTY: NO
PREVIOUS_RESPONSIBILITIES: MEAL PREP;HOUSEKEEPING;LAUNDRY;SHOPPING;YARDWORK;MEDICATION MANAGEMENT;FINANCES;DRIVING
WERE_AUXILIARY_AIDS_OFFERED?: NO
EQUIPMENT_CURRENTLY_USED_AT_HOME: CANE, QUAD;WALKER, ROLLING
ADLS_ACUITY_SCORE: 45
DRESSING/BATHING_DIFFICULTY: NO
WALKING_OR_CLIMBING_STAIRS_DIFFICULTY: YES

## 2024-02-11 NOTE — PROGRESS NOTES
02/11/24 0830   Appointment Info   Signing Clinician's Name / Credentials (PT) Andreea Pacheco PT DPT [facility]   Living Environment   People in Home alone   Current Living Arrangements house   Home Accessibility stairs to enter home;stairs within home   Number of Stairs, Main Entrance 1   Stair Railings, Main Entrance none   Number of Stairs, Within Home, Primary greater than 10 stairs   Stair Railings, Within Home, Primary railing on left side (ascending)   Transportation Anticipated agency   Living Environment Comments Pt lives in a multi-level home and would need to be able to amb up 1 flight of stairs to access bedroom.   Self-Care   Equipment Currently Used at Home cane, straight;walker, rolling;other (see comments)  (FWW and 4WW)   Activity/Exercise/Self-Care Comment prior to initial hospitalization, patient was I with functional mobility   General Information   Onset of Illness/Injury or Date of Surgery 02/09/24   Referring Physician Joann Leigh DO   Patient/Family Therapy Goals Statement (PT) return home   Pertinent History of Current Problem (include personal factors and/or comorbidities that impact the POC) Admitted on 2/9/24 to ED due to nausea, vomitting and diarrhea with hypokalemia, leukocystosis, Acute renal failrue with CKD III.  PMH: DM, HTN, HLD, Afib, R foot drop/AFO from previous back surgery.  H/o spinal surgeries x 6.  Reports 6+ falls over last 6 months.   Existing Precautions/Restrictions fall   General Observations R AFO d/t foot drop from previous back surgeries   Cognition   Affect/Mental Status (Cognition) WNL   Orientation Status (Cognition) oriented x 4   Follows Commands (Cognition) WNL   Range of Motion (ROM)   ROM Comment B LE (except R foot/ankle) grossly WNL   Strength (Manual Muscle Testing)   Strength Comments B LE (except R foot/ankle) grossly > 3+/5   Transfers   Comment, (Transfers) sit <> stand from BS chair with FWW and Min A   Gait/Stairs (Locomotion)   [place]  Level (Gait) contact guard   Assistive Device (Gait) walker, front-wheeled   Distance in Feet (Gait) 5 ft   Pattern (Gait) step-through   Deviations/Abnormal Patterns (Gait) weight shifting decreased;right sided deviations;gait speed decreased   Right Sided Gait Deviations foot drop/toe drag   Clinical Impression   Criteria for Skilled Therapeutic Intervention Yes, treatment indicated   PT Diagnosis (PT) impaired functional mobility   Influenced by the following impairments weakness, decreased endurance, impaired balance/falls   Functional limitations due to impairments stairs, transfers, gait   Clinical Presentation (PT Evaluation Complexity) stable   Clinical Presentation Rationale pt presents as medically diagnosed   Clinical Decision Making (Complexity) low complexity   Planned Therapy Interventions (PT) balance training;gait training;stair training;strengthening   Risk & Benefits of therapy have been explained evaluation/treatment results reviewed;care plan/treatment goals reviewed;risks/benefits reviewed;current/potential barriers reviewed;participants voiced agreement with care plan;participants included;patient   PT Total Evaluation Time   PT Eval, Low Complexity Minutes (41682) 15   Physical Therapy Goals   PT Frequency Daily   PT Predicted Duration/Target Date for Goal Attainment 02/18/24   PT: Gait Supervision/stand-by assist;Assistive device;Greater than 200 feet   PT: Stairs Supervision/stand-by assist;Greater than 10 stairs;Rail on left;Assistive device   Therapeutic Activity   Therapeutic Activities: dynamic activities to improve functional performance Minutes (49804) 8   Symptoms Noted During/After Treatment Fatigue   Treatment Detail/Skilled Intervention Pt sitting up in chair when approached for PT.  Agreeable to participate in evaluation/treatment.  Cues for transfer sequencing with sit > stand for correct hand placement and physical Min A due to low chair.  Stand to Sit with cues to square up to  chair and Min A to control descent.   PT Discharge Planning   PT Plan gait, strenghening ex's, balance activities   PT Discharge Recommendation (DC Rec) Transitional Care Facility   PT Rationale for DC Rec pt lives alone and requires in creased assist for transfers/gait with increased fall risk   PT Brief overview of current status Min A with Sit <> stand for safety; amb in room with CGA and assist for equipment management   Total Session Time   Timed Code Treatment Minutes 8   Total Session Time (sum of timed and untimed services) 23

## 2024-02-11 NOTE — PLAN OF CARE
Goal Outcome Evaluation:  Problem: Hemodialysis  Goal: Effective Tissue Perfusion  2/11/2024 1054 by Leyla Hitchcock, RN  Outcome: Progressing    Problem: Electrolyte Imbalance  Goal: Electrolyte Balance  2/11/2024 1054 by Leyla Hitchcock, RN  Outcome: Progressing     Problem: UTI (Urinary Tract Infection)  Goal: Improved Infection Symptoms  Outcome: Progressing    Patient A&Ox4, VSS. No c/o pain, SOB, H/N/V. LS clear. Right internal jugular drsg C/D/I. Up w/ Ax1-2, walker and gait belt. Able to make needs known and call light within reach. Bed/chair alarm on for safety.

## 2024-02-11 NOTE — PROGRESS NOTES
[facility] GERIATRIC SERVICES  Chief Complaint   Patient presents with    RECHECK     [facility] Record Number:  [id number]  Place of Service where encounter took place:  [facility] ([facility]) [[id number]]  Code Status:  Full Code     HISTORY:      HPI:  Pa García  is 78 year old (1945) undergoing physical and occupational therapy. He is with past medical history hypertension, chronic bilateral hip pain, diabetes type 2, GERD, HDL,Excerpted from records  He presented  for progressively worsening generalized weakness and mechanical fall.  No LOS, did not hit head.  No associated symptoms.  No chest pain or palpitations. Presented afebrile, vitally stable, satting well on RA.  He had mild elevation of troponin that down trended on repeat otherwise CBC, CMP Pro-Julio, lactic acid, UA, chest x-ray, UA, flu/RSV/COVID were unremarkable.  Blood cultures have been no growth to date.  He was also noted to have atrial flutter with an elevated troponin per records the flutter appears to be new.  He was asymptomatic, cardiology was consulted and he had an echo that had normal LV function without wall motion abnormalities.  He was recommended he be a candidate for anticoagulation however patient refused due to history of recurrent falls.  He plans to follow-up outpatient cardiology for possible electrophysiology consult       Today he was seen at the bedside to review vital signs, labs, routine visit.  Recently noted to have hyperkalemia was given Kayexalate.  Repeat labs worsening.  Creatinine went from 2.54 to 4.08.  Continue to be hyperkalemic at 5.7 with a sodium of 131.  He was also with nausea and vomiting this morning.  He did report to writer that the vomiting started this morning.  He denied chest pain shortness of breath cough, he was noted to have upper airway congestion this morning.  Denied constipation or diarrhea.  He is with chronic right foot drop.  Type II diabetic on metformin which was  discontinued last night from the on-call provider.  He does have chronic pain bilateral hips left more pronounced than the right.  Per hospital records he did receive bilateral hip injections in the hospital.  Per his report he ambulates with a cane and a four-wheel walker for longer distances.  He was sent to the ER for further evaluation and acute kidney injury.    ALLERGIES:Ezetimibe-simvastatin and Metformin    PAST MEDICAL HISTORY: History reviewed. No pertinent past medical history.    PAST SURGICAL HISTORY:   has a past surgical history that includes back surgery; Lumbar Laminectomy (Bilateral, 3/8/2016); and Lumbar Laminectomy (3/13/2016).    FAMILY HISTORY: family history includes Cancer in his maternal grandfather, maternal grandmother, paternal grandfather, and paternal grandmother; Dementia in his father.    SOCIAL HISTORY:  reports that he has never smoked. He has never used smokeless tobacco. He reports current alcohol use. He reports that he does not use drugs.    ROS:  Constitutional: Negative for activity change, appetite change, fatigue and fever. HX Falls  HENT: Negative for congestion.    Respiratory: Negative for cough, shortness of breath and wheezing.    Cardiovascular: Negative for chest pain and leg swelling.   Gastrointestinal: Negative for abdominal distention, abdominal pain, constipation, diarrhea positive for nausea and vomiting   genitourinary: Negative for dysuria.   Musculoskeletal: Positive  for arthralgia. Negative for back pain. Bilateral hips   Skin: Negative for color change and wound. Per records stage 1 coccyx   Neurological: Negative for dizziness.   Psychiatric/Behavioral: Negative for agitation, behavioral problems and confusion.     Physical Exam:  Constitutional:       Appearance: Patient is well-developed.   HENT:      Head: Normocephalic.   Eyes:      Conjunctiva/sclera: Conjunctivae normal.   Neck:      Musculoskeletal: Normal range of motion.   Cardiovascular:       "Rate and Rhythm: Normal rate and regular rhythm.      Heart sounds: Normal heart sounds. No murmur.   Pulmonary:      Effort: No respiratory distress.      Breath sounds: Normal breath sounds. No wheezing or rales.   Abdominal:      General: Bowel sounds are normal. There is no distension.      Palpations: Abdomen is soft.      Tenderness: There is no abdominal tenderness.   Musculoskeletal:       Normal range of motion.     Skin:General:        Skin is warm.   Neurological:         Mental Status: Patient is alert and oriented to person, place, and time.   Psychiatric:         Behavior: Behavior normal.     Vitals:BP (!) 165/66   Pulse 98   Temp 97.8  F (36.6  C)   Resp 18   Ht 1.753 m (5' 9\")   Wt 68.9 kg (152 lb)   SpO2 92%   BMI 22.45 kg/m   and Body mass index is 22.45 kg/m .    Lab/Diagnostic data:   Recent Results (from the past 240 hour(s))   ECG 12-LEAD WITH MUSE ([facility])    Collection Time: 02/01/24 11:08 PM   Result Value Ref Range    Systolic Blood Pressure  mmHg    Diastolic Blood Pressure  mmHg    Ventricular Rate 95 BPM    Atrial Rate 95 BPM    AR Interval  ms    QRS Duration 92 ms     ms    QTc 439 ms    P Axis  degrees    R AXIS 28 degrees    T Axis 25 degrees    Interpretation ECG       Sinus rhythm with A-V dissociation and Accelerated Junctional rhythm with occasional Premature ventricular complexes  Incomplete right bundle branch block  Septal infarct , age undetermined  Abnormal ECG  When compared with ECG of 08-JUN-2021 13:20,  Junctional rhythm has replaced Sinus rhythm  Incomplete right bundle branch block is now Present  Septal infarct is now Present     Comprehensive metabolic panel    Collection Time: 02/01/24 11:10 PM   Result Value Ref Range    Sodium 143 135 - 145 mmol/L    Potassium 4.6 3.4 - 5.3 mmol/L    Carbon Dioxide (CO2) 22 22 - 29 mmol/L    Anion Gap 11 7 - 15 mmol/L    Urea Nitrogen 31.1 (H) 8.0 - 23.0 mg/dL    Creatinine 1.76 (H) 0.67 - 1.17 mg/dL    GFR Estimate 39 " (L) >60 mL/min/1.73m2    Calcium 9.6 8.8 - 10.2 mg/dL    Chloride 110 (H) 98 - 107 mmol/L    Glucose 107 (H) 70 - 99 mg/dL    Alkaline Phosphatase 70 40 - 150 U/L    AST 20 0 - 45 U/L    ALT 10 0 - 70 U/L    Protein Total 6.8 6.4 - 8.3 g/dL    Albumin 4.2 3.5 - 5.2 g/dL    Bilirubin Total 0.4 <=1.2 mg/dL   Troponin T, High Sensitivity (now)    Collection Time: 02/01/24 11:10 PM   Result Value Ref Range    Troponin T, High Sensitivity 85 (H) <=22 ng/L   Procalcitonin    Collection Time: 02/01/24 11:10 PM   Result Value Ref Range    Procalcitonin 0.29 <0.50 ng/mL   Lactic acid whole blood    Collection Time: 02/01/24 11:10 PM   Result Value Ref Range    Lactic Acid 1.5 0.7 - 2.0 mmol/L   Blood Culture Peripheral Blood    Collection Time: 02/01/24 11:10 PM    Specimen: Peripheral Blood   Result Value Ref Range    Culture No Growth    CBC with platelets and differential    Collection Time: 02/01/24 11:10 PM   Result Value Ref Range    WBC Count 10.8 4.0 - 11.0 10e3/uL    RBC Count 2.96 (L) 4.40 - 5.90 10e6/uL    Hemoglobin 9.6 (L) 13.3 - 17.7 g/dL    Hematocrit 29.1 (L) 40.0 - 53.0 %    MCV 98 78 - 100 fL    MCH 32.4 26.5 - 33.0 pg    MCHC 33.0 31.5 - 36.5 g/dL    RDW 13.5 10.0 - 15.0 %    Platelet Count 263 150 - 450 10e3/uL    % Neutrophils 83 %    % Lymphocytes 8 %    % Monocytes 8 %    % Eosinophils 0 %    % Basophils 0 %    % Immature Granulocytes 1 %    NRBCs per 100 WBC 0 <1 /100    Absolute Neutrophils 9.0 (H) 1.6 - 8.3 10e3/uL    Absolute Lymphocytes 0.8 0.8 - 5.3 10e3/uL    Absolute Monocytes 0.9 0.0 - 1.3 10e3/uL    Absolute Eosinophils 0.0 0.0 - 0.7 10e3/uL    Absolute Basophils 0.0 0.0 - 0.2 10e3/uL    Absolute Immature Granulocytes 0.1 <=0.4 10e3/uL    Absolute NRBCs 0.0 10e3/uL   Hemoglobin A1c    Collection Time: 02/01/24 11:10 PM   Result Value Ref Range    Hemoglobin A1C 6.7 (H) <5.7 %   Blood Culture Peripheral Blood    Collection Time: 02/02/24 12:01 AM    Specimen: Peripheral Blood   Result Value  Ref Range    Culture No Growth    Asymptomatic Influenza A/B, RSV, & SARS-CoV2 PCR (COVID-19) Nasopharyngeal    Collection Time: 02/02/24 12:13 AM    Specimen: Nasopharyngeal; Swab   Result Value Ref Range    Influenza A PCR Negative Negative    Influenza B PCR Negative Negative    RSV PCR Negative Negative    SARS CoV2 PCR Negative Negative   UA with Microscopic reflex to Culture    Collection Time: 02/02/24  1:41 AM    Specimen: Urine, Catheter   Result Value Ref Range    Color Urine Light Yellow Colorless, Straw, Light Yellow, Yellow    Appearance Urine Clear Clear    Glucose Urine Negative Negative mg/dL    Bilirubin Urine Negative Negative    Ketones Urine Negative Negative mg/dL    Specific Gravity Urine 1.011 1.001 - 1.030    Blood Urine Negative Negative    pH Urine 5.0 5.0 - 7.0    Protein Albumin Urine 10 (A) Negative mg/dL    Urobilinogen Urine <2.0 <2.0 mg/dL    Nitrite Urine Negative Negative    Leukocyte Esterase Urine Negative Negative    Bacteria Urine Few (A) None Seen /HPF    Mucus Urine Present (A) None Seen /LPF    RBC Urine 1 <=2 /HPF    WBC Urine 1 <=5 /HPF    Squamous Epithelials Urine <1 <=1 /HPF    Hyaline Casts Urine 8 (H) <=2 /LPF   Troponin T, High Sensitivity (now)    Collection Time: 02/02/24  2:39 AM   Result Value Ref Range    Troponin T, High Sensitivity 70 (H) <=22 ng/L   TSH    Collection Time: 02/02/24  2:39 AM   Result Value Ref Range    TSH 2.62 0.30 - 4.20 uIU/mL   Basic metabolic panel    Collection Time: 02/02/24  5:40 AM   Result Value Ref Range    Sodium 143 135 - 145 mmol/L    Potassium 4.1 3.4 - 5.3 mmol/L    Chloride 112 (H) 98 - 107 mmol/L    Carbon Dioxide (CO2) 23 22 - 29 mmol/L    Anion Gap 8 7 - 15 mmol/L    Urea Nitrogen 28.9 (H) 8.0 - 23.0 mg/dL    Creatinine 1.57 (H) 0.67 - 1.17 mg/dL    GFR Estimate 45 (L) >60 mL/min/1.73m2    Calcium 8.9 8.8 - 10.2 mg/dL    Glucose 96 70 - 99 mg/dL   CBC with platelets    Collection Time: 02/02/24  5:40 AM   Result Value Ref  Range    WBC Count 8.4 4.0 - 11.0 10e3/uL    RBC Count 2.74 (L) 4.40 - 5.90 10e6/uL    Hemoglobin 9.0 (L) 13.3 - 17.7 g/dL    Hematocrit 27.2 (L) 40.0 - 53.0 %    MCV 99 78 - 100 fL    MCH 32.8 26.5 - 33.0 pg    MCHC 33.1 31.5 - 36.5 g/dL    RDW 13.7 10.0 - 15.0 %    Platelet Count 232 150 - 450 10e3/uL   Glucose by meter    Collection Time: 02/02/24  8:30 AM   Result Value Ref Range    GLUCOSE BY METER POCT 104 (H) 70 - 99 mg/dL   Basic metabolic panel    Collection Time: 02/03/24  4:19 AM   Result Value Ref Range    Sodium 141 135 - 145 mmol/L    Potassium 4.0 3.4 - 5.3 mmol/L    Chloride 108 (H) 98 - 107 mmol/L    Carbon Dioxide (CO2) 22 22 - 29 mmol/L    Anion Gap 11 7 - 15 mmol/L    Urea Nitrogen 23.2 (H) 8.0 - 23.0 mg/dL    Creatinine 1.38 (H) 0.67 - 1.17 mg/dL    GFR Estimate 52 (L) >60 mL/min/1.73m2    Calcium 9.5 8.8 - 10.2 mg/dL    Glucose 137 (H) 70 - 99 mg/dL   Glucose by meter    Collection Time: 02/04/24  2:08 PM   Result Value Ref Range    GLUCOSE BY METER POCT 175 (H) 70 - 99 mg/dL   Basic metabolic panel    Collection Time: 02/06/24  6:30 AM   Result Value Ref Range    Sodium 133 (L) 135 - 145 mmol/L    Potassium 5.7 (H) 3.4 - 5.3 mmol/L    Chloride 103 98 - 107 mmol/L    Carbon Dioxide (CO2) 16 (L) 22 - 29 mmol/L    Anion Gap 14 7 - 15 mmol/L    Urea Nitrogen 52.1 (H) 8.0 - 23.0 mg/dL    Creatinine 2.54 (H) 0.67 - 1.17 mg/dL    GFR Estimate 25 (L) >60 mL/min/1.73m2    Calcium 8.4 (L) 8.8 - 10.2 mg/dL    Glucose 132 (H) 70 - 99 mg/dL   CBC with platelets    Collection Time: 02/06/24  6:30 AM   Result Value Ref Range    WBC Count 11.9 (H) 4.0 - 11.0 10e3/uL    RBC Count 2.66 (L) 4.40 - 5.90 10e6/uL    Hemoglobin 8.6 (L) 13.3 - 17.7 g/dL    Hematocrit 26.7 (L) 40.0 - 53.0 %     78 - 100 fL    MCH 32.3 26.5 - 33.0 pg    MCHC 32.2 31.5 - 36.5 g/dL    RDW 14.0 10.0 - 15.0 %    Platelet Count 300 150 - 450 10e3/uL   Basic metabolic panel    Collection Time: 02/08/24  6:05 AM   Result Value Ref  Range    Sodium 131 (L) 135 - 145 mmol/L    Potassium 5.7 (H) 3.4 - 5.3 mmol/L    Chloride 99 98 - 107 mmol/L    Carbon Dioxide (CO2) 16 (L) 22 - 29 mmol/L    Anion Gap 16 (H) 7 - 15 mmol/L    Urea Nitrogen 70.3 (H) 8.0 - 23.0 mg/dL    Creatinine 4.08 (H) 0.67 - 1.17 mg/dL    GFR Estimate 14 (L) >60 mL/min/1.73m2    Calcium 8.4 (L) 8.8 - 10.2 mg/dL    Glucose 100 (H) 70 - 99 mg/dL   ECG 12-LEAD WITH MUSE ([facility])    Collection Time: 02/09/24  9:10 AM   Result Value Ref Range    Systolic Blood Pressure 146 mmHg    Diastolic Blood Pressure 72 mmHg    Ventricular Rate 86 BPM    Atrial Rate 86 BPM    ME Interval 124 ms    QRS Duration 122 ms     ms    QTc 440 ms    P Axis 84 degrees    R AXIS -42 degrees    T Axis 81 degrees    Interpretation ECG       Sinus rhythm with Premature supraventricular complexes  Left axis deviation  Septal infarct (cited on or before 01-FEB-2024)  Abnormal ECG  When compared with ECG of 02-FEB-2024 02:11,  Sinus rhythm has replaced Atrial flutter  Incomplete right bundle branch block is no longer Present  Confirmed by SEE ED PROVIDER NOTE FOR, ECG INTERPRETATION ([id number]),  RADHA DANIELS ([id number]) on 2/9/2024 9:13:49 AM     Basic metabolic panel    Collection Time: 02/09/24  9:41 AM   Result Value Ref Range    Sodium 127 (L) 135 - 145 mmol/L    Potassium 6.3 (HH) 3.4 - 5.3 mmol/L    Chloride 92 (L) 98 - 107 mmol/L    Carbon Dioxide (CO2) 10 (LL) 22 - 29 mmol/L    Anion Gap 25 (H) 7 - 15 mmol/L    Urea Nitrogen 77.8 (H) 8.0 - 23.0 mg/dL    Creatinine 5.15 (H) 0.67 - 1.17 mg/dL    GFR Estimate 11 (L) >60 mL/min/1.73m2    Calcium 8.6 (L) 8.8 - 10.2 mg/dL    Glucose 83 70 - 99 mg/dL   Hepatic panel    Collection Time: 02/09/24  9:41 AM   Result Value Ref Range    Protein Total 6.7 6.4 - 8.3 g/dL    Albumin 3.7 3.5 - 5.2 g/dL    Bilirubin Total 0.3 <=1.2 mg/dL    Alkaline Phosphatase 61 40 - 150 U/L    AST 16 0 - 45 U/L    ALT 9 0 - 70 U/L    Bilirubin Direct <0.20 0.00 - 0.30 mg/dL   Lipase     Collection Time: 02/09/24  9:41 AM   Result Value Ref Range    Lipase 9 (L) 13 - 60 U/L   Lactic acid whole blood    Collection Time: 02/09/24  9:41 AM   Result Value Ref Range    Lactic Acid 6.1 (HH) 0.7 - 2.0 mmol/L   CBC with platelets and differential    Collection Time: 02/09/24  9:41 AM   Result Value Ref Range    WBC Count 23.1 (H) 4.0 - 11.0 10e3/uL    RBC Count 3.19 (L) 4.40 - 5.90 10e6/uL    Hemoglobin 10.4 (L) 13.3 - 17.7 g/dL    Hematocrit 32.3 (L) 40.0 - 53.0 %     (H) 78 - 100 fL    MCH 32.6 26.5 - 33.0 pg    MCHC 32.2 31.5 - 36.5 g/dL    RDW 13.3 10.0 - 15.0 %    Platelet Count 412 150 - 450 10e3/uL    % Neutrophils 85 %    % Lymphocytes 5 %    % Monocytes 8 %    % Eosinophils 0 %    % Basophils 0 %    % Immature Granulocytes 2 %    NRBCs per 100 WBC 0 <1 /100    Absolute Neutrophils 19.7 (H) 1.6 - 8.3 10e3/uL    Absolute Lymphocytes 1.1 0.8 - 5.3 10e3/uL    Absolute Monocytes 1.8 (H) 0.0 - 1.3 10e3/uL    Absolute Eosinophils 0.0 0.0 - 0.7 10e3/uL    Absolute Basophils 0.1 0.0 - 0.2 10e3/uL    Absolute Immature Granulocytes 0.4 <=0.4 10e3/uL    Absolute NRBCs 0.0 10e3/uL   CK total    Collection Time: 02/09/24  9:41 AM   Result Value Ref Range    CK 72 39 - 308 U/L   RBC and Platelet Morphology    Collection Time: 02/09/24  9:41 AM   Result Value Ref Range    Platelet Assessment  Automated Count Confirmed. Platelet morphology is normal.     Automated Count Confirmed. Platelet morphology is normal.    Acanthocytes Slight (A) None Seen    Camille Rods      Basophilic Stippling      Bite Cells      Blister Cells      Tracy Cells      Elliptocytes      Hgb C Crystals      Overton-Jolly Bodies      Hypersegmented Neutrophils      Polychromasia      RBC agglutination      RBC Fragments      Reactive Lymphocytes      Rouleaux      Sickle Cells      Smudge Cells      Spherocytes      Stomatocytes      Target Cells      Teardrop Cells      Toxic Neutrophils      RBC Morphology Confirmed RBC Indices     Blood Culture Peripheral Blood    Collection Time: 02/09/24 10:22 AM    Specimen: Peripheral Blood   Result Value Ref Range    Culture No growth after 1 day    Blood Culture Peripheral Blood    Collection Time: 02/09/24 11:05 AM    Specimen: Peripheral Blood   Result Value Ref Range    Culture No growth after 1 day    Lactic acid whole blood    Collection Time: 02/09/24 11:11 AM   Result Value Ref Range    Lactic Acid 6.4 (HH) 0.7 - 2.0 mmol/L   Blood gas venous    Collection Time: 02/09/24 11:33 AM   Result Value Ref Range    pH Venous 7.03 (LL) 7.32 - 7.43    pCO2 Venous 37 (L) 40 - 50 mm Hg    pO2 Venous 53 (H) 25 - 47 mm Hg    Bicarbonate Venous 10 (LL) 21 - 28 mmol/L    Base Excess/Deficit Venous -21.4 (L) -3.0 - 3.0 mmol/L    FIO2 21     Oxyhemoglobin Venous 74 70 - 75 %    O2 Sat, Venous 74.3 70.0 - 75.0 %   Glucose by meter    Collection Time: 02/09/24 12:48 PM   Result Value Ref Range    GLUCOSE BY METER POCT 112 (H) 70 - 99 mg/dL   UA with Microscopic reflex to Culture    Collection Time: 02/09/24 12:49 PM    Specimen: Urine, Gilbert Catheter   Result Value Ref Range    Color Urine Yellow Colorless, Straw, Light Yellow, Yellow    Appearance Urine Cloudy (A) Clear    Glucose Urine Negative Negative mg/dL    Bilirubin Urine Negative Negative    Ketones Urine Negative Negative mg/dL    Specific Gravity Urine 1.012 1.001 - 1.030    Blood Urine 0.2 mg/dL (A) Negative    pH Urine 6.5 5.0 - 7.0    Protein Albumin Urine 300 (A) Negative mg/dL    Urobilinogen Urine <2.0 <2.0 mg/dL    Nitrite Urine Negative Negative    Leukocyte Esterase Urine 500 Michael/uL (A) Negative    Bacteria Urine Many (A) None Seen /HPF    WBC Clumps Urine Present (A) None Seen /HPF    Mucus Urine Present (A) None Seen /LPF    RBC Urine 89 (H) <=2 /HPF    WBC Urine >182 (H) <=5 /HPF   Protein  random urine    Collection Time: 02/09/24 12:49 PM   Result Value Ref Range    Total Protein Urine mg/dL 305.0   mg/dL    Total Protein Urine mg/mg Creat  8.40 (H) 0.00 - 0.20 mg/mg Cr    Creatinine Urine mg/dL 36.3 mg/dL   Urine Culture    Collection Time: 02/09/24 12:49 PM    Specimen: Urine, Gilbert Catheter   Result Value Ref Range    Culture >100,000 CFU/mL Escherichia coli (A)        Susceptibility    Escherichia coli - JACK     Ampicillin <=2 Susceptible ug/mL     Ampicillin/ Sulbactam <=2 Susceptible ug/mL     Piperacillin/Tazobactam <=4 Susceptible ug/mL     Cefazolin* <=4 Susceptible ug/mL      * Cefazolin JACK breakpoints are for the treatment of uncomplicated urinary tract infections. For the treatment of systemic infections, please contact the laboratory for additional testing.     Cefoxitin <=4 Susceptible ug/mL     Ceftazidime <=1 Susceptible ug/mL     Ceftriaxone <=1 Susceptible ug/mL     Cefepime <=1 Susceptible ug/mL     Gentamicin <=1 Susceptible ug/mL     Tobramycin <=1 Susceptible ug/mL     Ciprofloxacin <=0.25 Susceptible ug/mL     Levofloxacin <=0.12 Susceptible ug/mL     Nitrofurantoin <=16 Susceptible ug/mL     Trimethoprim/Sulfamethoxazole <=1/19 Susceptible ug/mL   Potassium    Collection Time: 02/09/24  1:12 PM   Result Value Ref Range    Potassium 5.2 3.4 - 5.3 mmol/L   Glucose by meter    Collection Time: 02/09/24  1:42 PM   Result Value Ref Range    GLUCOSE BY METER POCT 119 (H) 70 - 99 mg/dL   Potassium    Collection Time: 02/09/24  2:22 PM   Result Value Ref Range    Potassium 5.1 3.4 - 5.3 mmol/L   Glucose by meter    Collection Time: 02/09/24  2:43 PM   Result Value Ref Range    GLUCOSE BY METER POCT 172 (H) 70 - 99 mg/dL   Glucose by meter    Collection Time: 02/09/24  3:08 PM   Result Value Ref Range    GLUCOSE BY METER POCT 183 (H) 70 - 99 mg/dL   Glucose by meter    Collection Time: 02/09/24  3:59 PM   Result Value Ref Range    GLUCOSE BY METER POCT 191 (H) 70 - 99 mg/dL   Glucose by meter    Collection Time: 02/09/24  5:28 PM   Result Value Ref Range    GLUCOSE BY METER POCT 193 (H) 70 - 99 mg/dL   Glucose by meter    Collection  Time: 02/09/24  6:15 PM   Result Value Ref Range    GLUCOSE BY METER POCT 186 (H) 70 - 99 mg/dL   Lactic acid whole blood    Collection Time: 02/09/24  6:43 PM   Result Value Ref Range    Lactic Acid 5.8 (HH) 0.7 - 2.0 mmol/L   Glucose by meter    Collection Time: 02/09/24  7:15 PM   Result Value Ref Range    GLUCOSE BY METER POCT 178 (H) 70 - 99 mg/dL   Glucose by meter    Collection Time: 02/09/24  9:53 PM   Result Value Ref Range    GLUCOSE BY METER POCT 192 (H) 70 - 99 mg/dL   Lactic acid whole blood    Collection Time: 02/10/24 12:09 AM   Result Value Ref Range    Lactic Acid 5.4 (HH) 0.7 - 2.0 mmol/L   Renal panel    Collection Time: 02/10/24  5:52 AM   Result Value Ref Range    Sodium 132 (L) 135 - 145 mmol/L    Potassium 4.5 3.4 - 5.3 mmol/L    Chloride 93 (L) 98 - 107 mmol/L    Carbon Dioxide (CO2) 23 22 - 29 mmol/L    Anion Gap 16 (H) 7 - 15 mmol/L    Glucose 171 (H) 70 - 99 mg/dL    Urea Nitrogen 47.5 (H) 8.0 - 23.0 mg/dL    Creatinine 4.05 (H) 0.67 - 1.17 mg/dL    GFR Estimate 14 (L) >60 mL/min/1.73m2    Calcium 8.1 (L) 8.8 - 10.2 mg/dL    Albumin 2.9 (L) 3.5 - 5.2 g/dL    Phosphorus 4.9 (H) 2.5 - 4.5 mg/dL   CBC with platelets    Collection Time: 02/10/24  5:52 AM   Result Value Ref Range    WBC Count 12.7 (H) 4.0 - 11.0 10e3/uL    RBC Count 2.37 (L) 4.40 - 5.90 10e6/uL    Hemoglobin 7.7 (L) 13.3 - 17.7 g/dL    Hematocrit 22.3 (L) 40.0 - 53.0 %    MCV 94 78 - 100 fL    MCH 32.5 26.5 - 33.0 pg    MCHC 34.5 31.5 - 36.5 g/dL    RDW 13.5 10.0 - 15.0 %    Platelet Count 320 150 - 450 10e3/uL   Vancomycin level    Collection Time: 02/10/24  5:52 AM   Result Value Ref Range    Vancomycin 10.8   ug/mL   Glucose by meter    Collection Time: 02/10/24  8:25 AM   Result Value Ref Range    GLUCOSE BY METER POCT 151 (H) 70 - 99 mg/dL   Glucose by meter    Collection Time: 02/10/24 12:45 PM   Result Value Ref Range    GLUCOSE BY METER POCT 189 (H) 70 - 99 mg/dL   Hepatitis B Surface Antibody    Collection Time:  02/10/24 12:49 PM   Result Value Ref Range    Hepatitis B Surface Antibody Nonreactive     Hepatitis B Surface Antibody Instrument Value <3.50 <8.5 m[IU]/mL   Hepatitis B surface antigen    Collection Time: 02/10/24 12:49 PM   Result Value Ref Range    Hepatitis B Surface Antigen Nonreactive Nonreactive   Glucose by meter    Collection Time: 02/10/24  6:09 PM   Result Value Ref Range    GLUCOSE BY METER POCT 150 (H) 70 - 99 mg/dL   Glucose by meter    Collection Time: 02/10/24  8:54 PM   Result Value Ref Range    GLUCOSE BY METER POCT 171 (H) 70 - 99 mg/dL   CBC with platelets    Collection Time: 02/11/24  4:47 AM   Result Value Ref Range    WBC Count 11.9 (H) 4.0 - 11.0 10e3/uL    RBC Count 2.63 (L) 4.40 - 5.90 10e6/uL    Hemoglobin 8.5 (L) 13.3 - 17.7 g/dL    Hematocrit 24.8 (L) 40.0 - 53.0 %    MCV 94 78 - 100 fL    MCH 32.3 26.5 - 33.0 pg    MCHC 34.3 31.5 - 36.5 g/dL    RDW 13.6 10.0 - 15.0 %    Platelet Count 370 150 - 450 10e3/uL   Renal panel    Collection Time: 02/11/24  4:47 AM   Result Value Ref Range    Sodium 133 (L) 135 - 145 mmol/L    Potassium 4.3 3.4 - 5.3 mmol/L    Chloride 94 (L) 98 - 107 mmol/L    Carbon Dioxide (CO2) 27 22 - 29 mmol/L    Anion Gap 12 7 - 15 mmol/L    Glucose 117 (H) 70 - 99 mg/dL    Urea Nitrogen 29.7 (H) 8.0 - 23.0 mg/dL    Creatinine 3.42 (H) 0.67 - 1.17 mg/dL    GFR Estimate 18 (L) >60 mL/min/1.73m2    Calcium 8.0 (L) 8.8 - 10.2 mg/dL    Albumin 2.8 (L) 3.5 - 5.2 g/dL    Phosphorus 4.1 2.5 - 4.5 mg/dL   Uric acid    Collection Time: 02/11/24  4:47 AM   Result Value Ref Range    Uric Acid 3.9 3.4 - 7.0 mg/dL   Glucose by meter    Collection Time: 02/11/24  7:58 AM   Result Value Ref Range    GLUCOSE BY METER POCT 129 (H) 70 - 99 mg/dL        MEDICATIONS:     Review of your medicines            Accurate as of February 9, 2024  9:04 AM. If you have any questions, ask your nurse or doctor.                CONTINUE these medicines which have NOT CHANGED        Dose / Directions    acetaminophen 325 MG tablet  Commonly known as: TYLENOL      Dose: 650 mg  [ACETAMINOPHEN (TYLENOL) 325 MG TABLET] Take 650 mg by mouth every 4 (four) hours as needed for pain.  Refills: 0     amLODIPine 2.5 MG tablet  Commonly known as: NORVASC      Dose: 2.5 mg  Take 2.5 mg by mouth daily  Refills: 0     aspirin 81 MG EC tablet  Commonly known as: ASA      Dose: 81 mg  [ASPIRIN 81 MG EC TABLET] Take 81 mg by mouth daily.  Refills: 0     gabapentin 300 MG capsule  Commonly known as: NEURONTIN      Dose: 900 mg  Take 900 mg by mouth 2 times daily Take 3 capsules by mouth for a total of 900 mg two times per day.  Refills: 0     LANsoprazole 30 MG DR capsule  Commonly known as: PREVACID      Dose: 30 mg  Take 30 mg by mouth at bedtime  Refills: 0     polyethylene glycol 17 GM/Dose powder  Commonly known as: MIRALAX  Used for: Other constipation      Dose: 17 g  Take 17 g by mouth daily as needed for constipation  Quantity: 510 g  Refills: 0     PreserVision AREDS 2 Caps      Dose: 1 capsule  [VIT C,L-IK-DAXRM-LUTEIN-ZEAXAN (PRESERVISION AREDS-2) 250-90-40-1 MG CAP] Take 1 capsule by mouth 2 (two) times a day.  Refills: 0     simvastatin 20 MG tablet  Commonly known as: ZOCOR  Used for: Hyperlipidemia, unspecified hyperlipidemia type      Dose: 20 mg  Take 1 tablet (20 mg) by mouth at bedtime  Quantity: 90 tablet  Refills: 0              ASSESSMENT/PLAN  Encounter Diagnoses   Name Primary?    Elevated serum creatinine Yes    Hyponatremia     Nausea and vomiting, unspecified vomiting type      Elevated creatinine and potassium with a low sodium sent to the ER for further evaluation    Physical deconditioning PT OT    Falls will continue with therapy for strengthening    Pain management as needed Tylenol, gabapentin 900 mg twice daily,     Hypertension on Norvasc 2.5 mg daily, recently discontinued hydrochlorothiazide and valsartan due to elevated creatinine and potassium    diabetes type 2 on metformin 1000 mg twice  daily which was discontinued last night from on-call provider due to elevated creatinine    GERD on Prevacid HDL on simvastatin    Electronically signed by: Geneva Jaramillo CNP

## 2024-02-11 NOTE — PROGRESS NOTES
[facility]    Progress Note - Hospitalist Service       Date of Admission:  2/9/2024    Assessment & Plan   Pa García is a 78 year old male admitted on 2/9/2024. He has a history of HTN, HLD, DM, atrial flutter not anticoagulated, stage III chronic kidney disease, partial paraplegia with neurogenic bowel and bladder as a complication of previous back surgery with right foot drop who was recently admitted to  2/1- 2/4/24 for mechanical fall and generalized weakness. He is admitted for acute renal failure with hyperkalemia and high anion gap metabolic acidosis. S/p HD on 2/9 and 2/10 with HD scheduled again for 2/12.        Severe Acute Kidney Injury   CKD Stage 3   Patient's baseline Cr 1.4-1.76 during recent hospitalization, Cr on admission 5.15. Improved to 3.4 s/p HD x2. Nephrology team following, most likely etiology metformin associated lactic acidosis (ALIYA). Patient has been taking metformin 1000 mg BID prior to admission, not renally dosed.  - Admit to inpatient   - Nephrology consulted              - HD 2/9, 2/10, and scheduled again for tomorrow 2/12  - complement C3/C4, ANCA, MIGUEL              - hold PTA antihypertensives   - possible kidney biopsy for diagnosis   - Monitor daily BMP and urinary output  - Avoid nephrotoxins, renally dose medications   - Strict I&O's, daily weights      Hyperkalemia, resolved  K+ of 6.3 on admission with peaked T waves and QRS widening on EKG. S/p calcium gluconate, insulin, dextrose, and sodium bicarb. K now normalized to 4.3.  - HD with K2 bath   - cardiac tele   - renal diet      High anion gap metabolic acidosis, resolved    Lactic Acidosis, improving    Leukocytosis, improving   ? Community Acquired Pneumonia, Right Middle Lobe  Urinary Tract infection, E. Coli   Lactic acid of 6.4 on arrival, down to 5.4 on recheck. ABG with pH 7.03, CO2 37, bicarb 10. WBC elevated to 23 on arrival, down to 11.9 today. Receiving IV antibiotics.  "Concerning for metformin associated lactic acidosis (ALIYA). Patient has been taking metformin 1000 mg BID prior to admission, not renally dosed. Non-contract chest CT showing \"Clustered nodularity in the right middle lobe which is favored infectious/inflammatory.\" UA suspicious for infection, culture growing >10k CFU's E. Coli, pan sensitive to antibiotics.   - Nephrology consulted  - HD as above   - Discontinue metformin indefinitely and do NOT resume on discharge   - Transitioned to po antibiotics with levaquin 250 mg Q48 hours due to HD, completed day 2 of antibiotics today  - blood cultures pending, NGTD   - follow up chest CT in 3 months to ensure resolution      Hyponatremia   Na 127 on admission. Improved to 132 with dialysis. 133 today.   - daily BMP      HTN   Normotensive today, asymptomatic.   - HOLD PTA antihypertensives including diovan, hydrochlorothiazide and amlodipine   - Hydralazine 25 mg Q8H for SBP >160     Macrocytic Anemia   Hgb 10.4 on admission likely related to hemoconcentration, 8.5 today. No signs of active bleed.  - daily CBC     Atrial Flutter   New as of recent hospitalization. In A flutter today. Patient had an echo 2/2/24 with normal LV function without wall motion abnormalities.  He would be a candidate for full anticoagulation but patient declined given history of recurrent falls. Plan to follow up with outpatient EP cardiology.  - cardiac tele      Type 2 DM   Most recent A1c of 6.7%. Blood glucose on admission 83.   - hold PTA metformin indefinitely   - medium sliding scale insulin   - monitor with daily BMP         Chronic Problems:  - HLD: continue PTA statin  - diabetic neuropathy: HOLD PTA Gabapentin            Diet: Combination Diet Regular Diet Adult; Renal Diet (dialysis)    DVT Prophylaxis: Pneumatic Compression Devices  Gilbert Catheter: Not present  Fluids: bicarb drip at 150ml/hr   Lines: None     Cardiac Monitoring: None  Code Status: Full Code      Clinically " Significant Risk Factors              # Hypoalbuminemia: Lowest albumin = 2.8 g/dL at 2/11/2024  4:47 AM, will monitor as appropriate     # Hypertension: Noted on problem list       # DMII: A1C = 6.7 % (Ref range: <5.7 %) within past 6 months, PRESENT ON ADMISSION        # Financial/Environmental Concerns:           Disposition Plan      Expected Discharge Date: 02/13/2024    Discharge Delays: Placement - TCU    Discharge Comments: HD, WOC 2/12. TCU @ D/C [place]        The patient's care was discussed with the Attending Physician, Dr. Siegel, Bedside Nurse, and Patient.    Joann Leigh,  PGY2  Hospitalist Service  [facility]  Securely message with [employer] (more info)  Text page via [employer] Paging/Directory   ______________________________________________________________________    Interval History   No acute overnight events. Sitting up in chair eating breakfast today during visit. Reports that working with PT to get up in the recliner went well and they found him a brace for his foot drop which he very much appreciates. Will dialyze tomorrow.       Physical Exam   Vital Signs: Temp: 97.7  F (36.5  C) Temp src: Oral BP: 128/57 Pulse: 72   Resp: 12 SpO2: 100 % O2 Device: Nasal cannula Oxygen Delivery: 2 LPM  Weight: 152 lbs 15.99 oz  Constitutional: awake, alert, cooperative, no apparent distress, and appears stated age, sitting up in recliner  Eyes: Lids and lashes normal, extra ocular muscles intact, sclera clear, conjunctiva normal  ENT: normocepalic, without obvious abnormality  Respiratory: No increased work of breathing, good air exchange, clear to auscultation bilaterally, no crackles or wheezing  Cardiovascular: Irregular heart rhythm, A flutter on bedside cardiac tele   GI: Soft, non-distended, non-tender, no masses palpated  Skin: normal skin color, texture, turgor  Musculoskeletal: no lower extremity pitting edema present, right ankle brace in  place      Medical Decision Making       Please see A&P for additional details of medical decision making.      Data   ------------------------- PAST 24 HR DATA REVIEWED -----------------------------------------------    I have personally reviewed the following data over the past 24 hrs:    11.9 (H)  \   8.5 (L)   / 370     133 (L) 94 (L) 29.7 (H) /  135 (H)   4.3 27 3.42 (H) \     ALT: N/A AST: N/A AP: N/A TBILI: N/A   ALB: 2.8 (L) TOT PROTEIN: N/A LIPASE: N/A       Imaging results reviewed over the past 24 hrs:   No results found for this or any previous visit (from the past 24 hour(s)).

## 2024-02-11 NOTE — PLAN OF CARE
Pt is A&Ox4. Pt denied pain during shift thus far. Mepilex on coccyx, dressing is CDI. Q2 turns completed as tolerated, pt refused repositioning a couple times throughout the night. A2 with walker for transferring. Saline locked. No BM or void this shift, pt on hemodialysis, next run scheduled for Monday. Education on medication administration, alarms, and use of call-light to reduce risk for falls and injury. VSS on 2L O2 while sleeping, denies chest pain, shortness of breath, and nausea.

## 2024-02-11 NOTE — PROGRESS NOTES
[facility]  [facility]   Follow up    Pa Gacría   MRN: [id number]  : 1945   DOA: 2024   CC: ARF      Assessment and Plan:  78 year old male    ARF/CKD; has underlying CKD with CR 1.7 in July and CR running between 1.4 and 1.8 during  ; hospitalization--noted to be rising on  and further to 5.2 yesterday; started on dialysis and s/p 2 marcel etiology of both ARF/subacute/ CKD is unclear (maybe some dehydration at different points but seems out of proportion to degree and acuity of ARF); serology pending; d/w risk and benefits of a biopsy; would favor for diagnosis and prognosis; plan HD tomorrow; could try for bx tomorrow afternoon or Tuesday; pt would like to think about it  DM; on insulin; metforin on hold  Hyperlipid; statin  Anemia; following hgb  A flutter; on chronic a/C  Acidosis; resolved with bicarb gtt and HD; thought related to metformin (elevated lactaate) and ARF; off metformin  Hyperkalemia; resolved with HD  Hyponatremia: follow with HD  HTN; on norvasc; bp ok; avoid HoTN  Volume status; seems ok so not doing UF with HD; ?was dehydrated at some point prior to admiit           Subjective  No complaints or events; no issue with dialysis yesterday    Vital signs in last 24 hours  Temp:  [97.7  F (36.5  C)-98.3  F (36.8  C)] 97.7  F (36.5  C)  Pulse:  [65-88] 72  Resp:  [12-21] 12  BP: (113-151)/(57-77) 128/57  SpO2:  [95 %-100 %] 100 %      Intake/Output last 3 shifts  I/O last 3 completed shifts:  In: 900 [P.O.:400; I.V.:500]  Out: 190 [Urine:190]  Intake/Output this shift:  I/O this shift:  In: 400 [P.O.:400]  Out: -     Physical Exam  Alert/oriented x 3, awake, NAD  CV: RRR without murmur or rub  Lung: clear and equal; no extra sounds  Ab: soft and NT; not distended; normal bs  Ext: no edema and well perfused  Skin; no rash    Pertinent Labs     Last Renal Panel:  Sodium   Date Value Ref Range Status   2024 133  (L) 135 - 145 mmol/L Final     Comment:     Reference intervals for this test were updated on 09/26/2023 to more accurately reflect our healthy population. There may be differences in the flagging of prior results with similar values performed with this method. Interpretation of those prior results can be made in the context of the updated reference intervals.      Potassium   Date Value Ref Range Status   02/11/2024 4.3 3.4 - 5.3 mmol/L Final   06/10/2021 3.9 3.5 - 5.0 mmol/L Final     Chloride   Date Value Ref Range Status   02/11/2024 94 (L) 98 - 107 mmol/L Final   06/10/2021 114 (H) 98 - 107 mmol/L Final     Carbon Dioxide (CO2)   Date Value Ref Range Status   02/11/2024 27 22 - 29 mmol/L Final   06/10/2021 23 22 - 31 mmol/L Final     Anion Gap   Date Value Ref Range Status   02/11/2024 12 7 - 15 mmol/L Final   06/10/2021 5 5 - 18 mmol/L Final     Glucose   Date Value Ref Range Status   06/10/2021 131 (H) 70 - 125 mg/dL Final     GLUCOSE BY METER POCT   Date Value Ref Range Status   02/11/2024 135 (H) 70 - 99 mg/dL Final     Urea Nitrogen   Date Value Ref Range Status   02/11/2024 29.7 (H) 8.0 - 23.0 mg/dL Final   06/10/2021 22 8 - 28 mg/dL Final     Creatinine   Date Value Ref Range Status   02/11/2024 3.42 (H) 0.67 - 1.17 mg/dL Final     GFR Estimate   Date Value Ref Range Status   02/11/2024 18 (L) >60 mL/min/1.73m2 Final   06/10/2021 >60 >60 mL/min/1.73m2 Final     Calcium   Date Value Ref Range Status   02/11/2024 8.0 (L) 8.8 - 10.2 mg/dL Final     Phosphorus   Date Value Ref Range Status   02/11/2024 4.1 2.5 - 4.5 mg/dL Final     Albumin   Date Value Ref Range Status   02/11/2024 2.8 (L) 3.5 - 5.2 g/dL Final     Recent Labs   Lab 02/11/24  0447 02/10/24  0552 02/09/24  0941 02/06/24  0630   HGB 8.5* 7.7* 10.4* 8.6*          I reviewed all lab results  Maddy Barksdale MD

## 2024-02-11 NOTE — CONSULTS
Care Management Initial Consult    General Information  Assessment completed with: Patient,    Type of CM/SW Visit: Initial Assessment    Primary Care Provider verified and updated as needed: Yes   Readmission within the last 30 days: previous discharge plan unsuccessful   Return Category: Progression of disease  Reason for Consult: discharge planning  Advance Care Planning:          Communication Assessment  Patient's communication style: spoken language (English or Bilingual)    Hearing Difficulty or Deaf: yes   Wear Glasses or Blind: yes    Cognitive  Cognitive/Neuro/Behavioral: WDL                      Living Environment:   People in home: alone     Current living Arrangements: house      Able to return to prior arrangements:       Family/Social Support:  Care provided by: self  Provides care for: no one  Marital Status: Single  Children, Sibling(s)          Description of Support System: Supportive       Current Resources:   Patient receiving home care services:  (currently in Transitional Care)     Community Resources:    Equipment currently used at home: cane, straight, walker, rolling, other (see comments) (FWW and 4WW)  Supplies currently used at home:      Employment/Financial:  Employment Status:          Financial Concerns: none   Referral to Financial Worker: No     Does the patient's insurance plan have a 3 day qualifying hospital stay waiver?      Lifestyle & Psychosocial Needs:  Social Determinants of Health     Food Insecurity: Not on file   Depression: Not on file   Housing Stability: Not on file   Tobacco Use: Low Risk  (2/9/2024)    Patient History     Smoking Tobacco Use: Never     Smokeless Tobacco Use: Never     Passive Exposure: Not on file   Financial Resource Strain: Not on file   Alcohol Use: Not on file   Transportation Needs: Not on file   Physical Activity: Not on file   Interpersonal Safety: Not on file   Stress: Not on file   Social Connections: Not on file     Functional  Status:  Prior to admission patient needed assistance:   Yes, currently at Transitional Care facility     Mental Health Status:  Mental Health Status: No Current Concerns       Chemical Dependency Status:        Values/Beliefs:  Spiritual, Cultural Beliefs, Latter day Practices, Values that affect care:               Additional Information:  Met with patient to review role of care management, progression of care and possible need for services at discharge, including OP services, home care, or skilled nursing care.   Pt states he was recently hospitalized here and was discharged to [facility] TCU, and was sent back to hospital from TCU.  He believes he does not have a bed hold at TCU.    Pt reports his baseline is living alone in a house and independent with ADLs/IADLs, including bathing and driving. He normally uses a cane or 2WW in house and uses a 4WW outside.   Discussed PT and OT recommendation for TCU placement again and pt is agreeable.  He prefers to return to [facility] if possible, or would like a facility in [place].    Pt downgraded out of ICU today.  Pt has had 2 dialysis runs in hospital and will have third tomorrow.  He has never had dialysis in the past.  Nephrology is following and Care Management will monitor their recommendations of potential dialysis needs long term.    TCU referrals sent and are pending.    Call placed to [facility] to check on bed hold status and message left requesting return call.    Micaela Snow RN

## 2024-02-11 NOTE — PROGRESS NOTES
02/11/24 0728   Appointment Info   Signing Clinician's Name / Credentials (OT) Chacha Manuel, OTR/L   Living Environment   People in Home alone   Current Living Arrangements house   Home Accessibility stairs to enter home;stairs within home   Number of Stairs, Main Entrance 1   Stair Railings, Main Entrance none   Number of Stairs, Within Home, Primary greater than 10 stairs   Stair Railings, Within Home, Primary railing on left side (ascending)   Living Environment Comments Pt lives in a multilevel house alone; standard toilet and WIS.   Self-Care   Usual Activity Tolerance moderate   Current Activity Tolerance fair   Equipment Currently Used at Home grab bar, tub/shower;shower chair;walker, rolling;cane, straight  (cane inside, 4WW outside)   Fall history within last six months yes   Number of times patient has fallen within last six months 4   Activity/Exercise/Self-Care Comment Ind with I/ADLs prior to Feb 2024   Instrumental Activities of Daily Living (IADL)   Previous Responsibilities meal prep;housekeeping;laundry;shopping;yardwork;medication management;finances;driving   IADL Comments Pt reported granddtr lives in [place], but mostly on his own.   General Information   Onset of Illness/Injury or Date of Surgery 02/09/24   Referring Physician Joann Leigh,    Patient/Family Therapy Goal Statement (OT) improve balance and go home   Additional Occupational Profile Info/Pertinent History of Current Problem 78 year old male with history of HTN, HLD, DM, atrial flutter not anticoagulated, partial paraplegia with neurogenic bowel and bladder as a complication of previous back surgery with right foot drop who presents with new hyperkalemia. Recently hospitalized for trochanteric bursitis and returned from TCU to the ED.   Existing Precautions/Restrictions fall;other (see comments);oxygen therapy device and L/min  (AFO for R LE; 1L O2 via NC (new))   Cognitive Status Examination   Orientation Status  orientation to person, place and time   Visual Perception   Visual Impairment/Limitations corrective lenses full-time   Sensory   Sensory Quick Adds sensation intact   Pain Assessment   Patient Currently in Pain No   Posture   Posture not impaired   Range of Motion Comprehensive   General Range of Motion bilateral upper extremity ROM WFL   Strength Comprehensive (MMT)   General Manual Muscle Testing (MMT) Assessment upper extremity strength deficits identified   Comment, General Manual Muscle Testing (MMT) Assessment grossly assessed 3/5 B UEs   Upper Extremity (Manual Muscle Testing)   Upper Extremity: Manual Muscle Testing (MMT) left shoulder strength deficit;right shoulder strength deficit   Muscle Tone Assessment   Muscle Tone Quick Adds No deficits were identified   Coordination   Upper Extremity Coordination No deficits were identified   Bed Mobility   Bed Mobility supine-sit   Supine-Sit Yates (Bed Mobility) moderate assist (50% patient effort)   Assistive Device (Bed Mobility) bed rails   Transfers   Transfers bed-chair transfer   Transfer Skill: Bed to Chair/Chair to Bed   Bed-Chair Yates (Transfers) moderate assist (50% patient effort)   Assistive Device (Bed-Chair Transfers) rolling walker;other (see comments)  (FWW & R AFO)   Balance   Balance Assessment standing dynamic balance   Standing Balance: Dynamic minimal assist   Position/Device Used, Standing Balance walker, front-wheeled;orthosis   Activities of Daily Living   BADL Assessment/Intervention lower body dressing   Lower Body Dressing Assessment/Training   Yates Level (Lower Body Dressing) maximum assist (25% patient effort)   Position (Lower Body Dressing) edge of bed sitting   Clinical Impression   Criteria for Skilled Therapeutic Interventions Met (OT) Yes, treatment indicated   OT Diagnosis decreased fxl ind   Influenced by the following impairments acute renal failure   OT Problem List-Impairments impacting ADL activity  tolerance impaired;balance;flexibility;mobility;strength   Assessment of Occupational Performance 3-5 Performance Deficits   Identified Performance Deficits dressing, toileting, bathing, functional mobility, bed mobility   Planned Therapy Interventions (OT) ADL retraining;bed mobility training;transfer training   Clinical Decision Making Complexity (OT) detailed assessment/moderate complexity   Risk & Benefits of therapy have been explained care plan/treatment goals reviewed;participants voiced agreement with care plan;participants included;patient   OT Total Evaluation Time   OT Eval, Moderate Complexity Minutes (87563) 10   OT Goals   Therapy Frequency (OT) Daily   OT Predicted Duration/Target Date for Goal Attainment 02/15/24   OT Goals Lower Body Dressing;Toilet Transfer/Toileting;Aerobic Activity   OT: Lower Body Dressing Modified independent;using adaptive equipment;including set-up/clothing retrieval   OT: Toilet Transfer/Toileting Modified independent;toilet transfer;cleaning and garment management;using adaptive equipment   OT: Perform aerobic activity with stable cardiovascular response continuous activity;10 minutes   Self-Care/Home Management   Self-Care/Home Mgmt/ADL, Compensatory, Meal Prep Minutes (31114) 26   Symptoms Noted During/After Treatment (Meal Preparation/Planning Training) fatigue;significant change in vital signs  (O2 sats dropped to 74% during activity; increased to 90% within 30 secs of PLB.)   Treatment Detail/Skilled Intervention Pt completes bed mob supine>sit with HOB elevated use of bed rail and verbal cues; MOD A. While seated EOB, pt completed LB dressing with MAX A to doff/don socks and AFO. Sit<>stand from EOB with FWW and MOD A; 3 reps and verbal cues for technique. Pt feeling weak this morning but able to complete safely, with OTR educ, cues, and A for safety. Pt took steps at EOB with FWW to chair with MOD A; verbal cues for safety/technique. Pt seated in chair at end of  session, all needs within reach.   OT Discharge Planning   OT Plan 2/15: LB dressing with AFO, toileting, increase act deo/standing ADLs   OT Discharge Recommendation (DC Rec) Transitional Care Facility   OT Rationale for DC Rec Pt displayed decreased fxl ind; lives alone and must complete all I/ADLs MOD I; rec TCU to increase fxl ind and safety prior to d/c home.   OT Brief overview of current status MOD A txrs; MAX A ADLs   Total Session Time   Timed Code Treatment Minutes 26   Total Session Time (sum of timed and untimed services) 36

## 2024-02-11 NOTE — CARE PLAN
Family education completed:Yes    Report given to: Steffany RENEE RN    Time of transfer: 1430n    Transferred to: 2N room 234    Belongings sent:Yes    Family updated:Yes    Reviewed pertinent information from EPIC (EMAR/Clinical Summary/Flowsheets):Yes    Head-to-toe assessment with receiving RN:Yes    Recommendations (e.g. Family needs/recent issues/things to watch for): n/a

## 2024-02-12 ENCOUNTER — APPOINTMENT (OUTPATIENT)
Dept: OCCUPATIONAL THERAPY | Facility: CLINIC | Age: 79
DRG: 682 | End: 2024-02-12
Payer: COMMERCIAL

## 2024-02-12 ENCOUNTER — APPOINTMENT (OUTPATIENT)
Dept: PHYSICAL THERAPY | Facility: CLINIC | Age: 79
DRG: 682 | End: 2024-02-12
Payer: COMMERCIAL

## 2024-02-12 LAB
ALBUMIN MFR UR ELPH: 10.8 MG/DL
ANA SER QL IF: NEGATIVE
ANCA AB PATTERN SER IF-IMP: NORMAL
ANION GAP SERPL CALCULATED.3IONS-SCNC: 12 MMOL/L (ref 7–15)
ATRIAL RATE - MUSE: 241 BPM
ATRIAL RATE - MUSE: 95 BPM
BUN SERPL-MCNC: 34.6 MG/DL (ref 8–23)
C-ANCA TITR SER IF: NORMAL {TITER}
C3 SERPL-MCNC: 109 MG/DL (ref 81–157)
C4 SERPL-MCNC: 23 MG/DL (ref 13–39)
CALCIUM SERPL-MCNC: 8.3 MG/DL (ref 8.8–10.2)
CHLORIDE SERPL-SCNC: 94 MMOL/L (ref 98–107)
CREAT SERPL-MCNC: 4.42 MG/DL (ref 0.67–1.17)
CREAT UR-MCNC: 7.1 MG/DL
DEPRECATED HCO3 PLAS-SCNC: 26 MMOL/L (ref 22–29)
DIASTOLIC BLOOD PRESSURE - MUSE: 63 MMHG
DIASTOLIC BLOOD PRESSURE - MUSE: NORMAL MMHG
EGFRCR SERPLBLD CKD-EPI 2021: 13 ML/MIN/1.73M2
ERYTHROCYTE [DISTWIDTH] IN BLOOD BY AUTOMATED COUNT: 13.4 % (ref 10–15)
FERRITIN SERPL-MCNC: 113 NG/ML (ref 31–409)
GLUCOSE BLDC GLUCOMTR-MCNC: 120 MG/DL (ref 70–99)
GLUCOSE BLDC GLUCOMTR-MCNC: 124 MG/DL (ref 70–99)
GLUCOSE BLDC GLUCOMTR-MCNC: 144 MG/DL (ref 70–99)
GLUCOSE BLDC GLUCOMTR-MCNC: 147 MG/DL (ref 70–99)
GLUCOSE BLDC GLUCOMTR-MCNC: 181 MG/DL (ref 70–99)
GLUCOSE BLDC GLUCOMTR-MCNC: 187 MG/DL (ref 70–99)
GLUCOSE BLDC GLUCOMTR-MCNC: 200 MG/DL (ref 70–99)
GLUCOSE SERPL-MCNC: 115 MG/DL (ref 70–99)
HCT VFR BLD AUTO: 23.8 % (ref 40–53)
HGB BLD-MCNC: 8.1 G/DL (ref 13.3–17.7)
INTERPRETATION ECG - MUSE: NORMAL
INTERPRETATION ECG - MUSE: NORMAL
IRON BINDING CAPACITY (ROCHE): 158 UG/DL (ref 240–430)
IRON SATN MFR SERPL: 32 % (ref 15–46)
IRON SERPL-MCNC: 50 UG/DL (ref 61–157)
MCH RBC QN AUTO: 32.3 PG (ref 26.5–33)
MCHC RBC AUTO-ENTMCNC: 34 G/DL (ref 31.5–36.5)
MCV RBC AUTO: 95 FL (ref 78–100)
P AXIS - MUSE: 72 DEGREES
P AXIS - MUSE: NORMAL DEGREES
PLATELET # BLD AUTO: 362 10E3/UL (ref 150–450)
POTASSIUM SERPL-SCNC: 4.4 MMOL/L (ref 3.4–5.3)
PR INTERVAL - MUSE: NORMAL MS
PR INTERVAL - MUSE: NORMAL MS
PROT/CREAT 24H UR: 1.52 MG/MG CR (ref 0–0.2)
QRS DURATION - MUSE: 102 MS
QRS DURATION - MUSE: 92 MS
QT - MUSE: 350 MS
QT - MUSE: 436 MS
QTC - MUSE: 439 MS
QTC - MUSE: 446 MS
R AXIS - MUSE: 28 DEGREES
R AXIS - MUSE: 36 DEGREES
RBC # BLD AUTO: 2.51 10E6/UL (ref 4.4–5.9)
SODIUM SERPL-SCNC: 132 MMOL/L (ref 135–145)
SYSTOLIC BLOOD PRESSURE - MUSE: 148 MMHG
SYSTOLIC BLOOD PRESSURE - MUSE: NORMAL MMHG
T AXIS - MUSE: 19 DEGREES
T AXIS - MUSE: 25 DEGREES
TOTAL PROTEIN SERUM FOR ELP: 4.5 G/DL (ref 6.4–8.3)
VENTRICULAR RATE- MUSE: 63 BPM
VENTRICULAR RATE- MUSE: 95 BPM
WBC # BLD AUTO: 9.6 10E3/UL (ref 4–11)

## 2024-02-12 PROCEDURE — 99232 SBSQ HOSP IP/OBS MODERATE 35: CPT

## 2024-02-12 PROCEDURE — 90935 HEMODIALYSIS ONE EVALUATION: CPT

## 2024-02-12 PROCEDURE — 97535 SELF CARE MNGMENT TRAINING: CPT | Mod: GO | Performed by: OCCUPATIONAL THERAPIST

## 2024-02-12 PROCEDURE — 250N000013 HC RX MED GY IP 250 OP 250 PS 637

## 2024-02-12 PROCEDURE — 85027 COMPLETE CBC AUTOMATED: CPT

## 2024-02-12 PROCEDURE — 97110 THERAPEUTIC EXERCISES: CPT | Mod: GP

## 2024-02-12 PROCEDURE — 80048 BASIC METABOLIC PNL TOTAL CA: CPT

## 2024-02-12 PROCEDURE — 82043 UR ALBUMIN QUANTITATIVE: CPT | Performed by: INTERNAL MEDICINE

## 2024-02-12 PROCEDURE — 84156 ASSAY OF PROTEIN URINE: CPT | Performed by: INTERNAL MEDICINE

## 2024-02-12 PROCEDURE — 99232 SBSQ HOSP IP/OBS MODERATE 35: CPT | Mod: GC

## 2024-02-12 PROCEDURE — G0463 HOSPITAL OUTPT CLINIC VISIT: HCPCS | Mod: 25

## 2024-02-12 PROCEDURE — 120N000001 HC R&B MED SURG/OB

## 2024-02-12 PROCEDURE — 36415 COLL VENOUS BLD VENIPUNCTURE: CPT

## 2024-02-12 PROCEDURE — 97116 GAIT TRAINING THERAPY: CPT | Mod: GP

## 2024-02-12 PROCEDURE — 83550 IRON BINDING TEST: CPT

## 2024-02-12 PROCEDURE — 82728 ASSAY OF FERRITIN: CPT

## 2024-02-12 RX ORDER — HYDRALAZINE HYDROCHLORIDE 10 MG/1
10 TABLET, FILM COATED ORAL 2 TIMES DAILY PRN
Status: DISCONTINUED | OUTPATIENT
Start: 2024-02-12 | End: 2024-02-14 | Stop reason: HOSPADM

## 2024-02-12 RX ADMIN — ACETAMINOPHEN 650 MG: 325 TABLET ORAL at 22:09

## 2024-02-12 RX ADMIN — ACETAMINOPHEN 650 MG: 325 TABLET ORAL at 11:04

## 2024-02-12 RX ADMIN — SIMVASTATIN 20 MG: 20 TABLET, FILM COATED ORAL at 22:09

## 2024-02-12 ASSESSMENT — ACTIVITIES OF DAILY LIVING (ADL)
ADLS_ACUITY_SCORE: 37
ADLS_ACUITY_SCORE: 38
ADLS_ACUITY_SCORE: 37
ADLS_ACUITY_SCORE: 38
ADLS_ACUITY_SCORE: 38
ADLS_ACUITY_SCORE: 37
ADLS_ACUITY_SCORE: 37
ADLS_ACUITY_SCORE: 38
ADLS_ACUITY_SCORE: 37
ADLS_ACUITY_SCORE: 38

## 2024-02-12 NOTE — PLAN OF CARE
Problem: Adult Inpatient Plan of Care  Goal: Plan of Care Review  Outcome: Progressing     Problem: UTI (Urinary Tract Infection)  Goal: Improved Infection Symptoms  Outcome: Progressing   Goal Outcome Evaluation:    A&Ox4, VSS on RA.  Reported some hip pain, managed with PRN tylenol.  Assist of 1 w/w and GB.  Patient to have right boot on while ambulating.  Tele NSR.  Dialysis started, going until about 1655.

## 2024-02-12 NOTE — PROGRESS NOTES
"    RENAL PROGRESS NOTE    CC:ROXIE    ASSESSMENT & PLAN:     PLAN:   -HD today, then MWF thereafter, follow for s/s of renal recovery  -Discussed and recommended Renal Bx for definitive ROXIE/CKD Dx, pt agreeable and would like to proceed with Renal Bx, entered Renal Bx orders. Not on AC, and BP stable.   -check Iron panel, ferr  -BMP daily.     ARF/CKD: has underlying CKD with CR 1.7 in July and CR running between 1.4 and 1.8 during  recent. hospitalization--noted to be rising on 2/6 and further to 5.2 2/11, then started on dialysis.  Etiology of both ARF/subacute/ CKD is unclear (maybe some dehydration at different points but seems out of proportion to degree and acuity of ARF). Serologies pending. d/w ptrisk and benefits of a biopsy, would favor Bx for diagnosis and prognosis. Discussed risk/benefit again for renal biopsy, pt would like to proceed with BX, entered orders/Not on AC/BP stable.       DM2: on insulin. metforin on hold.    HLD: on statin    Anemia:  following hgb.    A flutter: on ASA PTA    Metabolic Acidosis: Resolved with bicarb gtt and HD.  thought related to metformin (elevated lactate) and ARF. Now off metformin    Hyperkalemia: resolved with HD    Hyponatremia: follow with HD    Pseudo-hypocalcemia: Ca+ corrected for hypoalbuminemia WNL    HTN: on norvasc; bp ok; avoid HoTN    Volume status: seems ok, UF with HD; ?was dehydrated at some point prior to admiit       SUBJECTIVE:    Pt new to me today   He is sitting up in bed, appears comfortable  Discussed with HD RN pt \"felt like he was floating on dialysis slightly elevated, UF turned off and symptoms improved\" BP slightly elevated, will add PRN hydralazine. Will resume mild UF, if pt BP gets worse or symptoms do not improve or get worse will have HS RN return blood and end HD earlier, discussed with HD rN and pt at bedside.   Will plan for dialysis today, then MWF thereafter  Denies n, v, c, d, sob, fever, rash or CP  +LLE  Tolerating HD well  We " discussed in depth level of ROXIE/CKD, etiology unclear, Risk/Benefit of renal Bx, Bx favorable for Dx and prognosis. PT would like to proceed with renal Bx. No on AC/ and BP okay. Entered order for IR consult  HD today  Discussed labs/plan and answered all questions.       OBJECTIVE:  Physical Exam   Temp: 98.2  F (36.8  C) Temp src: Oral BP: 133/69 Pulse: 87   Resp: 16 SpO2: 94 % O2 Device: None (Room air) Oxygen Delivery: 2 LPM  Vitals:    02/09/24 0909 02/10/24 0600 02/12/24 0413   Weight: 68.9 kg (152 lb) 69.4 kg (153 lb) 80.8 kg (178 lb 2.1 oz)     Vital Signs with Ranges  Temp:  [97.7  F (36.5  C)-98.4  F (36.9  C)] 98.2  F (36.8  C)  Pulse:  [66-87] 87  Resp:  [12-16] 16  BP: (128-141)/(57-69) 133/69  SpO2:  [94 %-100 %] 94 %  I/O last 3 completed shifts:  In: 520 [P.O.:520]  Out: 500 [Urine:500]    Patient Vitals for the past 72 hrs:   Weight   02/12/24 0413 80.8 kg (178 lb 2.1 oz)   02/10/24 0600 69.4 kg (153 lb)   02/09/24 0909 68.9 kg (152 lb)     Intake/Output Summary (Last 24 hours) at 2/12/2024 0844  Last data filed at 2/12/2024 0417  Gross per 24 hour   Intake 120 ml   Output 500 ml   Net -380 ml       PHYSICAL EXAM:  GEN: NAD, aox3, sister and niece at bedside  CV: RRR   Lung: clear and equal  Ab: soft and NT  Ext: +1-2 LLE BL and well perfused  Skin: no rash  Psych: normal affect  Neuro: No asterixis, grossly intact.       LABORATORY STUDIES:     Recent Labs   Lab 02/12/24  0603 02/11/24  0447 02/10/24  0552 02/09/24  0941 02/06/24  0630   WBC 9.6 11.9* 12.7* 23.1* 11.9*   RBC 2.51* 2.63* 2.37* 3.19* 2.66*   HGB 8.1* 8.5* 7.7* 10.4* 8.6*   HCT 23.8* 24.8* 22.3* 32.3* 26.7*    370 320 412 300       Basic Metabolic Panel:  Recent Labs   Lab 02/12/24  0831 02/12/24  0603 02/12/24  0228 02/11/24  2059 02/11/24  1729 02/11/24  1252 02/11/24  0758 02/11/24  0447 02/10/24  0825 02/10/24  0552 02/09/24  1443 02/09/24  1422 02/09/24  1342 02/09/24  1312 02/09/24  1248 02/09/24  0941 02/08/24  0605  02/06/24  0630   NA  --  132*  --   --   --   --   --  133*  --  132*  --   --   --   --   --  127* 131* 133*   POTASSIUM  --  4.4  --   --   --   --   --  4.3  --  4.5  --  5.1  --  5.2  --  6.3* 5.7* 5.7*   CHLORIDE  --  94*  --   --   --   --   --  94*  --  93*  --   --   --   --   --  92* 99 103   CO2  --  26  --   --   --   --   --  27  --  23  --   --   --   --   --  10* 16* 16*   BUN  --  34.6*  --   --   --   --   --  29.7*  --  47.5*  --   --   --   --   --  77.8* 70.3* 52.1*   CR  --  4.42*  --   --   --   --   --  3.42*  --  4.05*  --   --   --   --   --  5.15* 4.08* 2.54*   * 115* 120* 144* 127* 135*   < > 117*   < > 171*   < >  --    < >  --    < > 83 100* 132*   JAYRO  --  8.3*  --   --   --   --   --  8.0*  --  8.1*  --   --   --   --   --  8.6* 8.4* 8.4*    < > = values in this interval not displayed.       BRITTANYNo lab results found in last 7 days.     Recent Labs   Lab Test 02/12/24  0603 02/11/24  0447   WBC 9.6 11.9*   HGB 8.1* 8.5*    370       Personally reviewed current labs    Devora MCDOWELL-BC  [facility]  [phone]

## 2024-02-12 NOTE — PROGRESS NOTES
[facility]    Progress Note - Hospitalist Service       Date of Admission:  2/9/2024    Assessment & Plan   Pa García is a 78 year old male admitted on 2/9/2024. He has a history of HTN, HLD, DM, atrial flutter not anticoagulated, stage III chronic kidney disease, partial paraplegia with neurogenic bowel and bladder as a complication of previous back surgery with right foot drop who was recently admitted to  2/1/24- 2/4/24 for mechanical fall and generalized weakness. He is admitted now for acute renal failure with hyperkalemia and high anion gap metabolic acidosis. S/p HD on 2/9/24 and 2/10/24 with HD scheduled again for today 2/12/24. Planning on renal biopsy tomorrow.     Severe Acute Kidney Injury   CKD Stage 3   Patient's baseline Cr 1.4-1.76 during recent hospitalization, Cr on admission 5.15. Improved to 3.4 s/p HD x2. Nephrology team following, most likely etiology metformin associated lactic acidosis (ALIYA). Patient has been taking metformin 1000 mg BID prior to admission, not renally dosed.  -Admit to inpatient   -Nephrology consulted              -HD today, then MWF thereafter, follow for s/s of renal recovery  -Discussed and recommended Renal Bx for definitive ROXIE/CKD Dx, pt agreeable and would like to proceed with Renal Bx, entered Renal Bx orders. Not on AC, and BP stable.   -check Iron panel, ferr  -BMP daily.   -Monitor daily BMP and urinary output  -Avoid nephrotoxins, renally dose medications   -Strict I&O's, daily weights      Hyperkalemia, resolved  K+ of 6.3 on admission with peaked T waves and QRS widening on EKG. S/p calcium gluconate, insulin, dextrose, and sodium bicarb. K now normalized at 4.4 today.  -HD with K2 bath   -Renal diet      High anion gap metabolic acidosis, resolved    Lactic Acidosis, improving    Leukocytosis, resolved  ? Community Acquired Pneumonia, Right Middle Lobe  Urinary Tract infection, E. Coli   Lactic acid of 6.4 on arrival, down  "to 5.4 on recheck. ABG with pH 7.03, CO2 37, bicarb 10. WBC elevated to 23 on arrival, down to 9.6 today. Receiving IV antibiotics. Concerning for metformin associated lactic acidosis (ALIYA). Patient has been taking metformin 1000 mg BID prior to admission, not renally dosed. Non-contract chest CT showing \"Clustered nodularity in the right middle lobe which is favored infectious/inflammatory.\" UA suspicious for infection, culture growing >10k CFU's E. Coli, pan sensitive to antibiotics.   -Nephrology consulted, recommendations as above  -Transitioned to po antibiotics with levaquin 250 mg Q48 hours due to HD, next due 2/13/24  -Blood cultures pending, NGTD   -Follow up chest CT in 3 months to ensure resolution      Hyponatremia   Na 127 on admission. Improved to 132 with dialysis. 132 today.   -Daily BMP      HTN   Normotensive today, asymptomatic.   -HOLD PTA antihypertensives including diovan, hydrochlorothiazide and amlodipine   -Hydralazine 25 mg Q8H for SBP >160     Macrocytic Anemia   Hgb 10.4 on admission likely related to hemoconcentration, 8.1 today. No signs of active bleed. Iron studies notable for ferritin 113, TIBC 158, iron 50.  -Daily CBC     Atrial Flutter   New as of recent hospitalization. Patient had an echo 2/2/24 with normal LV function without wall motion abnormalities. He would be a candidate for full anticoagulation but patient declined given history of recurrent falls. Plan to follow up with outpatient EP cardiology.  -Discontinued cardiac tele on 2/12/24 given stability      Type 2 DM   Most recent A1c of 6.7%. Blood glucose on admission 83.   -Hold PTA metformin indefinitely   -Medium sliding scale insulin   -Monitor with daily BMP      Chronic Problems:  -HLD: continue PTA statin  -Diabetic neuropathy: HOLD PTA gabapentin         Diet: Combination Diet Regular Diet Adult; Renal Diet (dialysis)    DVT Prophylaxis: Pneumatic Compression Devices  Gilbert Catheter: Not present  Fluids: bicarb " "drip at 150ml/hr   Lines: None     Cardiac Monitoring: None  Code Status: Full Code      Clinically Significant Risk Factors              # Hypoalbuminemia: Lowest albumin = 2.8 g/dL at 2/11/2024  4:47 AM, will monitor as appropriate     # Hypertension: Noted on problem list       # DMII: A1C = 6.7 % (Ref range: <5.7 %) within past 6 months, PRESENT ON ADMISSION  # Overweight: Estimated body mass index is 26.31 kg/m  as calculated from the following:    Height as of this encounter: 1.753 m (5' 9\").    Weight as of this encounter: 80.8 kg (178 lb 2.1 oz)., PRESENT ON ADMISSION       # Financial/Environmental Concerns: none         Disposition Plan      Expected Discharge Date: 02/13/2024      Destination: home  Discharge Comments: HD, WOC 2/12.   TCU referrals pending        The patient's care was discussed with the attending physician, Dr. Kuhn.    SELIN BRAVO MD PGY1  Hospitalist Service  [facility]  Securely message with [email or web address] (more info)  Text page via [email or web address] Paging/Directory   ______________________________________________________________________    Interval History   No acute overnight events.  He is sitting upright in his chair.  Endorses urinating a few times yesterday, having bowel movement this morning.  Reports bowel movement was loose but this is at his baseline.  Denies any nausea, vomiting, bloating, extremity swelling.  Also denies any chest pain, lightheadedness, dizziness.  Does endorse 2-3-week history of intermittent right lower quadrant discomfort.  Denies any fevers or chills.  Unsure if pain is associated with mealtime at all.  Notes that his cousin recently had an appendicitis like infection so he wonders if he is at risk.  Denies any history of abdominal surgeries.      Physical Exam   Vital Signs: Temp: 98.4  F (36.9  C) Temp src: Oral BP: (!) 148/68 Pulse: 87   Resp: 18 SpO2: 95 % O2 Device: None (Room air)    Weight: 178 lbs 2.11 oz  Constitutional: " awake, alert, cooperative, no apparent distress, and appears stated age, sitting up in recliner  HEENT: normocepalic, atraumatic, no rhinorrhea, moist mucus membranes  Respiratory: no increased work of breathing, good air exchange, clear to auscultation bilaterally, no crackles or wheezing  Cardiovascular: irregular heart rhythm  GI: soft, non-distended, non-tender, no masses palpated  Skin: normal skin color, texture, turgor  Musculoskeletal: no lower extremity pitting edema present, right ankle brace in place      Medical Decision Making       Please see A&P for additional details of medical decision making.      Data   ------------------------- PAST 24 HR DATA REVIEWED -----------------------------------------------    I have personally reviewed the following data over the past 24 hrs:    9.6  \   8.1 (L)   / 362     132 (L) 94 (L) 34.6 (H) /  124 (H)   4.4 26 4.42 (H) \     Ferritin:  113 % Retic:  N/A LDH:  N/A       Imaging results reviewed over the past 24 hrs:   No results found for this or any previous visit (from the past 24 hour(s)).

## 2024-02-12 NOTE — CONSULTS
Care Management Follow Up    Length of Stay (days): 3    Expected Discharge Date: 02/13/2024     Concerns to be Addressed:       Patient plan of care discussed at interdisciplinary rounds: Yes    Anticipated Discharge Disposition: Transitional Care     Anticipated Discharge Services:    Anticipated Discharge DME:      Patient/family educated on Medicare website which has current facility and service quality ratings: yes  Education Provided on the Discharge Plan: Yes  Patient/Family in Agreement with the Plan: yes    Referrals Placed by CM/SW: Post Acute Facilities  Private pay costs discussed: Not applicable    Additional Information:  See below     Za Parmar RN        Spoke to Zoraida at [facility]. Says patient does not have a bed hold but they can likely accept back pending bed availability at time of discharge. CM will follow

## 2024-02-12 NOTE — PLAN OF CARE
Goal Outcome Evaluation:    Care provided from [id number]. A & O x 4. VSS. RA. Denies pain. Right internal jugular CDI. IV is SL. Blood sugar 127. Verbal from MD to keep patient on tele at this time and they will reassess in the morning. Tele SR with a prolonged QTc. Renal diet. Strict I & O's. Assist of 1 with walker/GB. Alarms on for safety. Call light education provided. Discharge pending ongoing clinical improvement to TCU.    Problem: Electrolyte Imbalance  Goal: Electrolyte Balance  Outcome: Progressing     Problem: Hemodialysis  Goal: Absence of Infection Signs and Symptoms  Outcome: Progressing     Problem: UTI (Urinary Tract Infection)  Goal: Improved Infection Symptoms  Outcome: Progressing

## 2024-02-12 NOTE — PLAN OF CARE
Goal Outcome Evaluation:  Patient AO x4. Room air. Reported bottom pain 4/10. PRN tylenol administered. VSS. Assist of one to the bathroom with a walker.   Tele: Normal sinus. (At times: Afib, PVC, dysrhythmia, prolonged Qtc).       Problem: Adult Inpatient Plan of Care  Goal: Optimal Comfort and Wellbeing  Outcome: Progressing     Problem: UTI (Urinary Tract Infection)  Goal: Improved Infection Symptoms  Outcome: Progressing

## 2024-02-12 NOTE — PROGRESS NOTES
Date: 2/12/2024  Time: 1700     Data:  Pre Wt:   Inaccurate bed weight kg  Desired Wt:   To be established  Post Wt:  80.4 kg (standing with shoes and leg brace)  Weight change: - 0.2 kg  Ultrafiltration - Post Run Net Total Removed (mL):  200 ml  Vascular Access Status: CVC patent  Dialyzer Rinse:  Light  Total Blood Volume Processed: 46.1 L   Total Dialysis (Treatment) Time:    3 Hrs  Dialysate Bath: K 3, Ca 3  Heparin: Heparin: None     Lab:   No  HbsAg - Non-reactive (2/10/24)  HbsAb - <3.50 Susceptible (2/10/24)     Interventions:  Dialysis done through [facility] CVC  UF set to 0.3 and increased to 0.5 per Devora Ty CNP Liters of fluid removal, accommodating priming and rinse back volumes   reduced to 300 then 250 d/t high AP and symptoms reported by patient,    No medications administered per MAR  CVC dressing clean, dry and intact. Not changed today.   CritLine showed a stable Profile B throughout the run, tolerating UF pull  Glucose checks done. Pre-HD: 187 mg/dl; Post-HD: 181 mg/dl  Treatment has ended safely and  blood is rinsed back completely  Catheter lumens flushed with saline and locked with heparin, catheter caps (ClearGuard ) changed post HD  Report given to PCN     Assessment:  A/O x 4, calm & cooperative, denies pain  CVC intact, previous dressing clean and dry  30 mins into treatment, patient complained of headache, dizziness, feeling of floating, blurred/double vision in the presents of HTN. 2L O2, 100 mls NS bolus, BFR reduced to 250 mls/min, head of be down. No symptom relief. Devora Ty CNP notified, came to patient room, patient reported symptoms starting to improve. Verbal order for increase in UF to 500 mls, heparin lock of CVC limbs and PRN hydralazine, OK to r/b with continued or worsened symptoms, entered into [facility]. Symptoms improved slightly although not completely resolved. Tx completed, blood returned, order for heparin not entered, unable to override. Limbs locked with  NS. Provider notified.                  Plan:    Per Renal team        Darren King, RN  Acute Dialysis RN  [facility]

## 2024-02-12 NOTE — DISCHARGE INSTRUCTIONS
"Pressure Injury Prevention (PIP) Plan:  If patient is declining pressure injury prevention interventions: Explore reason why and address patient's concerns, Educate on pressure injury risk and prevention intervention(s), If patient is still declining, document \"informed refusal\" , and Ensure Care team is aware ( provider, charge nurse, etc)  Mattress: Follow bed algorithm, reassess daily and order specialty mattress, if indicated.  HOB: Maintain at or below 30 degrees, unless contraindicated  Repositioning in bed: Every 1-2 hours , Left/right positioning; avoid supine, and Raise foot of bed prior to raising head of bed, to reduce patient sliding down (shear)  Heels: Keep elevated off mattress  Protective Dressing: Sacral Mepilex for prevention (#[id number]),  especially for the agitated patient   Positioning Equipment: None  Chair positioning: Chair cushion (#[id number])    If patient has a buttock pressure injury, or high risk for PI use chair cushion or SPS.  Moisture Management: Perineal cleansing /protection: Follow Incontinence Protocol, Avoid brief in bed, Clean and dry skin folds with bathing , and Moisturize dry skin  Under Devices: Inspect skin under all medical devices during skin inspection , Ensure tubes are stabilized without tension, and Ensure patient is not lying on medical devices or equipment when repositioned  Ask provider to discontinue device when no longer needed.    1. You are required to have someone accompany you home. Do not drive or operate machinery today as the medication may cause sleepiness.    2. Rest today and avoid strenuous activity or heavy lifting for 48 hours. Over-activity may produce dizziness and or nausea.    3. You should follow your normal diet. Drink plenty of fluids. No alcoholic beverages for 24 hours. *(Alcohol may interact with the medications you received today)    4. Leave bandage on today, you may remove tomorrow.    5. You may shower tomorrow. Do not soak in a bath tub, " hot tub, or swim until the site is completely healed and the skin glue is off. Keep the site clean and dry.    ADDITIONAL INSTRUCTIONS    When to call your Doctor:     1. Watch your biopsy site for signs of infection, increase pain, redness, swelling, or any drainage and or fever or chills.    2. On-going nausea, vomiting, or un-usual increase in pain.    3. If you experience any of the above or sudden weakness, dizziness, abdominal pain, flank pain or a temperature above 100.0 degree F for more than 24 hours, call your Doctor.    4. Sudden on-set of shortness of breath - call 911 or go to the emergency room.           [facility]  [facility]  Dialysis time is 6:45 am  **1st day arrive by 6:15 am to do paperwork   [facility] [employer] will pick you up at 5:50 am on 2/15/24.     Patient given welcome letter.     Patient needs to prepay transportation from TCU to dialysis.

## 2024-02-12 NOTE — CONSULTS
Images reviewed. Will proceed with imaging-guided kidney biopsy with moderate sedation. Recommend NPO after midnight before the procedure. Stop any anticoagulation if clinically possible.

## 2024-02-13 ENCOUNTER — APPOINTMENT (OUTPATIENT)
Dept: CT IMAGING | Facility: CLINIC | Age: 79
DRG: 682 | End: 2024-02-13
Attending: RADIOLOGY
Payer: COMMERCIAL

## 2024-02-13 LAB
ABO/RH(D): NORMAL
ALBUMIN SERPL ELPH-MCNC: 2.5 G/DL (ref 3.7–5.1)
ALPHA1 GLOB SERPL ELPH-MCNC: 0.3 G/DL (ref 0.2–0.4)
ALPHA2 GLOB SERPL ELPH-MCNC: 0.6 G/DL (ref 0.5–0.9)
ANION GAP SERPL CALCULATED.3IONS-SCNC: 8 MMOL/L (ref 7–15)
ANTIBODY SCREEN: NEGATIVE
B-GLOBULIN SERPL ELPH-MCNC: 0.5 G/DL (ref 0.6–1)
BUN SERPL-MCNC: 21.2 MG/DL (ref 8–23)
CALCIUM SERPL-MCNC: 8.6 MG/DL (ref 8.8–10.2)
CHLORIDE SERPL-SCNC: 98 MMOL/L (ref 98–107)
CREAT SERPL-MCNC: 3.66 MG/DL (ref 0.67–1.17)
CREAT UR-MCNC: 6.9 MG/DL
DEPRECATED HCO3 PLAS-SCNC: 27 MMOL/L (ref 22–29)
EGFRCR SERPLBLD CKD-EPI 2021: 16 ML/MIN/1.73M2
ERYTHROCYTE [DISTWIDTH] IN BLOOD BY AUTOMATED COUNT: 13.2 % (ref 10–15)
GAMMA GLOB SERPL ELPH-MCNC: 0.6 G/DL (ref 0.7–1.6)
GLUCOSE BLDC GLUCOMTR-MCNC: 111 MG/DL (ref 70–99)
GLUCOSE BLDC GLUCOMTR-MCNC: 113 MG/DL (ref 70–99)
GLUCOSE BLDC GLUCOMTR-MCNC: 118 MG/DL (ref 70–99)
GLUCOSE BLDC GLUCOMTR-MCNC: 125 MG/DL (ref 70–99)
GLUCOSE BLDC GLUCOMTR-MCNC: 135 MG/DL (ref 70–99)
GLUCOSE SERPL-MCNC: 122 MG/DL (ref 70–99)
HCT VFR BLD AUTO: 24.3 % (ref 40–53)
HGB BLD-MCNC: 8.1 G/DL (ref 13.3–17.7)
INR PPP: 1.42 (ref 0.85–1.15)
KAPPA LC FREE SER-MCNC: 9.23 MG/DL (ref 0.33–1.94)
KAPPA LC FREE/LAMBDA FREE SER NEPH: 1.91 {RATIO} (ref 0.26–1.65)
LACTATE SERPL-SCNC: 1 MMOL/L (ref 0.7–2)
LAMBDA LC FREE SERPL-MCNC: 4.84 MG/DL (ref 0.57–2.63)
M PROTEIN SERPL ELPH-MCNC: 0.1 G/DL
MCH RBC QN AUTO: 32.4 PG (ref 26.5–33)
MCHC RBC AUTO-ENTMCNC: 33.3 G/DL (ref 31.5–36.5)
MCV RBC AUTO: 97 FL (ref 78–100)
MICROALBUMIN UR-MCNC: 34.1 MG/L
MICROALBUMIN/CREAT UR: 494.2 MG/G CR (ref 0–17)
PLATELET # BLD AUTO: 350 10E3/UL (ref 150–450)
POTASSIUM SERPL-SCNC: 4.4 MMOL/L (ref 3.4–5.3)
PROT PATTERN SERPL ELPH-IMP: ABNORMAL
PROT PATTERN SERPL IFE-IMP: NORMAL
RBC # BLD AUTO: 2.5 10E6/UL (ref 4.4–5.9)
SODIUM SERPL-SCNC: 133 MMOL/L (ref 135–145)
SPECIMEN EXPIRATION DATE: NORMAL
WBC # BLD AUTO: 8.5 10E3/UL (ref 4–11)

## 2024-02-13 PROCEDURE — 250N000013 HC RX MED GY IP 250 OP 250 PS 637

## 2024-02-13 PROCEDURE — 85610 PROTHROMBIN TIME: CPT | Performed by: RADIOLOGY

## 2024-02-13 PROCEDURE — 88348 ELECTRON MICROSCOPY DX: CPT | Performed by: FAMILY MEDICINE

## 2024-02-13 PROCEDURE — 77012 CT SCAN FOR NEEDLE BIOPSY: CPT

## 2024-02-13 PROCEDURE — 36415 COLL VENOUS BLD VENIPUNCTURE: CPT

## 2024-02-13 PROCEDURE — 36415 COLL VENOUS BLD VENIPUNCTURE: CPT | Performed by: RADIOLOGY

## 2024-02-13 PROCEDURE — 88346 IMFLUOR 1ST 1ANTB STAIN PX: CPT | Performed by: FAMILY MEDICINE

## 2024-02-13 PROCEDURE — 99232 SBSQ HOSP IP/OBS MODERATE 35: CPT | Mod: GC

## 2024-02-13 PROCEDURE — 120N000001 HC R&B MED SURG/OB

## 2024-02-13 PROCEDURE — 85027 COMPLETE CBC AUTOMATED: CPT

## 2024-02-13 PROCEDURE — 84999 UNLISTED CHEMISTRY PROCEDURE: CPT | Performed by: FAMILY MEDICINE

## 2024-02-13 PROCEDURE — 88313 SPECIAL STAINS GROUP 2: CPT | Performed by: FAMILY MEDICINE

## 2024-02-13 PROCEDURE — 250N000011 HC RX IP 250 OP 636: Performed by: RADIOLOGY

## 2024-02-13 PROCEDURE — 86900 BLOOD TYPING SEROLOGIC ABO: CPT | Performed by: RADIOLOGY

## 2024-02-13 PROCEDURE — 88350 IMFLUOR EA ADDL 1ANTB STN PX: CPT | Performed by: FAMILY MEDICINE

## 2024-02-13 PROCEDURE — 80048 BASIC METABOLIC PNL TOTAL CA: CPT

## 2024-02-13 PROCEDURE — 99232 SBSQ HOSP IP/OBS MODERATE 35: CPT

## 2024-02-13 PROCEDURE — 88305 TISSUE EXAM BY PATHOLOGIST: CPT | Performed by: FAMILY MEDICINE

## 2024-02-13 PROCEDURE — 84165 PROTEIN E-PHORESIS SERUM: CPT | Mod: 26 | Performed by: PATHOLOGY

## 2024-02-13 PROCEDURE — 272N000004 HC RX 272: Performed by: RADIOLOGY

## 2024-02-13 PROCEDURE — 84166 PROTEIN E-PHORESIS/URINE/CSF: CPT | Performed by: PATHOLOGY

## 2024-02-13 PROCEDURE — 86334 IMMUNOFIX E-PHORESIS SERUM: CPT | Mod: 26 | Performed by: PATHOLOGY

## 2024-02-13 PROCEDURE — 83605 ASSAY OF LACTIC ACID: CPT

## 2024-02-13 PROCEDURE — 0TB13ZX EXCISION OF LEFT KIDNEY, PERCUTANEOUS APPROACH, DIAGNOSTIC: ICD-10-PCS | Performed by: RADIOLOGY

## 2024-02-13 RX ORDER — FLUMAZENIL 0.1 MG/ML
0.2 INJECTION, SOLUTION INTRAVENOUS
Status: DISCONTINUED | OUTPATIENT
Start: 2024-02-13 | End: 2024-02-14 | Stop reason: HOSPADM

## 2024-02-13 RX ORDER — NALOXONE HYDROCHLORIDE 0.4 MG/ML
0.2 INJECTION, SOLUTION INTRAMUSCULAR; INTRAVENOUS; SUBCUTANEOUS
Status: DISCONTINUED | OUTPATIENT
Start: 2024-02-13 | End: 2024-02-14 | Stop reason: HOSPADM

## 2024-02-13 RX ORDER — NALOXONE HYDROCHLORIDE 0.4 MG/ML
0.4 INJECTION, SOLUTION INTRAMUSCULAR; INTRAVENOUS; SUBCUTANEOUS
Status: DISCONTINUED | OUTPATIENT
Start: 2024-02-13 | End: 2024-02-14 | Stop reason: HOSPADM

## 2024-02-13 RX ORDER — FENTANYL CITRATE 50 UG/ML
25-50 INJECTION, SOLUTION INTRAMUSCULAR; INTRAVENOUS EVERY 5 MIN PRN
Status: DISCONTINUED | OUTPATIENT
Start: 2024-02-13 | End: 2024-02-14 | Stop reason: HOSPADM

## 2024-02-13 RX ORDER — HYDRALAZINE HYDROCHLORIDE 20 MG/ML
5 INJECTION INTRAMUSCULAR; INTRAVENOUS
Status: DISCONTINUED | OUTPATIENT
Start: 2024-02-13 | End: 2024-02-14 | Stop reason: HOSPADM

## 2024-02-13 RX ORDER — FERROUS SULFATE 325(65) MG
325 TABLET ORAL EVERY OTHER DAY
Status: DISCONTINUED | OUTPATIENT
Start: 2024-02-13 | End: 2024-02-14 | Stop reason: HOSPADM

## 2024-02-13 RX ORDER — AMLODIPINE BESYLATE 2.5 MG/1
2.5 TABLET ORAL DAILY
Status: DISCONTINUED | OUTPATIENT
Start: 2024-02-13 | End: 2024-02-14 | Stop reason: HOSPADM

## 2024-02-13 RX ORDER — HEPARIN SODIUM 1000 [USP'U]/ML
1.5 INJECTION, SOLUTION INTRAVENOUS; SUBCUTANEOUS SEE ADMIN INSTRUCTIONS
Status: DISCONTINUED | OUTPATIENT
Start: 2024-02-13 | End: 2024-02-14 | Stop reason: HOSPADM

## 2024-02-13 RX ADMIN — FERROUS SULFATE TAB 325 MG (65 MG ELEMENTAL FE) 325 MG: 325 (65 FE) TAB at 11:05

## 2024-02-13 RX ADMIN — FENTANYL CITRATE 50 MCG: 50 INJECTION, SOLUTION INTRAMUSCULAR; INTRAVENOUS at 10:12

## 2024-02-13 RX ADMIN — ACETAMINOPHEN 650 MG: 325 TABLET ORAL at 22:36

## 2024-02-13 RX ADMIN — HYDRALAZINE HYDROCHLORIDE 5 MG: 20 INJECTION, SOLUTION INTRAMUSCULAR; INTRAVENOUS at 09:40

## 2024-02-13 RX ADMIN — MIDAZOLAM HYDROCHLORIDE 0.5 MG: 1 INJECTION, SOLUTION INTRAMUSCULAR; INTRAVENOUS at 10:25

## 2024-02-13 RX ADMIN — SIMVASTATIN 20 MG: 20 TABLET, FILM COATED ORAL at 22:36

## 2024-02-13 RX ADMIN — ACETAMINOPHEN 650 MG: 325 TABLET ORAL at 17:28

## 2024-02-13 RX ADMIN — MIDAZOLAM HYDROCHLORIDE 0.5 MG: 1 INJECTION, SOLUTION INTRAMUSCULAR; INTRAVENOUS at 10:13

## 2024-02-13 RX ADMIN — LEVOFLOXACIN 250 MG: 250 TABLET, FILM COATED ORAL at 22:37

## 2024-02-13 RX ADMIN — Medication 1 EACH: at 10:35

## 2024-02-13 RX ADMIN — FENTANYL CITRATE 25 MCG: 50 INJECTION, SOLUTION INTRAMUSCULAR; INTRAVENOUS at 10:25

## 2024-02-13 ASSESSMENT — ACTIVITIES OF DAILY LIVING (ADL)
ADLS_ACUITY_SCORE: 37

## 2024-02-13 NOTE — PROGRESS NOTES
Patient Name: Pa García  Medical Record Number: [id number]  Today's Date: 2/13/2024    Procedure: CT Guided Renal Biopsy  Proceduralist: Dr Navas  Pathology present: No    Procedure Start: 1007  Procedure end: 1035  Sedation medications administered: Fentanyl 75mcg Versed 1mg     Other medications: Hydralazine IV 5 mg to keep SBP<140    Report given to: Bryant SAMS  : No    Other Notes: Pt arrived to CT room 1 from 234. Consent reviewed. Pt denies any questions or concerns regarding procedure. Pt positioned prone and monitored per protocol. Pt tolerated procedure without any noted complications. Pt transferred back to 234.    Biopsy puncture site to left kidney.    Keturah Gray RN      3 or 4

## 2024-02-13 NOTE — PROGRESS NOTES
Care Management Follow Up    Length of Stay (days): 4    Expected Discharge Date: 02/14/2024     Concerns to be Addressed:       Patient plan of care discussed at interdisciplinary rounds: Yes    Anticipated Discharge Disposition: Transitional Care vs home with home care     Anticipated Discharge Services:    Anticipated Discharge DME:      Patient/family educated on Medicare website which has current facility and service quality ratings: yes  Education Provided on the Discharge Plan: Yes  Patient/Family in Agreement with the Plan: yes    Referrals Placed by CM/SW: Post Acute Facilities  Private pay costs discussed: Not applicable    Additional Information:  Met with pt to discuss discharge plans.  Referral made for outpatient dialysis by RNDAVID.  Pt was at [facility] TCU prior to hospital admission.  Pt undecided at this time if he wants to return to TCU at discharge or would want to return home with home care.  Pt wanting to think over options and asking Care Manager to meet with him on 2/14.     ANICETO Pavon

## 2024-02-13 NOTE — PROGRESS NOTES
[facility]    Progress Note - Hospitalist Service       Date of Admission:  2/9/2024    Assessment & Plan   Pa García is a 78 year old male admitted on 2/9/2024. He has a history of HTN, HLD, DM, atrial flutter not anticoagulated, stage III chronic kidney disease, partial paraplegia with neurogenic bowel and bladder as a complication of previous back surgery with right foot drop who was recently admitted to [facility] 2/1/24- 2/4/24 for mechanical fall and generalized weakness. He is admitted now for acute renal failure with hyperkalemia and high anion gap metabolic acidosis. S/p HD on 2/9/24, 2/10/24, 2/12/24. S/p renal biopsy, pathology read is pending. Discharge is pending HD establishment.      Severe Acute Kidney Injury   CKD Stage 3   Patient's baseline Cr 1.4-1.76 during recent hospitalization, Cr on admission 5.15. Improved to 3.4 s/p HD x2. Nephrology team following, most likely etiology metformin associated lactic acidosis (ALIYA). Patient has been taking metformin 1000 mg BID prior to admission, not renally dosed.  -Nephrology consulted              -MWF HD while here, Care Coordinated to start [facility] admission process for OP Dialysis coordination.   -S/P Renal Bs 2/13/24, for definitive ROXIE/CKD Dx  -Start PO Iron supplement QOD  -BMP daily.   -Monitor daily BMP and urinary output  -Avoid nephrotoxins, renally dose medications   -Strict I&O's, daily weights      Hyperkalemia, resolved  K+ of 6.3 on admission with peaked T waves and QRS widening on EKG. S/p calcium gluconate, insulin, dextrose, and sodium bicarb. K now normalized at 4.4.  -HD with K2 bath   -Renal diet      High anion gap metabolic acidosis, resolved    Lactic Acidosis, improving    Leukocytosis, resolved  ? Community Acquired Pneumonia, Right Middle Lobe  Urinary Tract infection, E. Coli   Lactic acid of 6.4 on arrival, down to 5.4 on recheck. ABG with pH 7.03, CO2 37, bicarb 10. WBC elevated to 23 on arrival, down  "to 9.6 today. Receiving IV antibiotics. Concerning for metformin associated lactic acidosis (ALIYA). Patient has been taking metformin 1000 mg BID prior to admission, not renally dosed. Non-contract chest CT showing \"Clustered nodularity in the right middle lobe which is favored infectious/inflammatory.\" UA suspicious for infection, culture growing >10k CFU's E. Coli, pan sensitive to antibiotics.   -Nephrology consulted, recommendations as above  -Transitioned to po antibiotics with levaquin 250 mg Q48 hours due to HD, next due 2/13/24, 2 doses remaining  -Blood cultures pending, NGTD   -Follow up chest CT in 3 months to ensure resolution      Hyponatremia   Na 127 on admission. Improved to 132 with dialysis and remains stable.   -Daily BMP      HTN   Normotensive today, asymptomatic.   -Restart amlodipine, continue holding PTA antihypertensives including Diovan and hydrochlorothiazide   -Hydralazine 25 mg Q8H for SBP >160     Macrocytic Anemia   Hgb 10.4 on admission likely related to hemoconcentration, 8.1 today. No signs of active bleed. Iron studies notable for ferritin 113, TIBC 158, iron 50.  -Daily CBC     Atrial Flutter   New as of recent hospitalization. Patient had an echo 2/2/24 with normal LV function without wall motion abnormalities. He would be a candidate for full anticoagulation but patient declined given history of recurrent falls. Plan to follow up with outpatient EP cardiology.  -Discontinued cardiac tele on 2/12/24 given stability      Type 2 DM   Most recent A1c of 6.7%. Blood glucose on admission 83.   -Hold PTA metformin indefinitely   -Medium sliding scale insulin   -Monitor with daily BMP      Chronic Problems:  -HLD: continue PTA statin  -Diabetic neuropathy: HOLD PTA gabapentin         Diet: NPO per Anesthesia Guidelines for Procedure/Surgery Except for: Meds    DVT Prophylaxis: Pneumatic Compression Devices  Gilbert Catheter: Not present  Fluids: bicarb drip at 150ml/hr   Lines: None   "   Cardiac Monitoring: None  Code Status: Full Code      Clinically Significant Risk Factors              # Hypoalbuminemia: Lowest albumin = 2.8 g/dL at 2/11/2024  4:47 AM, will monitor as appropriate  # Coagulation Defect: INR = 1.42 (Ref range: 0.85 - 1.15) and/or PTT = N/A, will monitor for bleeding    # Hypertension: Noted on problem list       # DMII: A1C = 6.7 % (Ref range: <5.7 %) within past 6 months         # Financial/Environmental Concerns: none         Disposition Plan     Expected Discharge Date: 02/14/2024      Destination: home  Discharge Comments: HD, WOC 2/12.   TCU referrals pending        The patient's care was discussed with the attending physician, Dr. Kuhn.    SELIN BRAVO MD PGY1  Hospitalist Service  [facility]  Securely message with viavoo (more info)  Text page via [employer] Paging/Directory   ______________________________________________________________________    Interval History   No acute overnight events. He was getting ready for his renal biopsy when this writer went to see him, but he affirmed doing well and denied any new questions or concerns.      Physical Exam   Vital Signs: Temp: 97.4  F (36.3  C) Temp src: Oral BP: 133/68 Pulse: 88   Resp: 18 SpO2: 97 % O2 Device: None (Room air) Oxygen Delivery: 2 LPM  Weight: 165 lbs 9.05 oz  Constitutional: Awake, alert, cooperative, no apparent distress, and appears stated age, sitting on side of bed ready to move to walker.  HEENT: Normocephalic, atraumatic, no rhinorrhea, moist mucus membranes.  Respiratory: No increased work of breathing, good air exchange.  Cardiovascular: Irregular heart rhythm, skin is warm and well-perfused, no significant lower extremity edema.  Skin: Normal skin color, texture, turgor, no suspicious rash and lesion on exposed skin.  Neurologic: Alert, oriented, interactive, responsive. No apparent focal deficits.  Psychiatric: Pleasant mood and affect.      Data    ------------------------- PAST 24 HR DATA REVIEWED -----------------------------------------------    I have personally reviewed the following data over the past 24 hrs:    8.5  \   8.1 (L)   / 350     133 (L) 98 21.2 /  111 (H)   4.4 27 3.66 (H) \     Procal: N/A CRP: N/A Lactic Acid: 1.0       INR:  1.42 (H) PTT:  N/A   D-dimer:  N/A Fibrinogen:  N/A       Imaging results reviewed over the past 24 hrs:   Recent Results (from the past 24 hour(s))   CT Renal Biopsy Percutaneous    Narrative    EXAM:  1. LEFT RENAL BIOPSY PERCUTANEOUS  2. CT-GUIDANCE  3. CONSCIOUS SEDATION  LOCATION: [facility]  DATE: 2/13/2024    INDICATION: CKD with superimposed acute kidney disease.  TECHNIQUE: Dose reduction techniques were used.    PROCEDURE: Informed consent obtained. Time out performed. The patient was placed into prone position. Left flank was prepped and draped in sterile fashion. In addition to moderate sedation, 10 mL of 1% Xylocaine was infused into the local soft tissues.   Under CT guidance, a 17 gauge guiding needle was placed into the renal cortex. Coaxially, an 18 gauge needle was used to make 3 core biopsies. Tissue submitted in routine renal biopsy tubes.    No complications.    D-Stat flowable hemostatic agent injected through the guiding needle.    SEDATION: Versed 1 mg. Fentanyl 75 mcg. The procedure was performed with administration intravenous conscious sedation with appropriate preoperative, intraoperative, and postoperative evaluation.    25 minutes of supervised face to face conscious sedation time was provided by a radiology nurse under my direct supervision.      Impression    IMPRESSION:  1.  Successful CT-guided renal biopsy.      Reference CPT Code: 70131, 76388, 47493, 28622

## 2024-02-13 NOTE — PLAN OF CARE
Goal Outcome Evaluation:    Pt alert and oriented. VSS. Pt complained of 3/10 back pain, relieved with PRN tylenol. Pt has been calm and cooperative, slept throughout the night. Pt calls appropriately, call light within reach. Bed alarms on.    Problem: Adult Inpatient Plan of Care  Goal: Optimal Comfort and Wellbeing  Outcome: Progressing     Problem: Risk for Delirium  Goal: Improved Sleep  Outcome: Progressing

## 2024-02-13 NOTE — PLAN OF CARE
Problem: Adult Inpatient Plan of Care  Goal: Plan of Care Review  Description: The Plan of Care Review/Shift note should be completed every shift.  The Outcome Evaluation is a brief statement about your assessment that the patient is improving, declining, or no change.  This information will be displayed automatically on your shift  note.  Outcome: Progressing     Problem: UTI (Urinary Tract Infection)  Goal: Improved Infection Symptoms  Outcome: Progressing   Goal Outcome Evaluation:         Pt alert and oriented x4.   Pt had Kidney Biopsy done this morning. Bandaid in place.   Pt afebrile - continue with po abx.   Pt getting HD on MWF and was told today he will need that outpt for the next 3 months and is very sad and depressed about that.   Mepelix on butt - blanchable redness noted. Pt turned and repositioning on his own.   Anticipate discharge to TCU once HD gets set up.

## 2024-02-13 NOTE — PROGRESS NOTES
"POST-OP CHECK    Pa García was seen post-operatively after right kidney biopsy. Doing fine overall. Endorses no pain as it is controlled on current pain medication regimen. Denies any shortness of breath, chest pain/discomfort, nausea, vomiting. All questions were answered.     /68 (BP Location: Left arm)   Pulse 88   Temp 97.4  F (36.3  C) (Oral)   Resp 18   Ht 1.753 m (5' 9\")   Wt 75.1 kg (165 lb 9.1 oz)   SpO2 97%   BMI 24.45 kg/m    GENERAL: Awake, alert, sitting up in bed. No acute distress.   HEENT: No scleral icterus or conjunctival injection. No cervical or supraclavicular lymphadenopathy.  SKIN: Warm and dry. No rash or lesions.   LUNGS: Normal work of breathing with no use of accessory muscles. Clear breath sounds in all lung fields bilaterally with no wheezes or crackles appreciated.  CARDIAC: Irregular heart rhythm. No murmurs, clicks, or rubs appreciated. No peripheral edema.  ABDOMEN: Non-distended. Soft and non-tender throughout with no masses.  NEUROLOGIC: Alert and oriented. CN II-XII intact bilaterally.   EXTREMITIES: No gross deformity or peripheral edema. Appear well-perfused.       Assessment/plan: continue on current plan as detailed in today's formal progress note.    Patient dicussed with attending physician, Dr. Kitty Garcia , who agrees with the plan.       Joann Leigh DO, PGY-2  [facility]  02/13/24     "

## 2024-02-13 NOTE — PROGRESS NOTES
Pt A/Ox4, denies pain, pleasant and cooperative to cares, ambulated to the restroom A1, gait belt and walker, UA collected, blood sugar checked, insulin given per sliding scale, pt to be NPO at midnight for possible renal biopsy, pt understand regime, report given to RN assuming cares.

## 2024-02-13 NOTE — PROGRESS NOTES
"    RENAL PROGRESS NOTE    CC:ROXIE    ASSESSMENT & PLAN:     PLAN:   -MWF HD while here, Care Coordinated to start [facility] admission process for OP Dialysis coordination.   -S/P Renal Bs 2/13/24, for definitive ROXIE/CKD Dx  -Start PO Iron supplement QOD  -BMP daily.     ARF/CKD: has underlying CKD with CR 1.7 in July and CR running between 1.4 and 1.8 during  recent. hospitalization--noted to be rising on 2/6 and further to 5.2 2/11, then started on dialysis.  Etiology of both ARF/subacute/ CKD is unclear (maybe some dehydration at different points but seems out of proportion to degree and acuity of ARF). Serologies pending. d/w ptrisk and benefits of a biopsy, would favor Bx for diagnosis and prognosis. Discussed risk/benefit again for renal biopsy, pt would like to proceed with BX, entered orders/Not on AC/BP stable.       DM2: on insulin. metforin on hold.    HLD: on statin    Anemia:  following hgb.    A flutter: on ASA PTA    Metabolic Acidosis: Resolved with bicarb gtt and HD.  thought related to metformin (elevated lactate) and ARF. Now off metformin    Hyperkalemia: resolved with HD    Hyponatremia: follow with HD    Pseudo-hypocalcemia: Ca+ corrected for hypoalbuminemia WNL    HTN: on norvasc; bp ok; avoid HoTN    Volume status: seems ok, UF with HD; ?was dehydrated at some point prior to admiit       SUBJECTIVE:      He is sitting up in bed, appears comfortable  Pt complete full dialysis run yesterday, had an episode where he felt \"like he was floating\", but improved with decreasing UF, VVS with Tx, some high pressures with therapy.  Dialysis  MWF while here, SW consulted for OP dialysis coordination  Pt would like to discharge back to [facility]  Denies n, v, c, d, sob, fever, rash or CP  +LLE ongoing  We discussed in depth level of ROXIE/CKD, etiology unclear, Risk/Benefit of renal Bx, Bx favorable for Dx and prognosis. PT would like to proceed with renal Bx, s/p Bx today, went well without complications. "   HD tomorrow  Discussed labs/plan and answered all questions.       OBJECTIVE:  Physical Exam   Temp: 98.4  F (36.9  C) Temp src: Oral BP: (!) 142/70 Pulse: 70   Resp: 18 SpO2: 94 % O2 Device: None (Room air) Oxygen Delivery: 2 LPM  Vitals:    02/10/24 0600 02/12/24 0413 02/13/24 0533   Weight: 69.4 kg (153 lb) 80.8 kg (178 lb 2.1 oz) 75.1 kg (165 lb 9.1 oz)     Vital Signs with Ranges  Temp:  [97.5  F (36.4  C)-98.4  F (36.9  C)] 98.4  F (36.9  C)  Pulse:  [63-83] 70  Resp:  [18-19] 18  BP: (138-171)/(66-83) 142/70  SpO2:  [90 %-100 %] 94 %  I/O last 3 completed shifts:  In: 480 [P.O.:480]  Out: 800 [Urine:600; Other:200]    Patient Vitals for the past 72 hrs:   Weight   02/13/24 0533 75.1 kg (165 lb 9.1 oz)   02/12/24 0413 80.8 kg (178 lb 2.1 oz)     Intake/Output Summary (Last 24 hours) at 2/12/2024 0844  Last data filed at 2/12/2024 0417  Gross per 24 hour   Intake 120 ml   Output 500 ml   Net -380 ml       PHYSICAL EXAM:  GEN: NAD, aox3  CV: RRR   Lung: clear and equal  Ab: soft and NT  Ext: +1-2 LLE BL and well perfused  Skin: no rash  Psych: normal affect  Neuro: No asterixis, grossly intact.       LABORATORY STUDIES:     Recent Labs   Lab 02/13/24  0619 02/12/24  0603 02/11/24  0447 02/10/24  0552 02/09/24  0941   WBC 8.5 9.6 11.9* 12.7* 23.1*   RBC 2.50* 2.51* 2.63* 2.37* 3.19*   HGB 8.1* 8.1* 8.5* 7.7* 10.4*   HCT 24.3* 23.8* 24.8* 22.3* 32.3*    362 370 320 412       Basic Metabolic Panel:  Recent Labs   Lab 02/13/24  0812 02/13/24  0619 02/13/24  0205 02/12/24  2156 02/12/24  1804 02/12/24  1633 02/12/24  0831 02/12/24  0603 02/11/24  0758 02/11/24  0447 02/10/24  0825 02/10/24  0552 02/09/24  1443 02/09/24  1422 02/09/24  1342 02/09/24  1312 02/09/24  1248 02/09/24  0941 02/08/24  0605   NA  --  133*  --   --   --   --   --  132*  --  133*  --  132*  --   --   --   --   --  127* 131*   POTASSIUM  --  4.4  --   --   --   --   --  4.4  --  4.3  --  4.5  --  5.1  --  5.2  --  6.3* 5.7*   CHLORIDE   --  98  --   --   --   --   --  94*  --  94*  --  93*  --   --   --   --   --  92* 99   CO2  --  27  --   --   --   --   --  26  --  27  --  23  --   --   --   --   --  10* 16*   BUN  --  21.2  --   --   --   --   --  34.6*  --  29.7*  --  47.5*  --   --   --   --   --  77.8* 70.3*   CR  --  3.66*  --   --   --   --   --  4.42*  --  3.42*  --  4.05*  --   --   --   --   --  5.15* 4.08*   * 122* 125* 144* 147* 181*   < > 115*   < > 117*   < > 171*   < >  --    < >  --    < > 83 100*   JAYRO  --  8.6*  --   --   --   --   --  8.3*  --  8.0*  --  8.1*  --   --   --   --   --  8.6* 8.4*    < > = values in this interval not displayed.       INR  Recent Labs   Lab 02/13/24  0753   INR 1.42*        Recent Labs   Lab Test 02/13/24  0753 02/13/24  0619 02/12/24  0603   INR 1.42*  --   --    WBC  --  8.5 9.6   HGB  --  8.1* 8.1*   PLT  --  350 362       Personally reviewed current labs    Devora [facility]  [facility]  [phone]

## 2024-02-14 ENCOUNTER — MEDICAL CORRESPONDENCE (OUTPATIENT)
Dept: HEALTH INFORMATION MANAGEMENT | Facility: CLINIC | Age: 79
End: 2024-02-14

## 2024-02-14 ENCOUNTER — DOCUMENTATION ONLY (OUTPATIENT)
Dept: GERIATRICS | Facility: CLINIC | Age: 79
End: 2024-02-14
Payer: COMMERCIAL

## 2024-02-14 VITALS
RESPIRATION RATE: 14 BRPM | OXYGEN SATURATION: 97 % | HEIGHT: 69 IN | WEIGHT: 175.49 LBS | DIASTOLIC BLOOD PRESSURE: 75 MMHG | BODY MASS INDEX: 25.99 KG/M2 | HEART RATE: 73 BPM | TEMPERATURE: 98.3 F | SYSTOLIC BLOOD PRESSURE: 128 MMHG

## 2024-02-14 LAB
ALBUMIN MFR UR ELPH: 24.4 %
ALBUMIN SERPL BCG-MCNC: 2.9 G/DL (ref 3.5–5.2)
ALPHA1 GLOB MFR UR ELPH: 16.6 %
ALPHA2 GLOB MFR UR ELPH: 14.2 %
ANION GAP SERPL CALCULATED.3IONS-SCNC: 13 MMOL/L (ref 7–15)
B-GLOBULIN MFR UR ELPH: 25.4 %
BACTERIA BLD CULT: NO GROWTH
BACTERIA BLD CULT: NO GROWTH
BUN SERPL-MCNC: 26 MG/DL (ref 8–23)
CALCIUM SERPL-MCNC: 8.6 MG/DL (ref 8.8–10.2)
CHLORIDE SERPL-SCNC: 97 MMOL/L (ref 98–107)
CREAT SERPL-MCNC: 4.54 MG/DL (ref 0.67–1.17)
DEPRECATED HCO3 PLAS-SCNC: 24 MMOL/L (ref 22–29)
EGFRCR SERPLBLD CKD-EPI 2021: 13 ML/MIN/1.73M2
ERYTHROCYTE [DISTWIDTH] IN BLOOD BY AUTOMATED COUNT: 13.3 % (ref 10–15)
GAMMA GLOB MFR UR ELPH: 19.4 %
GLUCOSE BLDC GLUCOMTR-MCNC: 107 MG/DL (ref 70–99)
GLUCOSE BLDC GLUCOMTR-MCNC: 122 MG/DL (ref 70–99)
GLUCOSE BLDC GLUCOMTR-MCNC: 131 MG/DL (ref 70–99)
GLUCOSE BLDC GLUCOMTR-MCNC: 136 MG/DL (ref 70–99)
GLUCOSE SERPL-MCNC: 115 MG/DL (ref 70–99)
HBV CORE AB SERPL QL IA: NONREACTIVE
HCT VFR BLD AUTO: 25.2 % (ref 40–53)
HGB BLD-MCNC: 8.5 G/DL (ref 13.3–17.7)
M PROTEIN MFR UR ELPH: 0 %
MCH RBC QN AUTO: 32.2 PG (ref 26.5–33)
MCHC RBC AUTO-ENTMCNC: 33.7 G/DL (ref 31.5–36.5)
MCV RBC AUTO: 96 FL (ref 78–100)
PLATELET # BLD AUTO: 359 10E3/UL (ref 150–450)
POTASSIUM SERPL-SCNC: 4 MMOL/L (ref 3.4–5.3)
PROT PATTERN UR ELPH-IMP: ABNORMAL
RBC # BLD AUTO: 2.64 10E6/UL (ref 4.4–5.9)
SODIUM SERPL-SCNC: 134 MMOL/L (ref 135–145)
WBC # BLD AUTO: 9.7 10E3/UL (ref 4–11)

## 2024-02-14 PROCEDURE — 36415 COLL VENOUS BLD VENIPUNCTURE: CPT

## 2024-02-14 PROCEDURE — 90935 HEMODIALYSIS ONE EVALUATION: CPT

## 2024-02-14 PROCEDURE — 85027 COMPLETE CBC AUTOMATED: CPT

## 2024-02-14 PROCEDURE — 99238 HOSP IP/OBS DSCHRG MGMT 30/<: CPT | Mod: GC

## 2024-02-14 PROCEDURE — 84166 PROTEIN E-PHORESIS/URINE/CSF: CPT | Mod: 26 | Performed by: PATHOLOGY

## 2024-02-14 PROCEDURE — 82040 ASSAY OF SERUM ALBUMIN: CPT

## 2024-02-14 PROCEDURE — 250N000011 HC RX IP 250 OP 636

## 2024-02-14 PROCEDURE — 80048 BASIC METABOLIC PNL TOTAL CA: CPT

## 2024-02-14 RX ORDER — LEVOFLOXACIN 250 MG/1
250 TABLET, FILM COATED ORAL
Qty: 1 TABLET | Refills: 0 | Status: SHIPPED | OUTPATIENT
Start: 2024-02-15 | End: 2024-02-19

## 2024-02-14 RX ORDER — LEVOFLOXACIN 250 MG/1
250 TABLET, FILM COATED ORAL
Qty: 1 TABLET | Refills: 0 | Status: CANCELLED | OUTPATIENT
Start: 2024-02-15

## 2024-02-14 RX ORDER — FERROUS SULFATE 325(65) MG
325 TABLET ORAL EVERY OTHER DAY
Qty: 30 TABLET | Refills: 0 | Status: SHIPPED | OUTPATIENT
Start: 2024-02-15

## 2024-02-14 RX ORDER — AMLODIPINE BESYLATE 2.5 MG/1
5 TABLET ORAL DAILY
Qty: 60 TABLET | Refills: 0 | Status: SHIPPED | OUTPATIENT
Start: 2024-02-14

## 2024-02-14 RX ADMIN — HEPARIN SODIUM 1500 UNITS: 1000 INJECTION INTRAVENOUS; SUBCUTANEOUS at 11:45

## 2024-02-14 RX ADMIN — HEPARIN SODIUM 1500 UNITS: 1000 INJECTION INTRAVENOUS; SUBCUTANEOUS at 11:44

## 2024-02-14 ASSESSMENT — ACTIVITIES OF DAILY LIVING (ADL)
ADLS_ACUITY_SCORE: 37

## 2024-02-14 NOTE — DISCHARGE SUMMARY
"[facility]  Discharge Summary - Medicine & Pediatrics       Date of Admission:  2/9/2024  Date of Discharge:  2/14/2024  Discharging Provider: Dr. Kitty Garcia  Discharge Service: Hospitalist Service    Discharge Diagnoses   Patient Active Problem List   Diagnosis    Lumbar stenosis    Dural tear    Uncontrolled diabetes mellitus    Accelerated hypertension    Right foot drop    ROXIE (acute kidney injury) (H24)    Rhabdomyolysis    Dehydration    Generalized weakness    Elevated troponin    Atypical atrial flutter (H)    Mechanical fall    HLD (hyperlipidemia)    Cauda equina spinal cord injury (H)    Chronic kidney disease (CKD), stage I    Degenerative spondylolisthesis    Herniated nucleus pulposus of lumbosacral region    Hypertension    Neurogenic bladder    Neurogenic bowel    Non-recurrent bilateral inguinal hernia without obstruction or gangrene    Paraparesis (H)    Hyperkalemia    Acute renal insufficiency       Clinically Significant Risk Factors     # DMII: A1C = 6.7 % (Ref range: <5.7 %) within past 6 months  # Overweight: Estimated body mass index is 25.91 kg/m  as calculated from the following:    Height as of this encounter: 1.753 m (5' 9\").    Weight as of this encounter: 79.6 kg (175 lb 7.8 oz).       Follow-ups Needed After Discharge     Follow up with [facility] physician.  The following labs/tests are   recommended: BMP, CBC.    Follow up with Nephrology, planning for Monday/Wednesday/Friday dialysis.      Repeat chest CT in 3 months to ensure resolution of nodules.       Unresulted Labs Ordered in the Past 30 Days of this Admission       Date and Time Order Name Status Description    2/14/2024  8:27 AM Hepatitis B core antibody In process     2/13/2024 12:19 PM Protein electrophoresis random urine In process     2/13/2024  8:26 AM Renal Biopsy (Reference Laboratory) In process     2/9/2024 10:00 AM Blood Culture Peripheral Blood Preliminary     2/9/2024 10:00 AM " Blood Culture Peripheral Blood Preliminary         These results will be followed up by nursing home physician.    Discharge Disposition   Discharged to short-term care facility  Condition at discharge: Stable    Hospital Course   Pa García was admitted on 2/9/2024 for acute kidney injury in the setting of suspected metformin associated lactic acidosis. He has a history of HTN, HLD, DM, atrial flutter not anticoagulated, stage III chronic kidney disease, partial paraplegia with neurogenic bowel and bladder as a complication of previous back surgery with right foot drop and was recently admitted to  2/1/24- 2/4/24 for mechanical fall and generalized weakness. He was admitted again on 2/8/24 for acute renal failure with hyperkalemia and high anion gap metabolic acidosis. S/p HD on 2/9/24, 2/10/24, 2/12/24, 2/14/24. S/p renal biopsy, pathology read is pending. Plan is for HD following a TTS schedule for 3 months. Patient will discharge initially to TCU for reconditioning. He has no other questions or concerns at time of discharge. Metformin has been held and he has been on sliding scale insulin while hospitalized, though blood sugars have been well-controlled. Will defer to outpatient provider to further consider and discuss diabetes management long-term.    The following problems were addressed during his hospitalization:    Severe Acute Kidney Injury   CKD Stage 3   High anion gap metabolic acidosis, resolved    Lactic Acidosis, improving    Patient's baseline Cr 1.4-1.76 during recent hospitalization, Cr on admission 5.15. Improved to 3.4 s/p HD x2. Nephrology team following, most likely etiology metformin associated lactic acidosis (ALIYA). Patient has been taking metformin 1000 mg BID prior to admission, not renally dosed. Patient ultimately underwent HD 2/9/24, 2/10/24, 2/12/24, 2/14/24 which he tolerated well.  -Nephrology consulted              -Care Coordinated confirmed  [facility] admission went  "through and pt has a  TTS 1st shift spot, I called Out pt HD orders to [facility]. Renal okay with discharge when medically cleared.   -S/P Renal Bx 2/13/24, for definitive ROXIE/CKD Dx.  -Start PO Iron supplement every other day.  -BMP daily.      Hyperkalemia, resolved  K+ of 6.3 on admission with peaked T waves and QRS widening on EKG. S/p calcium gluconate, insulin, dextrose, and sodium bicarb. K now normalized, 4.0 today.     High anion gap metabolic acidosis, resolved    Lactic Acidosis, improving    Leukocytosis, resolved  ? Community Acquired Pneumonia, Right Middle Lobe  Urinary Tract infection, E. Coli   Lactic acid of 6.4 on arrival, down to 5.4 on recheck. ABG with pH 7.03, CO2 37, bicarb 10. WBC elevated to 23 on arrival, down to 9.7 today. Received IV antibiotics initially, transitioned to PO Levaquin. Non-contrast chest CT showing \"Clustered nodularity in the right middle lobe which is favored infectious/inflammatory.\" UA suspicious for infection, culture growing >10k CFU's E. Coli, pan sensitive to antibiotics.   -Transitioned to po antibiotics with levaquin 250 mg Q48 hours due to HD, next due 2/15/24 which will be the last dose  -Blood cultures pending, NGTD   -Follow up chest CT in 3 months to ensure resolution      Hyponatremia   Na 127 on admission. Improved to 132 with dialysis and remains stable.      HTN   -Restarted amlodipine, will increase to 5 mg on discharge given other antihypertensives will continue to be held  -Continue holding PTA losartan and hydrochlorothiazide      Macrocytic Anemia   Hgb 10.4 on admission likely related to hemoconcentration, 8.5 today. No signs of active bleed. Iron studies notable for ferritin 113, TIBC 158, iron 50.     Atrial Flutter   New as of recent hospitalization. Patient had an echo 2/2/24 with normal LV function without wall motion abnormalities. He would be a candidate for full anticoagulation but patient declined given history of recurrent " falls. Plan to follow up with outpatient EP cardiology.     Type 2 DM   Most recent A1c of 6.7%. Blood glucose on admission 83.   -Hold PTA metformin indefinitely   -Medium sliding scale insulin while hospitalized     Chronic Problems:  -HLD: continue PTA statin  -Diabetic neuropathy: HOLD PTA gabapentin     Consultations This Hospital Stay   PHARMACY TO DOSE VANCO  NEPHROLOGY IP CONSULT  PHARMACY TO DOSE VANCO  WOUND OSTOMY CONTINENCE NURSE  IP CONSULT  PHYSICAL THERAPY ADULT IP CONSULT  OCCUPATIONAL THERAPY ADULT IP CONSULT  CARE MANAGEMENT / SOCIAL WORK IP CONSULT  INTERVENTIONAL RADIOLOGY ADULT/PEDS IP CONSULT  CARE MANAGEMENT / SOCIAL WORK IP CONSULT  PHYSICAL THERAPY ADULT IP CONSULT  OCCUPATIONAL THERAPY ADULT IP CONSULT    Code Status   Full Code       The patient was discussed with Dr. Garcia.    SELIN BRAVO MD  [address] [address]  Phone: [phone]  Fax: [phone]  ______________________________________________________________________    Physical Exam   Vital Signs: Temp: 98.3  F (36.8  C) Temp src: Oral BP: (!) 183/64 Pulse: 84   Resp: 14 SpO2: 97 % O2 Device: None (Room air)    Weight: 175 lbs 7.78 oz  Constitutional: Awake, alert, cooperative, no apparent distress, and appears stated age, laying in bed for dialysis.  HEENT: Normocephalic, atraumatic, no rhinorrhea, moist mucus membranes.  Respiratory: No increased work of breathing, good air exchange.  Cardiovascular: Irregular heart rhythm, skin is warm and well-perfused, no significant lower extremity edema.  Skin: Normal skin color, texture, turgor, no suspicious rash and lesion on exposed skin.  Neurologic: Alert, oriented, interactive, responsive. No apparent focal deficits.  Psychiatric: Pleasant mood and affect.      Primary Care Physician   Lars Rajan    Discharge Orders      General info for SNF    Length of Stay Estimate: Short Term Care: Estimated  # of Days <30  Condition at Discharge: Stable  Level of care: Skilled   Rehabilitation Potential: Good  Admission H&P remains valid and up-to-date: Yes  Recent Chemotherapy: N/A  Use Nursing Home Standing Orders: Yes     Mantoux instructions    Give two-step Mantoux (PPD) Per Facility Policy Yes     Follow Up and recommended labs and tests    Follow up with long term physician.  The following labs/tests are recommended: BMP, CBC.  Follow up with Nephrology, planning for Monday/Wednesday/Friday dialysis.     Reason for your hospital stay    Acute kidney injury, metformin associated lactic acidosis     Glucose monitor nursing POCT    Before meals and at bedtime     Intake and output    Every shift     Daily weights    Call Provider for weight gain of more than 2 pounds per day or 5 pounds per week.     Activity - Up with assistive device     Physical Therapy Adult Consult    Evaluate and treat as clinically indicated.    Reason:  deconditioning     Occupational Therapy Adult Consult    Evaluate and treat as clinically indicated.    Reason:  deconditioning     Fall precautions     Diet    Follow this diet upon discharge: Orders Placed This Encounter      Renal Diet (dialysis)       Significant Results and Procedures   Results for orders placed or performed during the hospital encounter of 02/09/24   CT Abdomen Pelvis w/o Contrast    Narrative    EXAM: CT ABDOMEN PELVIS W/O CONTRAST  LOCATION: [facility]  DATE: 2/9/2024    INDICATION: eval etiology poss obstructive uropathy  COMPARISON: CT pelvis 06/08/2021, chest radiograph 02/09/2024.  TECHNIQUE: CT scan of the abdomen and pelvis was performed without IV contrast. Multiplanar reformats were obtained. Dose reduction techniques were used.  CONTRAST: None.    FINDINGS:   LOWER CHEST: Clustered nodularity in the medial right middle lobe, favored infectious/inflammatory. Trace bilateral pleural thickening. Hypoattenuation of intracardiac blood  pool suggesting anemia.    HEPATOBILIARY: Fatty infiltration adjacent to the falciform ligament. Gallbladder distention. No calcified gallstones or biliary dilatation.    PANCREAS: Atrophy of the pancreatic parenchyma with numerous parenchymal calcifications suggesting sequela of chronic pancreatitis.    SPLEEN: Normal.    ADRENAL GLANDS: Normal.    KIDNEYS/BLADDER: No renal or ureteral calculi. No hydronephrosis. Redemonstrated layering calcifications in the urinary bladder, now adjacent to the left UVJ and measuring up to 1.1 x 0.5 cm. The urinary bladder is decompressed with a small amount of air   related to the internal Gilbert catheter.    BOWEL: No obstruction or inflammatory change.    LYMPH NODES: Normal.    VASCULATURE: Pelvic phleboliths. Moderate scattered vascular calcifications.    PELVIC ORGANS: Prostatomegaly. Small volume abdominopelvic ascites.    MUSCULOSKELETAL: Multilevel degenerative changes in the lumbar spine with interbody device at L2-L3, anterior instrumented fusion L4-L5 with interbody device, bilateral L4-L5 arthrodesis screws, and multilevel laminectomy.      Impression    IMPRESSION:   1.  Redemonstrated bladder calculi. No renal or ureteral calculi. No hydronephrosis.  2.  Clustered nodularity in the right middle lobe which is favored infectious/inflammatory. Recommend follow-up CT chest in 3 months to ensure resolution.  3.  Sequelae of chronic pancreatitis.  4.  Small volume abdominopelvic ascites.  5.  Extensive postoperative changes in the lumbar spine.  6.       XR Chest Port 1 View    Narrative    EXAM: XR CHEST PORT 1 VIEW  LOCATION: [facility]  DATE: 2/9/2024    INDICATION: post RIJ CVC  COMPARISON: Chest radiograph 12/01/2024.      Impression    IMPRESSION:     Right internal jugular approach central venous catheter tip is in the mid SVC approximately 6.5 cm above the superior cavoatrial junction.    Visualized portions of the lungs are clear. No  visible pneumothorax or pleural fluid.    Stable cardiomediastinal silhouette.   CT Renal Biopsy Percutaneous    Narrative    EXAM:  1. LEFT RENAL BIOPSY PERCUTANEOUS  2. CT-GUIDANCE  3. CONSCIOUS SEDATION  LOCATION: [facility]  DATE: 2/13/2024    INDICATION: CKD with superimposed acute kidney disease.  TECHNIQUE: Dose reduction techniques were used.    PROCEDURE: Informed consent obtained. Time out performed. The patient was placed into prone position. Left flank was prepped and draped in sterile fashion. In addition to moderate sedation, 10 mL of 1% Xylocaine was infused into the local soft tissues.   Under CT guidance, a 17 gauge guiding needle was placed into the renal cortex. Coaxially, an 18 gauge needle was used to make 3 core biopsies. Tissue submitted in routine renal biopsy tubes.    No complications.    D-Stat flowable hemostatic agent injected through the guiding needle.    SEDATION: Versed 1 mg. Fentanyl 75 mcg. The procedure was performed with administration intravenous conscious sedation with appropriate preoperative, intraoperative, and postoperative evaluation.    25 minutes of supervised face to face conscious sedation time was provided by a radiology nurse under my direct supervision.      Impression    IMPRESSION:  1.  Successful CT-guided renal biopsy.      Reference CPT Code: 73335, 59662, 77864, 30136       Discharge Medications   Current Discharge Medication List        START taking these medications    Details   ferrous sulfate (FEROSUL) 325 (65 Fe) MG tablet Take 1 tablet (325 mg) by mouth every other day  Qty: 30 tablet, Refills: 0    Associated Diagnoses: ROXIE (acute kidney injury) (H24)      levofloxacin (LEVAQUIN) 250 MG tablet Take 1 tablet (250 mg) by mouth every 48 hours Take on 2/15/24.  Qty: 1 tablet, Refills: 0    Associated Diagnoses: Acute cystitis without hematuria           CONTINUE these medications which have CHANGED    Details   amLODIPine (NORVASC)  2.5 MG tablet Take 2 tablets (5 mg) by mouth daily  Qty: 60 tablet, Refills: 0    Associated Diagnoses: Accelerated hypertension           CONTINUE these medications which have NOT CHANGED    Details   acetaminophen (TYLENOL) 325 MG tablet [ACETAMINOPHEN (TYLENOL) 325 MG TABLET] Take 650 mg by mouth every 4 (four) hours as needed for pain.      aspirin 81 MG EC tablet [ASPIRIN 81 MG EC TABLET] Take 81 mg by mouth daily.      lansoprazole (PREVACID) 30 MG capsule Take 30 mg by mouth at bedtime      polyethylene glycol (MIRALAX) 17 GM/Dose powder Take 17 g by mouth daily as needed for constipation  Qty: 510 g, Refills: 0    Associated Diagnoses: Other constipation      simvastatin (ZOCOR) 20 MG tablet Take 1 tablet (20 mg) by mouth at bedtime  Qty: 90 tablet, Refills: 0    Associated Diagnoses: Hyperlipidemia, unspecified hyperlipidemia type      vit C,E-Ie-ouxtd-lutein-zeaxan (PRESERVISION AREDS-2) 250-90-40-1 mg cap [VIT C,V-RE-RRWOG-LUTEIN-ZEAXAN (PRESERVISION AREDS-2) 250-90-40-1 MG CAP] Take 1 capsule by mouth 2 (two) times a day.           STOP taking these medications       gabapentin (NEURONTIN) 300 MG capsule Comments:   Reason for Stopping:             Allergies   Allergies   Allergen Reactions    Ezetimibe-Simvastatin GI Disturbance    Metformin      Possible ALIYA 2/24

## 2024-02-14 NOTE — PROGRESS NOTES
HEMODIALYSIS TREATMENT NOTE    Date: 2/14/2024  Time: 9:46 AM    Data:  Pre Wt: 79.6 kg (175 lb 7.8 oz)   Desired Wt:to be established  Post Wt:   Weight change: 0 kg  Ultrafiltration - Post Run Net Total Removed (mL): 0mL  Vascular Access Status: CVC  patent  Dialyzer Rinse: Streaked  Total Blood Volume Processed: 70.6 L   Total Dialysis (Treatment) Time: 3 hours   Dialysate Bath: K 3, Ca 2.25  Other: Heparin Lock CVC    Lab:   No    Interventions:  0 liter UF as tolerated maintaining SBP>90. Heparin lock CVC, PRN Albumin. See MAR, flow sheet and MD orders.     Assessment:  Pt A&Ox4, NAD, saturating 96% on RA, LS CTA bilateral anterior, +1 in BLE, no albumin needed with 0 UF cumulative. Pt calm and cooperative. HTN towards end of HD.Tolerated HD well. Report given to PCN.      Plan:    Per Renal Team

## 2024-02-14 NOTE — PROGRESS NOTES
Occupational Therapy Discharge Summary    Reason for therapy discharge:    Discharged to transitional care facility.    Progress towards therapy goal(s). See goals on Care Plan in [facility] electronic health record for goal details.  Goals not met.  Barriers to achieving goals:   discharge from facility.    Therapy recommendation(s):    Continued therapy is recommended.  Rationale/Recommendations:  TCU for all ADLs.

## 2024-02-14 NOTE — PROGRESS NOTES
RENAL PROGRESS NOTE    CC:ROXIE    ASSESSMENT & PLAN:     PLAN:   -MWF HD while here  -Care Coordinated confirmed  [facility] admission went through and pt has a  TTS 1st shift spot, I called Out pt HD orders to [facility]. Renal okay with discharge when medically cleared.   -S/P Renal Bx 2/13/24, for definitive ROXIE/CKD Dx.  -Start PO Iron supplement every other day.  -BMP daily.     ARF/CKD: has underlying CKD with CR 1.7 in July and CR running between 1.4 and 1.8 during  recent. hospitalization--noted to be rising on 2/6 and further to 5.2 2/11, then started on dialysis.  Etiology of both ARF/subacute/ CKD is unclear (maybe some dehydration at different points but seems out of proportion to degree and acuity of ARF). Serologies pending. d/w ptrisk and benefits of a biopsy, would favor Bx for diagnosis and prognosis. Discussed risk/benefit again for renal biopsy, pt would like to proceed with BX, entered orders/Not on AC/BP stable.       DM2: on insulin. metforin on hold.    HLD: on statin    Anemia:  following hgb.    A flutter: on ASA PTA    Metabolic Acidosis: Resolved with bicarb gtt and HD.  thought related to metformin (elevated lactate) and ARF. Now off metformin    Hyperkalemia: resolved with HD    Hyponatremia: follow with HD    Pseudo-hypocalcemia: Ca+ corrected for hypoalbuminemia WNL    HTN: on norvasc; bp ok; avoid HoTN    Volume status: seems ok, UF with HD; ?was dehydrated at some point prior to admiit       SUBJECTIVE:      He is sitting up in bed, appears comfortable  Saw pt on HD, tolerating therapy well today  HD going well, now at full RX  Discussed with Care coordinator that pt has an OP HD unit [place] TTS HD schedule and cleared to discharge to [facility]. Renal okay with discharge. I called orders to OP HD unit   Dialysis  MWF while here, SW consulted for OP dialysis coordination,   Denies n, v, c, d, sob, fever, rash or CP  +LLE ongoing  We discussed in depth level of  ROXIE/CKD, etiology unclear, Risk/Benefit of renal Bx, Bx favorable for Dx and prognosis. PT would like to proceed with renal Bx, s/p Bx today, went well without complications.   HD tomorrow  Discussed labs/plan and answered all questions.       OBJECTIVE:  Physical Exam   Temp: 98.5  F (36.9  C) Temp src: Oral BP: (!) 146/67 Pulse: 67   Resp: 15 SpO2: 93 % O2 Device: None (Room air) Oxygen Delivery: 2 LPM  Vitals:    02/12/24 0413 02/13/24 0533 02/14/24 0751   Weight: 80.8 kg (178 lb 2.1 oz) 75.1 kg (165 lb 9.1 oz) 79.6 kg (175 lb 7.8 oz)     Vital Signs with Ranges  Temp:  [97.4  F (36.3  C)-98.6  F (37  C)] 98.5  F (36.9  C)  Pulse:  [65-88] 67  Resp:  [10-20] 15  BP: (121-173)/(56-85) 146/67  SpO2:  [92 %-99 %] 93 %  I/O last 3 completed shifts:  In: -   Out: 370 [Urine:370]    Patient Vitals for the past 72 hrs:   Weight   02/14/24 0751 79.6 kg (175 lb 7.8 oz)   02/13/24 0533 75.1 kg (165 lb 9.1 oz)   02/12/24 0413 80.8 kg (178 lb 2.1 oz)     Intake/Output Summary (Last 24 hours) at 2/12/2024 0844  Last data filed at 2/12/2024 0417  Gross per 24 hour   Intake 120 ml   Output 500 ml   Net -380 ml       PHYSICAL EXAM:  GEN: NAD, aox3  CV: RRR   Lung: clear and equal  Ab: soft and NT  Ext: +1-2 LLE BL and well perfused  Skin: no rash  Psych: normal affect  Neuro: No asterixis, grossly intact.       LABORATORY STUDIES:     Recent Labs   Lab 02/14/24  0721 02/13/24  0619 02/12/24  0603 02/11/24  0447 02/10/24  0552 02/09/24  0941   WBC 9.7 8.5 9.6 11.9* 12.7* 23.1*   RBC 2.64* 2.50* 2.51* 2.63* 2.37* 3.19*   HGB 8.5* 8.1* 8.1* 8.5* 7.7* 10.4*   HCT 25.2* 24.3* 23.8* 24.8* 22.3* 32.3*    350 362 370 320 412       Basic Metabolic Panel:  Recent Labs   Lab 02/14/24  0721 02/14/24  0240 02/13/24  2239 02/13/24  1638 02/13/24  1144 02/13/24  0812 02/13/24  0619 02/12/24  0831 02/12/24  0603 02/11/24  0758 02/11/24  0447 02/10/24  0825 02/10/24  0552 02/09/24  1443 02/09/24  1422 02/09/24  1248 02/09/24  0941   NA  134*  --   --   --   --   --  133*  --  132*  --  133*  --  132*  --   --   --  127*   POTASSIUM 4.0  --   --   --   --   --  4.4  --  4.4  --  4.3  --  4.5  --  5.1   < > 6.3*   CHLORIDE 97*  --   --   --   --   --  98  --  94*  --  94*  --  93*  --   --   --  92*   CO2 24  --   --   --   --   --  27  --  26  --  27  --  23  --   --   --  10*   BUN 26.0*  --   --   --   --   --  21.2  --  34.6*  --  29.7*  --  47.5*  --   --   --  77.8*   CR 4.54*  --   --   --   --   --  3.66*  --  4.42*  --  3.42*  --  4.05*  --   --   --  5.15*   * 107* 135* 118* 111* 113* 122*   < > 115*   < > 117*   < > 171*   < >  --    < > 83   JAYRO 8.6*  --   --   --   --   --  8.6*  --  8.3*  --  8.0*  --  8.1*  --   --   --  8.6*    < > = values in this interval not displayed.       INR  Recent Labs   Lab 02/13/24 0753   INR 1.42*        Recent Labs   Lab Test 02/14/24  0721 02/13/24  0753 02/13/24  0619   INR  --  1.42*  --    WBC 9.7  --  8.5   HGB 8.5*  --  8.1*     --  350       Personally reviewed current labs    Devora MCDOWELL-BC  [facility]  [phone]

## 2024-02-14 NOTE — PLAN OF CARE
"Pt discharged to TCU with [facility] wheelchair transport. Most belongings left with patient including contents of safe in the room and home medications which were in the med room. One bag is to be couriered to the TCU due to the 2 bag limit policy the wheelchair transport has. Pt is agreeable to  for his remaining bag. Pt was able to arrange transport to and from dialysis tomorrow at TCU. IV removed. VSS.        Goal Outcome Evaluation:    Problem: Adult Inpatient Plan of Care  Goal: Plan of Care Review  Description: The Plan of Care Review/Shift note should be completed every shift.  The Outcome Evaluation is a brief statement about your assessment that the patient is improving, declining, or no change.  This information will be displayed automatically on your shift  note.  Outcome: Adequate for Care Transition  Goal: Patient-Specific Goal (Individualized)  Description: You can add care plan individualizations to a care plan. Examples of Individualization might be:  \"Parent requests to be called daily at 9am for status\", \"I have a hard time hearing out of my right ear\", or \"Do not touch me to wake me up as it startles  me\".  Outcome: Adequate for Care Transition  Goal: Absence of Hospital-Acquired Illness or Injury  Outcome: Adequate for Care Transition  Intervention: Identify and Manage Fall Risk  Recent Flowsheet Documentation  Taken 2/14/2024 1520 by Kalee Lomeli, RN  Safety Promotion/Fall Prevention: safety round/check completed  Taken 2/14/2024 1400 by Kalee Lomeli, RN  Safety Promotion/Fall Prevention: safety round/check completed  Intervention: Prevent Infection  Recent Flowsheet Documentation  Taken 2/14/2024 1520 by Kalee Lomeli, RN  Infection Prevention:   rest/sleep promoted   hand hygiene promoted   single patient room provided  Taken 2/14/2024 1400 by Kalee Lomeli, RN  Infection Prevention:   rest/sleep promoted   hand hygiene promoted   single patient room provided  Goal: Optimal Comfort " and Wellbeing  Outcome: Adequate for Care Transition  Goal: Readiness for Transition of Care  Outcome: Adequate for Care Transition     Problem: Risk for Delirium  Goal: Optimal Coping  Outcome: Adequate for Care Transition  Goal: Improved Behavioral Control  Outcome: Adequate for Care Transition  Intervention: Minimize Safety Risk  Recent Flowsheet Documentation  Taken 2/14/2024 1520 by Kalee Lomeli RN  Communication Enhancement Strategies:   call light answered in person   communication board used   repetition utilized   one-step directions provided   verbal and visual cues paired  Taken 2/14/2024 1400 by Kalee Lomeli RN  Communication Enhancement Strategies:   call light answered in person   communication board used   repetition utilized   one-step directions provided   verbal and visual cues paired  Goal: Improved Attention and Thought Clarity  Outcome: Adequate for Care Transition  Goal: Improved Sleep  Outcome: Adequate for Care Transition     Problem: Hemodialysis  Goal: Safe, Effective Therapy Delivery  Outcome: Adequate for Care Transition  Goal: Effective Tissue Perfusion  Outcome: Adequate for Care Transition  Goal: Absence of Infection Signs and Symptoms  Outcome: Adequate for Care Transition  Intervention: Prevent or Manage Infection  Recent Flowsheet Documentation  Taken 2/14/2024 1520 by Kalee Lomeli RN  Infection Prevention:   rest/sleep promoted   hand hygiene promoted   single patient room provided  Taken 2/14/2024 1400 by Kalee Lomeli RN  Infection Prevention:   rest/sleep promoted   hand hygiene promoted   single patient room provided     Problem: Electrolyte Imbalance  Goal: Electrolyte Balance  Outcome: Adequate for Care Transition     Problem: UTI (Urinary Tract Infection)  Goal: Improved Infection Symptoms  Outcome: Adequate for Care Transition

## 2024-02-14 NOTE — PLAN OF CARE
Goal Outcome Evaluation:    Pt alert and oriented. VSS. Pt reports pain of 3/10. Pt requested to take his bed time medications on time so he can go to sleep. Pt has been sleeping for most of the shift. Pt calls appropriately. Call light within reach. Bed alarm on.    Problem: Adult Inpatient Plan of Care  Goal: Optimal Comfort and Wellbeing  Outcome: Progressing     Problem: Risk for Delirium  Goal: Improved Behavioral Control  Outcome: Progressing  Intervention: Minimize Safety Risk  Recent Flowsheet Documentation  Taken 2/14/2024 0100 by Kristen Mello RN  Enhanced Safety Measures: room near unit station  Taken 2/13/2024 2210 by Kristen Mello RN  Enhanced Safety Measures: room near unit station

## 2024-02-14 NOTE — PROGRESS NOTES
Physical Therapy Discharge Summary    Reason for therapy discharge:    Discharged to transitional care facility.    Progress towards therapy goal(s). See goals on Care Plan in [facility] electronic health record for goal details.  Goals not met.  Barriers to achieving goals:   discharge from facility.    Therapy recommendation(s):    Continued therapy is recommended.  Rationale/Recommendations:  TCU.

## 2024-02-14 NOTE — CONSULTS
Care Management Discharge Note    Discharge Date: 02/14/2024       Discharge Disposition:  [facility] (SNF)    Discharge Services: Transportation Services    Discharge DME: None    Discharge Transportation: agency    Private pay costs discussed: transportation costs    PAS Confirmation Code:  n/a (returning to the same facility)    Patient/family educated on Medicare website which has current facility and service quality ratings: yes    Education Provided on the Discharge Plan: Yes (AVS per bedside RN)    Persons Notified of Discharge Plans: patient    Patient/Family in Agreement with the Plan: yes    Handoff Referral Completed: Yes    Additional Information:  CM reviewed chart. CM met with patient he would like [place] dialysis [place] a couple times a month. He spends 1-2 weeks at a time at his cabin. 8:40 AM     CM spoke to Carmen with [facility] admissions. Patient would have to start at one location and then request a visit transfer when he wants to go the the cabin. Carmen will submit to complete the process and get a chair time. Still waiting for Hep B core antibody but can submit to get a chair time without that lab.8:49 AM     [facility] the closest to patient's cabin. [place] Martin phone # [phone]. 8:56 AM     CM sent discharge orders to   [facility] ([facility]). 12:06 PM     Patient requested CM arrange for transportation to [facility]. Patient is aware that he may be billed for transportation. [facility] wheelchair ride arranged for today with a pickup time between 4:10 pm-4:55 pm. 12:33 PM     CM confirmed with Carmen ([facility] admissions) that patient has been accepted and can start dialysis at [facility] tomorrow (2/15/24). Patient will discharge to [facility] TCU. CM arranged transportation for 2/15/24 from TCU to dialysis. CM updated Aiyana at [facility]. CM updated patient.     CM arranged  [facility] ( phone #  [phone]) to dialysis. Patient will be picked up at 5:50 am at TCU on first day of dialysis (2/15/24). Patient and TCU aware. Patient needs to call  [facility] [employer] (phone # [phone]) today to prepay and put a credit card on file. CM gave the contact information to patient.     Patient will update family.     CM consult for Discharge Planning/Disposition. Will need out patient dialysis coordinated upon discharge.    Rubi Will RN

## 2024-02-15 ENCOUNTER — LAB REQUISITION (OUTPATIENT)
Dept: LAB | Facility: CLINIC | Age: 79
End: 2024-02-15
Payer: COMMERCIAL

## 2024-02-15 DIAGNOSIS — Z51.81 ENCOUNTER FOR THERAPEUTIC DRUG LEVEL MONITORING: ICD-10-CM

## 2024-02-15 NOTE — PROGRESS NOTES
Duong Malik  : 1947   Payor: Subha Amezquita / Plan: [facility] / Product Type: PPO /    [id number]  at [facility]  [address], [address], [place]  [phone]   [phone]         OUTPATIENT PHYSICAL THERAPY:Daily Note 10/26/2017    ICD-10: Treatment Diagnosis:Low back pain (M54.5); Muscle weakness (generalized) (M62.81); Unsteadiness on feet (R26.81)  Precautions/Allergies:   Review of patient's allergies indicates no known allergies. Fall Risk Score: 0 (? 5 = High Risk)  MD Orders: PT cory and tx MEDICAL/REFERRING DIAGNOSIS:  low back pain   DATE OF ONSET: Late   REFERRING PHYSICIAN: Campbell Leblanc MD  RETURN PHYSICIAN APPOINTMENT: 6 months     ASSESSMENT:  Ms. Anali Menon presents to PT cory w/ c/o LBP that has been bothering her since she was in her 19's. She has a history of a discectomy in  and her most recent MRI shows degenerative changes w/ stenosis. She displayed decreased lumbar motion, decreased balance, and decreased B LE strength. She will benefit from continued skilled PT services to improve her deficits listed below and to progress towards her PLOF. PROBLEM LIST (Impacting functional limitations):  1. Decreased Strength  2. Decreased ADL/Functional Activities  3. Decreased Transfer Abilities  4. Decreased Balance  5. Increased Pain  6. Decreased Activity Tolerance  7. Decreased Flexibility/Joint Mobility  8. Decreased Boyd with Home Exercise Program INTERVENTIONS PLANNED:  1. Balance Exercise  2. Cold  3. Electrical Stimulation  4. Heat  5. Home Exercise Program (HEP)  6. Manual Therapy  7. Range of Motion (ROM)  8. Therapeutic Activites  9. Therapeutic Exercise/Strengthening   TREATMENT PLAN:  Effective Dates: 17 TO 18.   Frequency/Duration: 1 time a week for 8 weeks  GOALS: (Goals have been discussed and agreed upon with patient.)  Discharge Goals: Time Frame: 8 weeks  Pt will be independent w/ Occupational Therapy Discharge Summary    Reason for therapy discharge:    Discharged to transitional care facility.    Progress towards therapy goal(s). See goals on Care Plan in [facility] electronic health record for goal details.  Goals not met.  Barriers to achieving goals:   discharge from facility.    Therapy recommendation(s):    Continued therapy is recommended.  Rationale/Recommendations:  OT within TCU setting is appropriate.       HEP in order to improve outcomes and decrease pain. Pt will report pain of 3/10 or less while walking in grocery store in order to improve functional mobility. Pt will have Oswestry score of 23 or less in order to report decreased pain and decreased functional impairments. Pt will have B LE strength of 4/5 or greater in all major muscle groups in order to increase her safety w/ functional mobility. The information in this section was collected on 9/20/17 (except where otherwise noted). HISTORY:   History of Present Injury/Illness (Reason for Referral):  Pt has been having back pain since she was in her 19's. She had a discectomy in 1993 and her most recent MRI shows degenerative changes w/ stenosis. Past Medical History/Comorbidities:   Ms. Anali Menon  has a past medical history of Chronic musculoskeletal pain; COPD; GERD (gastroesophageal reflux disease); GERD (gastroesophageal reflux disease); History of back surgery; History of colon polyps; History of left bundle branch block; History of tooth extraction; History of tubal ligation; Hypertension; Hypothyroidism; Menopause; OA (osteoarthritis); and Visit for dental examination (02/2017). She also has no past medical history of Difficult intubation; Malignant hyperthermia due to anesthesia; Nausea & vomiting; or Pseudocholinesterase deficiency. Ms. Anali Menon  has a past surgical history that includes back surgery; heent; tubal ligation; colsc flx w/removal lesion by hot bx forceps (9/13/2013); colsc flx w/rmvl of tumor polyp lesion snare tq (12/19/2016); and colonoscopy (N/A, 12/19/2016). Social History/Living Environment:     Lives w/ spouse in 1 story home w/ tub/shower combo. She has 6 steps to enter her home.    Prior Level of Function/Work/Activity:  Works - clerical computer job full time   Dominant Side:         RIGHT    Current Medications:       Current Outpatient Prescriptions:     naproxen (NAPROSYN) 500 mg tablet, Take 1 Tab by mouth two (2) times daily (with meals). , Disp: 60 Tab, Rfl: 11    losartan (COZAAR) 100 mg tablet, Take 1 Tab by mouth daily for 90 days. , Disp: 90 Tab, Rfl: 3    levothyroxine (SYNTHROID) 150 mcg tablet, Take 1 Tab by mouth Daily (before breakfast). , Disp: 90 Tab, Rfl: 1    alendronate (FOSAMAX) 70 mg tablet, Take 1 Tab by mouth every seven (7) days. Indications: POST-MENOPAUSAL OSTEOPOROSIS, Disp: 12 Tab, Rfl: 3    atenolol (TENORMIN) 50 mg tablet, Take 2 Tabs by mouth daily for 90 days. , Disp: 180 Tab, Rfl: 3    BIOTIN PO, Take  by mouth., Disp: , Rfl:     omeprazole (PRILOSEC) 20 mg capsule, Take 20 mg by mouth every morning. Indications: GASTROESOPHAGEAL REFLUX, Disp: , Rfl:     multivitamin (ONE A DAY) tablet, Take 1 Tab by mouth nightly. Last 9/9/13, Disp: , Rfl:     aspirin 81 mg tablet, Take 81 mg by mouth every morning. Last 9/10/13, Disp: , Rfl:     calcium 500 mg Tab, Take  by mouth daily. , Disp: , Rfl:     ascorbic acid (VITAMIN C) 500 mg tablet, Take  by mouth daily. , Disp: , Rfl:    Date Last Reviewed:  10/26/2017   EXAMINATION:   Observation/Orthostatic Postural Assessment: Forward flexed in stand and sit, rounded shoulders  Palpation:          Tender in R hamstring and behind knee  Functional Mobility:         Gait/Ambulation: Forward flexed, slow, antalgic        Transfers:  Able to rise w/o UE assist         Bed Mobility:  Extra time and effort required   Balance:          Decreased on B LEs <10sec   Sensation:         Decreased on L medial malleolus, otherwise intact w/ light touch to B LEs     Joint/Muscle ROM Strength    Hip flexion WFL R 4-/5, L 4/5    Hip extension Decreased R 3/5, L 3+/5    Hip abduction WFL R 4-/5, L 4/5    Knee flexion WFL 5/5 R and L    Knee extension 35* R and L hamstring stretch test 5/5 R and L    DF WFL 5/5 R and L    Lumbar Decreased extension Decreased core strength          Body Structures Involved:  1. Nerves  2. Bones  3. Joints  4.  Muscles Body Functions Affected:  1. Sensory/Pain  2. Neuromusculoskeletal  3. Movement Related Activities and Participation Affected:  1. General Tasks and Demands  2. Mobility  3. Self Care  4. Domestic Life  5. Interpersonal Interactions and Relationships  6. Community, Social and Civic Life   CLINICAL PRESENTATION:   CLINICAL DECISION MAKING:   Outcome Measure: Tool Used: Modified Oswestry Low Back Pain Questionnaire  Score:  Initial: 28/50  Most Recent: X/50 (Date: -- )   Interpretation of Score: Each section is scored on a 0-5 scale, 5 representing the greatest disability. The scores of each section are added together for a total score of 50. Score 0 1-10 11-20 21-30 31-40 41-49 50   Modifier CH CI CJ CK CL CM CN     ? Mobility - Walking and Moving Around:     - CURRENT STATUS: CK - 40%-59% impaired, limited or restricted    - GOAL STATUS: CK - 40%-59% impaired, limited or restricted    - D/C STATUS:  ---------------To be determined---------------      Medical Necessity:   · Patient is expected to demonstrate progress in strength, range of motion and balance to increase independence with functional tasks. Reason for Services/Other Comments:  · Patient continues to demonstrate capacity to improve strength, ROM, balance which will increase independence. TREATMENT:   (In addition to Assessment/Re-Assessment sessions the following treatments were rendered)  Pre-treatment Symptoms/Complaints:  Pt reports she went on a road trip and spent a lot of time in the car. She notes she stretched a lot so it wasn't that bad. Pain: Initial:   2/10  Post Session:  0/10     THERAPEUTIC EXERCISE: (25 minutes):  Exercises per grid below to improve mobility, strength, balance and dynamic movement of back - bilateral and hip - bilateral to improve functional bending and lifting.   Required minimal verbal and manual cues to promote proper body alignment, promote proper body posture and promote proper body mechanics. Progressed resistance, range, repetitions and complexity of movement as indicated. MECHANICAL TRACTION: (20 minutes): Traction was used due to the patient's lumbago in order to relieve pain in or originating from his spine. 95# max, 85# min     Date:  9/20/17 Date:  9/27/17 Date:  10/4/17 Date:  10/12/17 Date:  10/17/17 Date:  10/26/17   Activity/Exercise Parameters Parameters Parameters Parameters Parameters Parameters   HEP Issued         Repeated flexion in supine 10x2 (stronger)  20x w/ OP 2x20x w/ OP 3x10 w/ OP 3x10 w/ OP   Repeated flexion in sit 10x5 (strongest) 10x 10x      Repeated flexion in stand 10x1 (not the best)        Lower trunk rotation 10x each side 10x each side 10x each side 10x each side 10x each side 10x each side   TA contraction 10x  2x10 2x10 2x10    Hamstring stretch 3x30 sec each side 3x30 sec on B LEs in sit 3x30 sec on B LEs in sit  3x30 sec on B LEs in sit     3x30 sec on B LEs, supine 3x30 sec on B LEs, supine   Clamshells  3x10, YTB 3x10, YTB 3x10, YTB 2x10, YTB 3x10 YTB   SLR   3x10, BLEs      Bridges    15x, YTB     Leg press  75#, 30x 75#, 40x 75#, 40x     Mini squats  1x too much knee pain ------------------      NuStep   10 mins, 2.5 10 mins, 2.5 10 mins, 2.5 10 mins, 3.0   Cat camel         Prayer stretch   Attempted - unable      Dead bugs   10x      [employer] Portal      Treatment/Session Assessment:    · Response to Treatment:  Pt progressing towards her goals. She reported feeling much better and more relaxed following traction. ·  Compliance with Program/Exercises: Will assess as treatment progresses. · Recommendations/Intent for next treatment session: \"Next visit will focus on advancements to more challenging activities\".   Variance from POC:  none  PT Patient Time In/Time Out  Time In: 0730  Time Out: 0815    Eddie Brooke, PT

## 2024-02-16 ENCOUNTER — TRANSITIONAL CARE UNIT VISIT (OUTPATIENT)
Dept: GERIATRICS | Facility: CLINIC | Age: 79
End: 2024-02-16
Payer: COMMERCIAL

## 2024-02-16 ENCOUNTER — TELEPHONE (OUTPATIENT)
Dept: GERIATRICS | Facility: CLINIC | Age: 79
End: 2024-02-16

## 2024-02-16 VITALS
HEART RATE: 67 BPM | SYSTOLIC BLOOD PRESSURE: 141 MMHG | OXYGEN SATURATION: 93 % | DIASTOLIC BLOOD PRESSURE: 78 MMHG | BODY MASS INDEX: 25.31 KG/M2 | TEMPERATURE: 97.6 F | RESPIRATION RATE: 18 BRPM | WEIGHT: 171.4 LBS

## 2024-02-16 DIAGNOSIS — R53.81 PHYSICAL DECONDITIONING: Primary | ICD-10-CM

## 2024-02-16 DIAGNOSIS — Z99.2 DIALYSIS PATIENT (H): ICD-10-CM

## 2024-02-16 LAB
ANION GAP SERPL CALCULATED.3IONS-SCNC: 10 MMOL/L (ref 7–15)
BUN SERPL-MCNC: 12.1 MG/DL (ref 8–23)
CALCIUM SERPL-MCNC: 8.5 MG/DL (ref 8.8–10.2)
CHLORIDE SERPL-SCNC: 100 MMOL/L (ref 98–107)
CREAT SERPL-MCNC: 3.22 MG/DL (ref 0.67–1.17)
DEPRECATED HCO3 PLAS-SCNC: 26 MMOL/L (ref 22–29)
EGFRCR SERPLBLD CKD-EPI 2021: 19 ML/MIN/1.73M2
ERYTHROCYTE [DISTWIDTH] IN BLOOD BY AUTOMATED COUNT: 13.3 % (ref 10–15)
GLUCOSE SERPL-MCNC: 108 MG/DL (ref 70–99)
HCT VFR BLD AUTO: 21.5 % (ref 40–53)
HGB BLD-MCNC: 7.1 G/DL (ref 13.3–17.7)
MCH RBC QN AUTO: 32.6 PG (ref 26.5–33)
MCHC RBC AUTO-ENTMCNC: 33 G/DL (ref 31.5–36.5)
MCV RBC AUTO: 99 FL (ref 78–100)
PLATELET # BLD AUTO: 332 10E3/UL (ref 150–450)
POTASSIUM SERPL-SCNC: 3.4 MMOL/L (ref 3.4–5.3)
RBC # BLD AUTO: 2.18 10E6/UL (ref 4.4–5.9)
SODIUM SERPL-SCNC: 136 MMOL/L (ref 135–145)
SPECIMEN STATUS: NORMAL
WBC # BLD AUTO: 8.7 10E3/UL (ref 4–11)

## 2024-02-16 PROCEDURE — 99310 SBSQ NF CARE HIGH MDM 45: CPT | Performed by: NURSE PRACTITIONER

## 2024-02-16 PROCEDURE — 36415 COLL VENOUS BLD VENIPUNCTURE: CPT | Mod: ORL | Performed by: NURSE PRACTITIONER

## 2024-02-16 PROCEDURE — 85027 COMPLETE CBC AUTOMATED: CPT | Mod: ORL | Performed by: NURSE PRACTITIONER

## 2024-02-16 PROCEDURE — P9604 ONE-WAY ALLOW PRORATED TRIP: HCPCS | Mod: ORL | Performed by: NURSE PRACTITIONER

## 2024-02-16 PROCEDURE — 80048 BASIC METABOLIC PNL TOTAL CA: CPT | Mod: ORL | Performed by: NURSE PRACTITIONER

## 2024-02-16 NOTE — TELEPHONE ENCOUNTER
[facility] Geriatrics Lab Note     Provider: ELLY Ernst  Facility: [facility]  Facility Type:  TCU    Allergies   Allergen Reactions    Ezetimibe-Simvastatin GI Disturbance    Metformin      Possible ALIYA 2/24       Labs Reviewed by provider: Heme 2, BMP     Verbal Order/Direction given by Provider: Potassium 20meq x 1.  Call dialysis and ask them to draw a Hgb tomorrow at dialysis.      Provider giving Order:  ELLY Ernst    Verbal Order given to: Kenia([phone])    Jay Jay Koehler RN

## 2024-02-16 NOTE — PROGRESS NOTES
Reviewed wherever he goes and does a photo she will M [facility] GERIATRIC SERVICES  Chief Complaint   Patient presents with    [facility] F/U     [facility] Record Number:  [id number]  Place of Service where encounter took place:  No question data found.  Code Status: unknown     HISTORY:      HPI:  Pa García  is 78 year old (1945) undergoing physical and occupational therapy. He is with past medical history hypertension, chronic bilateral hip pain, diabetes type 2, GERD, HDL,Excerpted from records  He presented  for progressively worsening generalized weakness and mechanical fall.  No LOS, did not hit head.  No associated symptoms.  No chest pain or palpitations. Presented afebrile, vitally stable, satting well on RA.  He had mild elevation of troponin that down trended on repeat otherwise CBC, CMP Pro-Julio, lactic acid, UA, chest x-ray, UA, flu/RSV/COVID were unremarkable.  Blood cultures have been no growth to date.  He was also noted to have atrial flutter with an elevated troponin per records the flutter appears to be new.  He was asymptomatic, cardiology was consulted and he had an echo that had normal LV function without wall motion abnormalities.  He was recommended he be a candidate for anticoagulation however patient refused due to history of recurrent falls.  He plans to follow-up outpatient cardiology for possible electrophysiology consult  He was recovering in the TCU and was sent to the ER for elevated creatinine  on 2/9/24 and returned to the TCU on 2/14/24 Excerpted from records   He admitted on 2/9/2024 for acute kidney injury in the setting of suspected metformin associated lactic acidosis.  He was admitted again  for acute renal failure with hyperkalemia and high anion gap metabolic acidosis. S/p HD on 2/9/24, 2/10/24, 2/12/24, 2/14/24. S/p renal biopsy, pathology read is pending. Plan is for HD following a TTS schedule for 3 months.       Today he was seen at the bedside to review vital  signs, labs, and posthospitalization.  Recently noted to have hyperkalemia was given Kayexalate.  Repeat labs worsening.  Creatinine went from 2.54 to 4.08.  Continue to be hyperkalemic at 5.7 with a sodium of 131.  He was also with nausea and vomiting and was sent to the ER at that time.  Please see above.  He denied chest pain shortness of breath cough, Denied constipation or diarrhea.  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  He was on metformin which was recently discontinued due to elevated creatinine.   He does have chronic pain bilateral hips left more pronounced than the right.  Per hospital records he did receive bilateral hip injections in the hospital.  Per his report he ambulates with a cane and a four-wheel walker for longer distances.  His weights were reviewed which showed a significant weight gain in 1 week of approximately 20 pounds. He was placed on daily weights. His lung sounds were clear throughout all fields he was noted to have 1-2+ lower extremity edema and compression stockings were ordered.  He completed Levaquin on 2/15/2024 for a UTI.  Last hemoglobin 7.1 and he will have repeat labs in the a.m.    ALLERGIES:Ezetimibe-simvastatin and Metformin    PAST MEDICAL HISTORY: No past medical history on file.    PAST SURGICAL HISTORY:   has a past surgical history that includes back surgery; Lumbar Laminectomy (Bilateral, 3/8/2016); and Lumbar Laminectomy (3/13/2016).    FAMILY HISTORY: family history includes Cancer in his maternal grandfather, maternal grandmother, paternal grandfather, and paternal grandmother; Dementia in his father.    SOCIAL HISTORY:  reports that he has never smoked. He has never used smokeless tobacco. He reports current alcohol use. He reports that he does not use drugs.    ROS:  Constitutional: Negative for activity change, appetite change, fatigue and fever. HX Falls  HENT: Negative for congestion.  Right neck port currently undergoing dialysis  Tuesday Thursday Saturday  Respiratory: Negative for cough, shortness of breath and wheezing.    Cardiovascular: Negative for chest pain and positive   leg swelling.   Gastrointestinal: Negative for abdominal distention, abdominal pain, constipation, diarrhea   Patient genitourinary: Negative for dysuria.   Musculoskeletal: Positive  for arthralgia. Negative for back pain. Bilateral hips   Skin: Negative for color change and wound. Per records stage 1 coccyx   Neurological: Negative for dizziness.   Psychiatric/Behavioral: Negative for agitation, behavioral problems and confusion.     Physical Exam:  Constitutional:       Appearance: Patient is well-developed.   HENT:      Head: Normocephalic.   Eyes:      Conjunctiva/sclera: Conjunctivae normal.   Neck:      Musculoskeletal: Normal range of motion.   Cardiovascular:      Rate and Rhythm: Normal rate and regular rhythm.      Heart sounds: Normal heart sounds. No murmur.   Pulmonary:      Effort: No respiratory distress.      Breath sounds: Normal breath sounds. No wheezing or rales.   Abdominal:      General: Bowel sounds are normal. There is no distension.      Palpations: Abdomen is soft.      Tenderness: There is no abdominal tenderness.   Musculoskeletal:       Normal range of motion.   1-2+ lower extremity edema   Skin:General:        Skin is warm.   Neurological:         Mental Status: Patient is alert and oriented to person, place, and time.   Psychiatric:         Behavior: Behavior normal.     Vitals:BP (!) 141/78   Pulse 67   Temp 97.6  F (36.4  C)   Resp 18   Wt 77.7 kg (171 lb 6.4 oz)   SpO2 93%   BMI 25.31 kg/m   and Body mass index is 25.31 kg/m .    Lab/Diagnostic data:   Recent Results (from the past 240 hour(s))   Basic metabolic panel    Collection Time: 02/08/24  6:05 AM   Result Value Ref Range    Sodium 131 (L) 135 - 145 mmol/L    Potassium 5.7 (H) 3.4 - 5.3 mmol/L    Chloride 99 98 - 107 mmol/L    Carbon Dioxide (CO2) 16 (L) 22 - 29 mmol/L     Anion Gap 16 (H) 7 - 15 mmol/L    Urea Nitrogen 70.3 (H) 8.0 - 23.0 mg/dL    Creatinine 4.08 (H) 0.67 - 1.17 mg/dL    GFR Estimate 14 (L) >60 mL/min/1.73m2    Calcium 8.4 (L) 8.8 - 10.2 mg/dL    Glucose 100 (H) 70 - 99 mg/dL   ECG 12-LEAD WITH MUSE (LHE)    Collection Time: 02/09/24  9:10 AM   Result Value Ref Range    Systolic Blood Pressure 146 mmHg    Diastolic Blood Pressure 72 mmHg    Ventricular Rate 86 BPM    Atrial Rate 86 BPM    OK Interval 124 ms    QRS Duration 122 ms     ms    QTc 440 ms    P Axis 84 degrees    R AXIS -42 degrees    T Axis 81 degrees    Interpretation ECG       Sinus rhythm with Premature supraventricular complexes  Left axis deviation  Septal infarct (cited on or before 01-FEB-2024)  Abnormal ECG  When compared with ECG of 02-FEB-2024 02:11,  Sinus rhythm has replaced Atrial flutter  Incomplete right bundle branch block is no longer Present  Confirmed by SEE ED PROVIDER NOTE FOR, ECG INTERPRETATION ([id number]),  RADHA DANIELS ([id number]) on 2/9/2024 9:13:49 AM     Basic metabolic panel    Collection Time: 02/09/24  9:41 AM   Result Value Ref Range    Sodium 127 (L) 135 - 145 mmol/L    Potassium 6.3 (HH) 3.4 - 5.3 mmol/L    Chloride 92 (L) 98 - 107 mmol/L    Carbon Dioxide (CO2) 10 (LL) 22 - 29 mmol/L    Anion Gap 25 (H) 7 - 15 mmol/L    Urea Nitrogen 77.8 (H) 8.0 - 23.0 mg/dL    Creatinine 5.15 (H) 0.67 - 1.17 mg/dL    GFR Estimate 11 (L) >60 mL/min/1.73m2    Calcium 8.6 (L) 8.8 - 10.2 mg/dL    Glucose 83 70 - 99 mg/dL   Hepatic panel    Collection Time: 02/09/24  9:41 AM   Result Value Ref Range    Protein Total 6.7 6.4 - 8.3 g/dL    Albumin 3.7 3.5 - 5.2 g/dL    Bilirubin Total 0.3 <=1.2 mg/dL    Alkaline Phosphatase 61 40 - 150 U/L    AST 16 0 - 45 U/L    ALT 9 0 - 70 U/L    Bilirubin Direct <0.20 0.00 - 0.30 mg/dL   Lipase    Collection Time: 02/09/24  9:41 AM   Result Value Ref Range    Lipase 9 (L) 13 - 60 U/L   Lactic acid whole blood    Collection Time: 02/09/24  9:41 AM    Result Value Ref Range    Lactic Acid 6.1 (HH) 0.7 - 2.0 mmol/L   CBC with platelets and differential    Collection Time: 02/09/24  9:41 AM   Result Value Ref Range    WBC Count 23.1 (H) 4.0 - 11.0 10e3/uL    RBC Count 3.19 (L) 4.40 - 5.90 10e6/uL    Hemoglobin 10.4 (L) 13.3 - 17.7 g/dL    Hematocrit 32.3 (L) 40.0 - 53.0 %     (H) 78 - 100 fL    MCH 32.6 26.5 - 33.0 pg    MCHC 32.2 31.5 - 36.5 g/dL    RDW 13.3 10.0 - 15.0 %    Platelet Count 412 150 - 450 10e3/uL    % Neutrophils 85 %    % Lymphocytes 5 %    % Monocytes 8 %    % Eosinophils 0 %    % Basophils 0 %    % Immature Granulocytes 2 %    NRBCs per 100 WBC 0 <1 /100    Absolute Neutrophils 19.7 (H) 1.6 - 8.3 10e3/uL    Absolute Lymphocytes 1.1 0.8 - 5.3 10e3/uL    Absolute Monocytes 1.8 (H) 0.0 - 1.3 10e3/uL    Absolute Eosinophils 0.0 0.0 - 0.7 10e3/uL    Absolute Basophils 0.1 0.0 - 0.2 10e3/uL    Absolute Immature Granulocytes 0.4 <=0.4 10e3/uL    Absolute NRBCs 0.0 10e3/uL   CK total    Collection Time: 02/09/24  9:41 AM   Result Value Ref Range    CK 72 39 - 308 U/L   RBC and Platelet Morphology    Collection Time: 02/09/24  9:41 AM   Result Value Ref Range    Platelet Assessment  Automated Count Confirmed. Platelet morphology is normal.     Automated Count Confirmed. Platelet morphology is normal.    Acanthocytes Slight (A) None Seen    Camille Rods      Basophilic Stippling      Bite Cells      Blister Cells      Tracy Cells      Elliptocytes      Hgb C Crystals      Overton-Jolly Bodies      Hypersegmented Neutrophils      Polychromasia      RBC agglutination      RBC Fragments      Reactive Lymphocytes      Rouleaux      Sickle Cells      Smudge Cells      Spherocytes      Stomatocytes      Target Cells      Teardrop Cells      Toxic Neutrophils      RBC Morphology Confirmed RBC Indices    Blood Culture Peripheral Blood    Collection Time: 02/09/24 10:22 AM    Specimen: Peripheral Blood   Result Value Ref Range    Culture No Growth    Blood  Culture Peripheral Blood    Collection Time: 02/09/24 11:05 AM    Specimen: Peripheral Blood   Result Value Ref Range    Culture No Growth    Lactic acid whole blood    Collection Time: 02/09/24 11:11 AM   Result Value Ref Range    Lactic Acid 6.4 (HH) 0.7 - 2.0 mmol/L   Blood gas venous    Collection Time: 02/09/24 11:33 AM   Result Value Ref Range    pH Venous 7.03 (LL) 7.32 - 7.43    pCO2 Venous 37 (L) 40 - 50 mm Hg    pO2 Venous 53 (H) 25 - 47 mm Hg    Bicarbonate Venous 10 (LL) 21 - 28 mmol/L    Base Excess/Deficit Venous -21.4 (L) -3.0 - 3.0 mmol/L    FIO2 21     Oxyhemoglobin Venous 74 70 - 75 %    O2 Sat, Venous 74.3 70.0 - 75.0 %   Glucose by meter    Collection Time: 02/09/24 12:48 PM   Result Value Ref Range    GLUCOSE BY METER POCT 112 (H) 70 - 99 mg/dL   UA with Microscopic reflex to Culture    Collection Time: 02/09/24 12:49 PM    Specimen: Urine, Gilbert Catheter   Result Value Ref Range    Color Urine Yellow Colorless, Straw, Light Yellow, Yellow    Appearance Urine Cloudy (A) Clear    Glucose Urine Negative Negative mg/dL    Bilirubin Urine Negative Negative    Ketones Urine Negative Negative mg/dL    Specific Gravity Urine 1.012 1.001 - 1.030    Blood Urine 0.2 mg/dL (A) Negative    pH Urine 6.5 5.0 - 7.0    Protein Albumin Urine 300 (A) Negative mg/dL    Urobilinogen Urine <2.0 <2.0 mg/dL    Nitrite Urine Negative Negative    Leukocyte Esterase Urine 500 Michael/uL (A) Negative    Bacteria Urine Many (A) None Seen /HPF    WBC Clumps Urine Present (A) None Seen /HPF    Mucus Urine Present (A) None Seen /LPF    RBC Urine 89 (H) <=2 /HPF    WBC Urine >182 (H) <=5 /HPF   Protein  random urine    Collection Time: 02/09/24 12:49 PM   Result Value Ref Range    Total Protein Urine mg/dL 305.0   mg/dL    Total Protein Urine mg/mg Creat 8.40 (H) 0.00 - 0.20 mg/mg Cr    Creatinine Urine mg/dL 36.3 mg/dL   Urine Culture    Collection Time: 02/09/24 12:49 PM    Specimen: Urine, Gilbert Catheter   Result Value Ref  Range    Culture >100,000 CFU/mL Escherichia coli (A)        Susceptibility    Escherichia coli - JACK     Ampicillin <=2 Susceptible ug/mL     Ampicillin/ Sulbactam <=2 Susceptible ug/mL     Piperacillin/Tazobactam <=4 Susceptible ug/mL     Cefazolin* <=4 Susceptible ug/mL      * Cefazolin JACK breakpoints are for the treatment of uncomplicated urinary tract infections. For the treatment of systemic infections, please contact the laboratory for additional testing.     Cefoxitin <=4 Susceptible ug/mL     Ceftazidime <=1 Susceptible ug/mL     Ceftriaxone <=1 Susceptible ug/mL     Cefepime <=1 Susceptible ug/mL     Gentamicin <=1 Susceptible ug/mL     Tobramycin <=1 Susceptible ug/mL     Ciprofloxacin <=0.25 Susceptible ug/mL     Levofloxacin <=0.12 Susceptible ug/mL     Nitrofurantoin <=16 Susceptible ug/mL     Trimethoprim/Sulfamethoxazole <=1/19 Susceptible ug/mL   Potassium    Collection Time: 02/09/24  1:12 PM   Result Value Ref Range    Potassium 5.2 3.4 - 5.3 mmol/L   Complement C4    Collection Time: 02/09/24  1:12 PM   Result Value Ref Range    C4 Complement 23 13 - 39 mg/dL   Anti Nuclear Cathie IgG by IFA with Reflex    Collection Time: 02/09/24  1:12 PM   Result Value Ref Range    MIGUEL interpretation Negative Negative   ANCA IgG by IFA with Reflex to Titer    Collection Time: 02/09/24  1:12 PM   Result Value Ref Range    Neutrophil Cytoplasmic Antibody <1:10 <1:10    Neutrophil Cytoplasmic Antibody Pattern       The ANCA IFA is <1:10.  No further testing will be performed.   Complement C3    Collection Time: 02/09/24  1:12 PM   Result Value Ref Range    C3 Complement 109 81 - 157 mg/dL   Glucose by meter    Collection Time: 02/09/24  1:42 PM   Result Value Ref Range    GLUCOSE BY METER POCT 119 (H) 70 - 99 mg/dL   Potassium    Collection Time: 02/09/24  2:22 PM   Result Value Ref Range    Potassium 5.1 3.4 - 5.3 mmol/L   Glucose by meter    Collection Time: 02/09/24  2:43 PM   Result Value Ref Range    GLUCOSE  BY METER POCT 172 (H) 70 - 99 mg/dL   Glucose by meter    Collection Time: 02/09/24  3:08 PM   Result Value Ref Range    GLUCOSE BY METER POCT 183 (H) 70 - 99 mg/dL   Glucose by meter    Collection Time: 02/09/24  3:59 PM   Result Value Ref Range    GLUCOSE BY METER POCT 191 (H) 70 - 99 mg/dL   Glucose by meter    Collection Time: 02/09/24  5:28 PM   Result Value Ref Range    GLUCOSE BY METER POCT 193 (H) 70 - 99 mg/dL   Glucose by meter    Collection Time: 02/09/24  6:15 PM   Result Value Ref Range    GLUCOSE BY METER POCT 186 (H) 70 - 99 mg/dL   Lactic acid whole blood    Collection Time: 02/09/24  6:43 PM   Result Value Ref Range    Lactic Acid 5.8 (HH) 0.7 - 2.0 mmol/L   Glucose by meter    Collection Time: 02/09/24  7:15 PM   Result Value Ref Range    GLUCOSE BY METER POCT 178 (H) 70 - 99 mg/dL   Glucose by meter    Collection Time: 02/09/24  9:53 PM   Result Value Ref Range    GLUCOSE BY METER POCT 192 (H) 70 - 99 mg/dL   Lactic acid whole blood    Collection Time: 02/10/24 12:09 AM   Result Value Ref Range    Lactic Acid 5.4 (HH) 0.7 - 2.0 mmol/L   Renal panel    Collection Time: 02/10/24  5:52 AM   Result Value Ref Range    Sodium 132 (L) 135 - 145 mmol/L    Potassium 4.5 3.4 - 5.3 mmol/L    Chloride 93 (L) 98 - 107 mmol/L    Carbon Dioxide (CO2) 23 22 - 29 mmol/L    Anion Gap 16 (H) 7 - 15 mmol/L    Glucose 171 (H) 70 - 99 mg/dL    Urea Nitrogen 47.5 (H) 8.0 - 23.0 mg/dL    Creatinine 4.05 (H) 0.67 - 1.17 mg/dL    GFR Estimate 14 (L) >60 mL/min/1.73m2    Calcium 8.1 (L) 8.8 - 10.2 mg/dL    Albumin 2.9 (L) 3.5 - 5.2 g/dL    Phosphorus 4.9 (H) 2.5 - 4.5 mg/dL   CBC with platelets    Collection Time: 02/10/24  5:52 AM   Result Value Ref Range    WBC Count 12.7 (H) 4.0 - 11.0 10e3/uL    RBC Count 2.37 (L) 4.40 - 5.90 10e6/uL    Hemoglobin 7.7 (L) 13.3 - 17.7 g/dL    Hematocrit 22.3 (L) 40.0 - 53.0 %    MCV 94 78 - 100 fL    MCH 32.5 26.5 - 33.0 pg    MCHC 34.5 31.5 - 36.5 g/dL    RDW 13.5 10.0 - 15.0 %     Platelet Count 320 150 - 450 10e3/uL   Vancomycin level    Collection Time: 02/10/24  5:52 AM   Result Value Ref Range    Vancomycin 10.8   ug/mL   Total Protein, Serum for ELP    Collection Time: 02/10/24  5:52 AM   Result Value Ref Range    Total Protein Serum for ELP 4.5 (L) 6.4 - 8.3 g/dL   Protein Electrophoresis, Serum    Collection Time: 02/10/24  5:52 AM   Result Value Ref Range    Albumin 2.5 (L) 3.7 - 5.1 g/dL    Alpha 1 0.3 0.2 - 0.4 g/dL    Alpha 2 0.6 0.5 - 0.9 g/dL    Beta Globulin 0.5 (L) 0.6 - 1.0 g/dL    Gamma Globulin 0.6 (L) 0.7 - 1.6 g/dL    Monoclonal Peak 0.1 (H) <=0.0 g/dL    ELP Interpretation       Small monoclonal protein (0.1 g/dL) seen in the gamma fraction. See immunofixation report on same specimen. Marked hypoalbuminemia. Decreased beta fraction. Hypogammaglobulinemia. Pathologic significance requires clinical correlation. Gladys Lowe MD   Glucose by meter    Collection Time: 02/10/24  8:25 AM   Result Value Ref Range    GLUCOSE BY METER POCT 151 (H) 70 - 99 mg/dL   Glucose by meter    Collection Time: 02/10/24 12:45 PM   Result Value Ref Range    GLUCOSE BY METER POCT 189 (H) 70 - 99 mg/dL   Hepatitis B Surface Antibody    Collection Time: 02/10/24 12:49 PM   Result Value Ref Range    Hepatitis B Surface Antibody Nonreactive     Hepatitis B Surface Antibody Instrument Value <3.50 <8.5 m[IU]/mL   Hepatitis B surface antigen    Collection Time: 02/10/24 12:49 PM   Result Value Ref Range    Hepatitis B Surface Antigen Nonreactive Nonreactive   Kappa and lambda light chain    Collection Time: 02/10/24 12:49 PM   Result Value Ref Range    Kappa Free Light Chains 9.23 (H) 0.33 - 1.94 mg/dL    Lambda Free Light Chains 4.84 (H) 0.57 - 2.63 mg/dL    Kappa /Lambda Ratio 1.91 (H) 0.26 - 1.65   Protein Immunofixation Serum    Collection Time: 02/10/24 12:49 PM   Result Value Ref Range    Immunofixation ELP       Very small monoclonal IgM immunoglobulin of kappa light chain type.  Pathologic  significance requires clinical correlation. Gladys Lowe MD   Hepatitis B core antibody    Collection Time: 02/10/24 12:49 PM   Result Value Ref Range    Hepatitis B Core Antibody Total Nonreactive Nonreactive   Glucose by meter    Collection Time: 02/10/24  6:09 PM   Result Value Ref Range    GLUCOSE BY METER POCT 150 (H) 70 - 99 mg/dL   Glucose by meter    Collection Time: 02/10/24  8:54 PM   Result Value Ref Range    GLUCOSE BY METER POCT 171 (H) 70 - 99 mg/dL   CBC with platelets    Collection Time: 02/11/24  4:47 AM   Result Value Ref Range    WBC Count 11.9 (H) 4.0 - 11.0 10e3/uL    RBC Count 2.63 (L) 4.40 - 5.90 10e6/uL    Hemoglobin 8.5 (L) 13.3 - 17.7 g/dL    Hematocrit 24.8 (L) 40.0 - 53.0 %    MCV 94 78 - 100 fL    MCH 32.3 26.5 - 33.0 pg    MCHC 34.3 31.5 - 36.5 g/dL    RDW 13.6 10.0 - 15.0 %    Platelet Count 370 150 - 450 10e3/uL   Renal panel    Collection Time: 02/11/24  4:47 AM   Result Value Ref Range    Sodium 133 (L) 135 - 145 mmol/L    Potassium 4.3 3.4 - 5.3 mmol/L    Chloride 94 (L) 98 - 107 mmol/L    Carbon Dioxide (CO2) 27 22 - 29 mmol/L    Anion Gap 12 7 - 15 mmol/L    Glucose 117 (H) 70 - 99 mg/dL    Urea Nitrogen 29.7 (H) 8.0 - 23.0 mg/dL    Creatinine 3.42 (H) 0.67 - 1.17 mg/dL    GFR Estimate 18 (L) >60 mL/min/1.73m2    Calcium 8.0 (L) 8.8 - 10.2 mg/dL    Albumin 2.8 (L) 3.5 - 5.2 g/dL    Phosphorus 4.1 2.5 - 4.5 mg/dL   Uric acid    Collection Time: 02/11/24  4:47 AM   Result Value Ref Range    Uric Acid 3.9 3.4 - 7.0 mg/dL   Glucose by meter    Collection Time: 02/11/24  7:58 AM   Result Value Ref Range    GLUCOSE BY METER POCT 129 (H) 70 - 99 mg/dL   Glucose by meter    Collection Time: 02/11/24 12:52 PM   Result Value Ref Range    GLUCOSE BY METER POCT 135 (H) 70 - 99 mg/dL   Glucose by meter    Collection Time: 02/11/24  5:29 PM   Result Value Ref Range    GLUCOSE BY METER POCT 127 (H) 70 - 99 mg/dL   Glucose by meter    Collection Time: 02/11/24  8:59 PM   Result Value Ref  Range    GLUCOSE BY METER POCT 144 (H) 70 - 99 mg/dL   Glucose by meter    Collection Time: 02/12/24  2:28 AM   Result Value Ref Range    GLUCOSE BY METER POCT 120 (H) 70 - 99 mg/dL   Basic metabolic panel    Collection Time: 02/12/24  6:03 AM   Result Value Ref Range    Sodium 132 (L) 135 - 145 mmol/L    Potassium 4.4 3.4 - 5.3 mmol/L    Chloride 94 (L) 98 - 107 mmol/L    Carbon Dioxide (CO2) 26 22 - 29 mmol/L    Anion Gap 12 7 - 15 mmol/L    Urea Nitrogen 34.6 (H) 8.0 - 23.0 mg/dL    Creatinine 4.42 (H) 0.67 - 1.17 mg/dL    GFR Estimate 13 (L) >60 mL/min/1.73m2    Calcium 8.3 (L) 8.8 - 10.2 mg/dL    Glucose 115 (H) 70 - 99 mg/dL   CBC with platelets    Collection Time: 02/12/24  6:03 AM   Result Value Ref Range    WBC Count 9.6 4.0 - 11.0 10e3/uL    RBC Count 2.51 (L) 4.40 - 5.90 10e6/uL    Hemoglobin 8.1 (L) 13.3 - 17.7 g/dL    Hematocrit 23.8 (L) 40.0 - 53.0 %    MCV 95 78 - 100 fL    MCH 32.3 26.5 - 33.0 pg    MCHC 34.0 31.5 - 36.5 g/dL    RDW 13.4 10.0 - 15.0 %    Platelet Count 362 150 - 450 10e3/uL   Iron and iron binding capacity    Collection Time: 02/12/24  6:03 AM   Result Value Ref Range    Iron 50 (L) 61 - 157 ug/dL    Iron Binding Capacity 158 (L) 240 - 430 ug/dL    Iron Sat Index 32 15 - 46 %   Ferritin    Collection Time: 02/12/24  6:03 AM   Result Value Ref Range    Ferritin 113 31 - 409 ng/mL   Glucose by meter    Collection Time: 02/12/24  8:31 AM   Result Value Ref Range    GLUCOSE BY METER POCT 200 (H) 70 - 99 mg/dL   Glucose by meter    Collection Time: 02/12/24 12:55 PM   Result Value Ref Range    GLUCOSE BY METER POCT 124 (H) 70 - 99 mg/dL   Glucose by meter    Collection Time: 02/12/24  2:55 PM   Result Value Ref Range    GLUCOSE BY METER POCT 187 (H) 70 - 99 mg/dL   Glucose by meter    Collection Time: 02/12/24  4:33 PM   Result Value Ref Range    GLUCOSE BY METER POCT 181 (H) 70 - 99 mg/dL   Protein  random urine    Collection Time: 02/12/24  5:30 PM   Result Value Ref Range    Total  Protein Urine mg/dL 10.8   mg/dL    Total Protein Urine mg/mg Creat 1.52 (H) 0.00 - 0.20 mg/mg Cr    Creatinine Urine mg/dL 7.1 mg/dL   Albumin Random Urine Quantitative with Creat Ratio    Collection Time: 02/12/24  5:30 PM   Result Value Ref Range    Creatinine Urine mg/dL 6.9 mg/dL    Albumin Urine mg/L 34.1 mg/L    Albumin Urine mg/g Cr 494.20 (H) 0.00 - 17.00 mg/g Cr   Glucose by meter    Collection Time: 02/12/24  6:04 PM   Result Value Ref Range    GLUCOSE BY METER POCT 147 (H) 70 - 99 mg/dL   Glucose by meter    Collection Time: 02/12/24  9:56 PM   Result Value Ref Range    GLUCOSE BY METER POCT 144 (H) 70 - 99 mg/dL   Glucose by meter    Collection Time: 02/13/24  2:05 AM   Result Value Ref Range    GLUCOSE BY METER POCT 125 (H) 70 - 99 mg/dL   Basic metabolic panel    Collection Time: 02/13/24  6:19 AM   Result Value Ref Range    Sodium 133 (L) 135 - 145 mmol/L    Potassium 4.4 3.4 - 5.3 mmol/L    Chloride 98 98 - 107 mmol/L    Carbon Dioxide (CO2) 27 22 - 29 mmol/L    Anion Gap 8 7 - 15 mmol/L    Urea Nitrogen 21.2 8.0 - 23.0 mg/dL    Creatinine 3.66 (H) 0.67 - 1.17 mg/dL    GFR Estimate 16 (L) >60 mL/min/1.73m2    Calcium 8.6 (L) 8.8 - 10.2 mg/dL    Glucose 122 (H) 70 - 99 mg/dL   Lactic acid whole blood    Collection Time: 02/13/24  6:19 AM   Result Value Ref Range    Lactic Acid 1.0 0.7 - 2.0 mmol/L   CBC with platelets    Collection Time: 02/13/24  6:19 AM   Result Value Ref Range    WBC Count 8.5 4.0 - 11.0 10e3/uL    RBC Count 2.50 (L) 4.40 - 5.90 10e6/uL    Hemoglobin 8.1 (L) 13.3 - 17.7 g/dL    Hematocrit 24.3 (L) 40.0 - 53.0 %    MCV 97 78 - 100 fL    MCH 32.4 26.5 - 33.0 pg    MCHC 33.3 31.5 - 36.5 g/dL    RDW 13.2 10.0 - 15.0 %    Platelet Count 350 150 - 450 10e3/uL   INR    Collection Time: 02/13/24  7:53 AM   Result Value Ref Range    INR 1.42 (H) 0.85 - 1.15   Adult Type and Screen    Collection Time: 02/13/24  7:53 AM   Result Value Ref Range    ABO/RH(D) AB POS     Antibody Screen  Negative Negative    SPECIMEN EXPIRATION DATE [id number]    Glucose by meter    Collection Time: 02/13/24  8:12 AM   Result Value Ref Range    GLUCOSE BY METER POCT 113 (H) 70 - 99 mg/dL   Renal Biopsy (Reference Laboratory)    Collection Time: 02/13/24 10:40 AM   Result Value Ref Range    See Scanned Result See Scanned Result    Glucose by meter    Collection Time: 02/13/24 11:44 AM   Result Value Ref Range    GLUCOSE BY METER POCT 111 (H) 70 - 99 mg/dL   Protein electrophoresis random urine    Collection Time: 02/13/24  1:52 PM   Result Value Ref Range    Albumin Urine 24.4 (H) <=0.0 %    Alpha 1 Urine 16.6 (H) <=0.0 %    Alpha 2 Urine 14.2 (H) <=0.0 %    Beta Globulin Urine 25.4 (H) <=0.0 %    Gamma Globulin Urine 19.4 (H) <=0.0 %    Monocloncal Peak Urine % 0.0 <=0.0 %    ELP Interpretation Urine       Both albumin and globulins seen. Consistent with a mixed proteinuria. Cannot rule out the presence of monoclonal free light chains. Recommend urine immunofixation if clinically indicated.We recommend a first morning voided urine to detect clinically significant proteinuria. A random specimen is not optimal for detecting all proteins. The specific gravity of this specimen was only 1.010. Pathologic significance requires clinical correlation. Aiyana Pino MD   Glucose by meter    Collection Time: 02/13/24  4:38 PM   Result Value Ref Range    GLUCOSE BY METER POCT 118 (H) 70 - 99 mg/dL   Glucose by meter    Collection Time: 02/13/24 10:39 PM   Result Value Ref Range    GLUCOSE BY METER POCT 135 (H) 70 - 99 mg/dL   Albumin level    Collection Time: 02/14/24 12:03 AM   Result Value Ref Range    Albumin 2.9 (L) 3.5 - 5.2 g/dL   Glucose by meter    Collection Time: 02/14/24  2:40 AM   Result Value Ref Range    GLUCOSE BY METER POCT 107 (H) 70 - 99 mg/dL   Basic metabolic panel    Collection Time: 02/14/24  7:21 AM   Result Value Ref Range    Sodium 134 (L) 135 - 145 mmol/L    Potassium 4.0 3.4 - 5.3 mmol/L     Chloride 97 (L) 98 - 107 mmol/L    Carbon Dioxide (CO2) 24 22 - 29 mmol/L    Anion Gap 13 7 - 15 mmol/L    Urea Nitrogen 26.0 (H) 8.0 - 23.0 mg/dL    Creatinine 4.54 (H) 0.67 - 1.17 mg/dL    GFR Estimate 13 (L) >60 mL/min/1.73m2    Calcium 8.6 (L) 8.8 - 10.2 mg/dL    Glucose 115 (H) 70 - 99 mg/dL   CBC with platelets    Collection Time: 02/14/24  7:21 AM   Result Value Ref Range    WBC Count 9.7 4.0 - 11.0 10e3/uL    RBC Count 2.64 (L) 4.40 - 5.90 10e6/uL    Hemoglobin 8.5 (L) 13.3 - 17.7 g/dL    Hematocrit 25.2 (L) 40.0 - 53.0 %    MCV 96 78 - 100 fL    MCH 32.2 26.5 - 33.0 pg    MCHC 33.7 31.5 - 36.5 g/dL    RDW 13.3 10.0 - 15.0 %    Platelet Count 359 150 - 450 10e3/uL   Glucose by meter    Collection Time: 02/14/24  9:31 AM   Result Value Ref Range    GLUCOSE BY METER POCT 131 (H) 70 - 99 mg/dL   Glucose by meter    Collection Time: 02/14/24  9:54 AM   Result Value Ref Range    GLUCOSE BY METER POCT 136 (H) 70 - 99 mg/dL   Glucose by meter    Collection Time: 02/14/24  4:09 PM   Result Value Ref Range    GLUCOSE BY METER POCT 122 (H) 70 - 99 mg/dL   Basic metabolic panel    Collection Time: 02/16/24  7:17 AM   Result Value Ref Range    Sodium 136 135 - 145 mmol/L    Potassium 3.4 3.4 - 5.3 mmol/L    Chloride 100 98 - 107 mmol/L    Carbon Dioxide (CO2) 26 22 - 29 mmol/L    Anion Gap 10 7 - 15 mmol/L    Urea Nitrogen 12.1 8.0 - 23.0 mg/dL    Creatinine 3.22 (H) 0.67 - 1.17 mg/dL    GFR Estimate 19 (L) >60 mL/min/1.73m2    Calcium 8.5 (L) 8.8 - 10.2 mg/dL    Glucose 108 (H) 70 - 99 mg/dL   CBC with platelets    Collection Time: 02/16/24  7:17 AM   Result Value Ref Range    WBC Count 8.7 4.0 - 11.0 10e3/uL    RBC Count 2.18 (L) 4.40 - 5.90 10e6/uL    Hemoglobin 7.1 (L) 13.3 - 17.7 g/dL    Hematocrit 21.5 (L) 40.0 - 53.0 %    MCV 99 78 - 100 fL    MCH 32.6 26.5 - 33.0 pg    MCHC 33.0 31.5 - 36.5 g/dL    RDW 13.3 10.0 - 15.0 %    Platelet Count 332 150 - 450 10e3/uL        MEDICATIONS:     Review of your medicines             Accurate as of February 16, 2024 11:59 PM. If you have any questions, ask your nurse or doctor.                CONTINUE these medicines which have NOT CHANGED        Dose / Directions   acetaminophen 325 MG tablet  Commonly known as: TYLENOL      Dose: 650 mg  [ACETAMINOPHEN (TYLENOL) 325 MG TABLET] Take 650 mg by mouth every 4 (four) hours as needed for pain.  Refills: 0     amLODIPine 2.5 MG tablet  Commonly known as: NORVASC  Used for: Accelerated hypertension      Dose: 5 mg  Take 2 tablets (5 mg) by mouth daily  Quantity: 60 tablet  Refills: 0     aspirin 81 MG EC tablet  Commonly known as: ASA      Dose: 81 mg  [ASPIRIN 81 MG EC TABLET] Take 81 mg by mouth daily.  Refills: 0     ferrous sulfate 325 (65 Fe) MG tablet  Commonly known as: FEROSUL  Used for: ROXIE (acute kidney injury) (H24)      Dose: 325 mg  Take 1 tablet (325 mg) by mouth every other day  Quantity: 30 tablet  Refills: 0     LANsoprazole 30 MG DR capsule  Commonly known as: PREVACID      Dose: 30 mg  Take 30 mg by mouth at bedtime  Refills: 0     levofloxacin 250 MG tablet  Commonly known as: LEVAQUIN  Indication: Urinary Tract Infection  Used for: Acute cystitis without hematuria      Dose: 250 mg  Take 1 tablet (250 mg) by mouth every 48 hours Take on 2/15/24.  Quantity: 1 tablet  Refills: 0     polyethylene glycol 17 GM/Dose powder  Commonly known as: MIRALAX  Used for: Other constipation      Dose: 17 g  Take 17 g by mouth daily as needed for constipation  Quantity: 510 g  Refills: 0     PreserVision AREDS 2 Caps      Dose: 1 capsule  [VIT C,W-NB-GELFP-LUTEIN-ZEAXAN (PRESERVISION AREDS-2) 250-90-40-1 MG CAP] Take 1 capsule by mouth 2 (two) times a day.  Refills: 0     simvastatin 20 MG tablet  Commonly known as: ZOCOR  Used for: Hyperlipidemia, unspecified hyperlipidemia type      Dose: 20 mg  Take 1 tablet (20 mg) by mouth at bedtime  Quantity: 90 tablet  Refills: 0              ASSESSMENT/PLAN  Encounter Diagnoses   Name Primary?     Physical deconditioning Yes    Dialysis patient (H24)        Elevated creatinine and potassium with a low sodium, recently sent to the ER current labs sodium 136, potassium 3.4 and creatinine down to 3.22    Physical deconditioning PT OT    Falls will continue with therapy for strengthening    Pain management as needed Tylenol, gabapentin 900 mg twice daily,     Hypertension on Norvasc 2.5 mg daily, recently discontinued hydrochlorothiazide and valsartan due to elevated creatinine and potassium    diabetes type 2 he was on metformin however it was discontinued related to elevated creatinine fingersticks twice daily     GERD on Prevacid HDL on simvastatin    Electronically signed by: Geneva Jaramillo, CNP

## 2024-02-17 ENCOUNTER — LAB REQUISITION (OUTPATIENT)
Dept: LAB | Facility: CLINIC | Age: 79
End: 2024-02-17
Payer: COMMERCIAL

## 2024-02-17 DIAGNOSIS — D64.9 ANEMIA, UNSPECIFIED: ICD-10-CM

## 2024-02-18 ENCOUNTER — HOSPITAL ENCOUNTER (EMERGENCY)
Facility: CLINIC | Age: 79
Discharge: HOME | End: 2024-02-18
Attending: EMERGENCY MEDICINE | Admitting: EMERGENCY MEDICINE
Payer: COMMERCIAL

## 2024-02-18 VITALS
HEART RATE: 66 BPM | OXYGEN SATURATION: 98 % | SYSTOLIC BLOOD PRESSURE: 151 MMHG | WEIGHT: 170 LBS | BODY MASS INDEX: 25.1 KG/M2 | RESPIRATION RATE: 20 BRPM | DIASTOLIC BLOOD PRESSURE: 69 MMHG | TEMPERATURE: 98.6 F

## 2024-02-18 DIAGNOSIS — D64.9 ANEMIA, UNSPECIFIED TYPE: ICD-10-CM

## 2024-02-18 LAB
ABO/RH(D): NORMAL
ALBUMIN SERPL BCG-MCNC: 3.4 G/DL (ref 3.5–5.2)
ALP SERPL-CCNC: 101 U/L (ref 40–150)
ALT SERPL W P-5'-P-CCNC: 27 U/L (ref 0–70)
ANION GAP SERPL CALCULATED.3IONS-SCNC: 12 MMOL/L (ref 7–15)
ANTIBODY SCREEN: NEGATIVE
APTT PPP: 32 SECONDS (ref 22–38)
AST SERPL W P-5'-P-CCNC: 83 U/L (ref 0–45)
BASOPHILS # BLD AUTO: 0 10E3/UL (ref 0–0.2)
BASOPHILS NFR BLD AUTO: 0 %
BILIRUB SERPL-MCNC: 0.3 MG/DL
BUN SERPL-MCNC: 18.4 MG/DL (ref 8–23)
CALCIUM SERPL-MCNC: 8.5 MG/DL (ref 8.8–10.2)
CHLORIDE SERPL-SCNC: 101 MMOL/L (ref 98–107)
CREAT SERPL-MCNC: 3.74 MG/DL (ref 0.67–1.17)
DEPRECATED HCO3 PLAS-SCNC: 27 MMOL/L (ref 22–29)
EGFRCR SERPLBLD CKD-EPI 2021: 16 ML/MIN/1.73M2
EOSINOPHIL # BLD AUTO: 0.1 10E3/UL (ref 0–0.7)
EOSINOPHIL NFR BLD AUTO: 1 %
ERYTHROCYTE [DISTWIDTH] IN BLOOD BY AUTOMATED COUNT: 13.7 % (ref 10–15)
GLUCOSE SERPL-MCNC: 223 MG/DL (ref 70–99)
HCT VFR BLD AUTO: 23.8 % (ref 40–53)
HEMOCCULT STL QL: NEGATIVE
HGB BLD-MCNC: 6.8 G/DL (ref 13.3–17.7)
HGB BLD-MCNC: 7.7 G/DL (ref 13.3–17.7)
IMM GRANULOCYTES # BLD: 0.1 10E3/UL
IMM GRANULOCYTES NFR BLD: 1 %
INR PPP: 1.25 (ref 0.85–1.15)
LYMPHOCYTES # BLD AUTO: 0.9 10E3/UL (ref 0.8–5.3)
LYMPHOCYTES NFR BLD AUTO: 9 %
MCH RBC QN AUTO: 32.2 PG (ref 26.5–33)
MCHC RBC AUTO-ENTMCNC: 32.4 G/DL (ref 31.5–36.5)
MCV RBC AUTO: 100 FL (ref 78–100)
MONOCYTES # BLD AUTO: 0.8 10E3/UL (ref 0–1.3)
MONOCYTES NFR BLD AUTO: 8 %
NEUTROPHILS # BLD AUTO: 8.1 10E3/UL (ref 1.6–8.3)
NEUTROPHILS NFR BLD AUTO: 81 %
NRBC # BLD AUTO: 0 10E3/UL
NRBC BLD AUTO-RTO: 0 /100
PLATELET # BLD AUTO: 335 10E3/UL (ref 150–450)
POTASSIUM SERPL-SCNC: 3.8 MMOL/L (ref 3.4–5.3)
PROT SERPL-MCNC: 5.8 G/DL (ref 6.4–8.3)
RBC # BLD AUTO: 2.39 10E6/UL (ref 4.4–5.9)
SODIUM SERPL-SCNC: 140 MMOL/L (ref 135–145)
SPECIMEN EXPIRATION DATE: NORMAL
WBC # BLD AUTO: 9.9 10E3/UL (ref 4–11)

## 2024-02-18 PROCEDURE — 93005 ELECTROCARDIOGRAM TRACING: CPT | Performed by: EMERGENCY MEDICINE

## 2024-02-18 PROCEDURE — 82040 ASSAY OF SERUM ALBUMIN: CPT | Performed by: EMERGENCY MEDICINE

## 2024-02-18 PROCEDURE — 99284 EMERGENCY DEPT VISIT MOD MDM: CPT

## 2024-02-18 PROCEDURE — 85018 HEMOGLOBIN: CPT | Performed by: EMERGENCY MEDICINE

## 2024-02-18 PROCEDURE — 36415 COLL VENOUS BLD VENIPUNCTURE: CPT | Performed by: EMERGENCY MEDICINE

## 2024-02-18 PROCEDURE — 36415 COLL VENOUS BLD VENIPUNCTURE: CPT | Mod: ORL | Performed by: NURSE PRACTITIONER

## 2024-02-18 PROCEDURE — 85025 COMPLETE CBC W/AUTO DIFF WBC: CPT | Mod: ORL | Performed by: NURSE PRACTITIONER

## 2024-02-18 PROCEDURE — P9603 ONE-WAY ALLOW PRORATED MILES: HCPCS | Mod: ORL | Performed by: NURSE PRACTITIONER

## 2024-02-18 PROCEDURE — 86900 BLOOD TYPING SEROLOGIC ABO: CPT | Performed by: EMERGENCY MEDICINE

## 2024-02-18 PROCEDURE — 82272 OCCULT BLD FECES 1-3 TESTS: CPT | Performed by: EMERGENCY MEDICINE

## 2024-02-18 PROCEDURE — 85610 PROTHROMBIN TIME: CPT | Performed by: EMERGENCY MEDICINE

## 2024-02-18 PROCEDURE — 85730 THROMBOPLASTIN TIME PARTIAL: CPT | Performed by: EMERGENCY MEDICINE

## 2024-02-18 ASSESSMENT — ACTIVITIES OF DAILY LIVING (ADL): ADLS_ACUITY_SCORE: 38

## 2024-02-18 NOTE — ED NOTES
Pt did not want to wait two hours so called his son who was not out of town yet, pt waiting for son in waiting room.

## 2024-02-18 NOTE — DISCHARGE INSTRUCTIONS
Hemoglobin here today is 7.7.  This is likely related to your chronic anemia and no signs of any new bleeding  Your low value this morning was likely a lab error  Follow up closely with your doctor  Return if any acute worsening of symptoms

## 2024-02-18 NOTE — ED TRIAGE NOTES
Pt arrives to ED via EMS from [facility] where he is there while recovering from some weakness and a flutter. Pt had his blood drawn there and facility reports a hemoglobin of 6.7. dialysis pt Tuesday Thursday Saturday schedule. Did have it yesterday. Edema noted to bilateral feet and lower legs.      Triage Assessment (Adult)       Row Name 02/18/24 2063          Triage Assessment    Airway WDL WDL        Respiratory WDL    Respiratory WDL WDL        Skin Circulation/Temperature WDL    Skin Circulation/Temperature WDL WDL        Cardiac WDL    Cardiac WDL WDL        Peripheral/Neurovascular WDL    Peripheral Neurovascular WDL WDL        Cognitive/Neuro/Behavioral WDL    Cognitive/Neuro/Behavioral WDL WDL

## 2024-02-18 NOTE — ED PROVIDER NOTES
EMERGENCY DEPARTMENT ENCOUNTER      NAME: Pa García  AGE: 78 year old male  YOB: 1945  MRN: [id number]  EVALUATION DATE & TIME: 2/18/2024  9:22 AM    PCP: Lars Rajan    ED PROVIDER: Eulalia Sim DO      Chief Complaint   Patient presents with    Abnormal Labs         FINAL IMPRESSION:  1. Anemia, unspecified type          ED COURSE & MEDICAL DECISION MAKING:    Pertinent Labs & Imaging studies reviewed. (See chart for details)  9:36 AM I met the patient and performed my initial interview and exam.  10:04 AM Rechecked patient. Performed a rectal exam and there was no gross blood noted.  10:26 AM Rechecked and updated patient on lab results.    78 year old male presents to the Emergency Department for evaluation of low hemoglobin.  Patient recently admitted secondary to acute on chronic kidney failure requiring dialysis.  Patient has macrocytic anemia.  Last dialysis was yesterday.  He has no complaints.  No dizziness, no syncope, denies any signs of bleeding.  He is not on any anticoagulation.  Of note, patient did have atrial flutter during his last admission.  It was discussed to start him on anticoagulation but patient declined in the setting of history of recurrent falls.  His EKG does show atrial fibrillation here today, rate controlled.  He is normotensive.  2 days ago hemoglobin was 7.1, 1 day ago 6.8.  Normally is around 8.  Patient nontoxic-appearing here.  Repeat hemoglobin here is 7.7.  Creatinine is elevated in the setting of his kidney disease.  No anion gap acidosis.  Potassium is 3.8.  No significant coagulopathy.  Rectal exam with light brown stool and Hemoccult negative.  Given patient asymptomatic and hemoglobin around his baseline, I feel he can be safely discharged with further monitoring by his primary physician.  Return if any acute worsening symptoms, dizziness, syncope, signs of bleeding or any other concerns.    At the conclusion of the encounter I discussed the  results of all of the tests and the disposition. The questions were answered. The patient or family acknowledged understanding and was agreeable with the care plan.     Medical Decision Making  Obtained supplemental history:Supplemental history obtained?: Documented in chart  Reviewed external records: External records reviewed?: Inpatient Record: ED admission to [facility] for acute renal insufficiency on 2/9/2024 - 2/14/2024 and lab work drawn at  laboratory on 2/18/24  Care impacted by chronic illness:Chronic Kidney Disease, Diabetes, Hyperlipidemia, and Hypertension  Care significantly affected by social determinants of health:N/A  Did you consider but not order tests?: Work up considered but not performed and documented in chart, if applicable  Did you interpret images independently?: Independent interpretation of ECG and images noted in documentation, when applicable.  Consultation discussion with other provider:Did you involve another provider (consultant, , pharmacy, etc.)?: No  Discharge. No recommendations on prescription strength medication(s). See documentation for any additional details.      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    Per chart review, the patient presented to [facility] for acute renal insufficiency on 2/9/2024 -  2/14/2024. Patient was admitted for acute renal failure with hyperkalemia and high anion gap metabolic acidosis. S/p HD on 2/9/24, 2/10/24, 2/12/24, 2/14/24. Hgb was 10.4 on admission and 8.5 on 2/14, but no signes of active bleeding. Iron studies was notable for ferritin 113, TIBC 158, iron 50. BS was 83 and Na was 127 (improved to 132 after dialysis) at admission. Restarted on amlodipine but was increased to 5 mg on discharge to treat HTN. Echo at 2/2/24 showed normal LV function without wall motion abnormalities. Metformin was held and he was on a sliding scale insulin while hospitalized, though blood sugars was well-controlled. Deferred to outpatient provider to further consider/discuss diabetes management long-term and outpatient EP cardiology f/up. Patient was discharge to TCU for reconditioning. Plan to f/up with Nephrology for Monday/Wednesday/Friday dialysis and repeat chest CT in 3 months to ensure resolution of nodules.    Per chart review: Patient's HGB was drawn at [facility] laboratory and patient's HGB was 6.8 at 0628 on 2/18.    Patient reports he presents to the ER because his HGB was found to be low. He notes feeling fine and not feeling lightheaded or dizzy currently. He notes no signs of bleeding that he is aware of. He had his most recent round of dialysis yesterday (2/17). No prior history of requiring a blood transfusion. No fevers. He is not on blood thinners. Mentions he had back surgery 5-6 years ago with complications, which resulted in a punctured area in his spine, and now has neurogenic bladder and bowel and wears a AFO foot brace on his right foot. No other reported complaints or concerns at this time.      REVIEW OF SYSTEMS   Per HPI    PAST MEDICAL HISTORY:  No past medical history on file.    PAST SURGICAL HISTORY:  Past Surgical History:   Procedure Laterality Date    BACK SURGERY      lumbar laminectomy and fusion/decompression    LUMBAR LAMINECTOMY Bilateral 3/8/2016    Procedure:  REVISION DECOMPRESSION BILATERAL L2-3;  Surgeon: Alexis Amado MD;  Location: [facility] - [place], [place];  Service:     LUMBAR LAMINECTOMY  3/13/2016    Procedure: Posterior Laminectomy L1-L3 with Dural Repair, and Microscopic Discectomy;  Surgeon: Foster Rae MD;  Location: [facility] - [place], [place];  Service:            CURRENT MEDICATIONS:    acetaminophen (TYLENOL) 325 MG tablet  amLODIPine (NORVASC) 2.5 MG tablet  aspirin 81 MG EC tablet  ferrous sulfate (FEROSUL) 325 (65 Fe) MG tablet  lansoprazole (PREVACID) 30 MG capsule  levofloxacin (LEVAQUIN) 250 MG tablet  polyethylene glycol (MIRALAX) 17 GM/Dose powder  simvastatin (ZOCOR) 20 MG tablet  vit C,I-Nt-ostso-lutein-zeaxan (PRESERVISION AREDS-2) 250-90-40-1 mg cap         ALLERGIES:  Allergies   Allergen Reactions    Ezetimibe-Simvastatin GI Disturbance    Metformin      Possible ALIYA 2/24       FAMILY HISTORY:  Family History   Problem Relation Age of Onset    Dementia Father     Cancer Maternal Grandmother     Cancer Maternal Grandfather     Cancer Paternal Grandmother     Cancer Paternal Grandfather        SOCIAL HISTORY:   Social History     Socioeconomic History    Marital status: Single   Tobacco Use    Smoking status: Never    Smokeless tobacco: Never   Substance and Sexual Activity    Alcohol use: Yes     Comment: Alcoholic Drinks/day: occ    Drug use: No       VITALS:  /58   Pulse 67   Temp 98.6  F (37  C) (Oral)   Resp 22   Wt 77.1 kg (170 lb)   SpO2 97%   BMI 25.10 kg/m      PHYSICAL EXAM    Physical Exam  Constitutional:       General: He is not in acute distress.  HENT:      Head: Normocephalic and atraumatic.      Mouth/Throat:      Pharynx: Oropharynx is clear.   Eyes:      Pupils: Pupils are equal, round, and reactive to light.   Neck:      Comments: Dialysis catheter placed in his right neck  Cardiovascular:      Rate and Rhythm: Normal rate and regular rhythm.      Pulses: Normal pulses.      Heart sounds: Normal heart sounds.    Pulmonary:      Effort: Pulmonary effort is normal.      Breath sounds: Normal breath sounds.   Abdominal:      General: Abdomen is flat. Bowel sounds are normal.      Palpations: Abdomen is soft.      Tenderness: There is no abdominal tenderness.   Musculoskeletal:         General: Normal range of motion.      Right lower leg: Edema present.      Left lower leg: Edema present.   Skin:     General: Skin is warm and dry.      Capillary Refill: Capillary refill takes less than 2 seconds.      Coloration: Skin is pale.   Neurological:      General: No focal deficit present.      Mental Status: He is alert and oriented to person, place, and time.             LAB:  All pertinent labs reviewed and interpreted.  Labs Ordered and Resulted from Time of ED Arrival to Time of ED Departure   INR - Abnormal       Result Value    INR 1.25 (*)    COMPREHENSIVE METABOLIC PANEL - Abnormal    Sodium 140      Potassium 3.8      Carbon Dioxide (CO2) 27      Anion Gap 12      Urea Nitrogen 18.4      Creatinine 3.74 (*)     GFR Estimate 16 (*)     Calcium 8.5 (*)     Chloride 101      Glucose 223 (*)     Alkaline Phosphatase 101      AST 83 (*)     ALT 27      Protein Total 5.8 (*)     Albumin 3.4 (*)     Bilirubin Total 0.3     CBC WITH PLATELETS AND DIFFERENTIAL - Abnormal    WBC Count 9.9      RBC Count 2.39 (*)     Hemoglobin 7.7 (*)     Hematocrit 23.8 (*)           MCH 32.2      MCHC 32.4      RDW 13.7      Platelet Count 335      % Neutrophils 81      % Lymphocytes 9      % Monocytes 8      % Eosinophils 1      % Basophils 0      % Immature Granulocytes 1      NRBCs per 100 WBC 0      Absolute Neutrophils 8.1      Absolute Lymphocytes 0.9      Absolute Monocytes 0.8      Absolute Eosinophils 0.1      Absolute Basophils 0.0      Absolute Immature Granulocytes 0.1      Absolute NRBCs 0.0     PARTIAL THROMBOPLASTIN TIME - Normal    aPTT 32     OCCULT BLOOD STOOL - Normal    Occult Blood Negative     OCCULT BLOOD STOOL POCT    TYPE AND SCREEN, ADULT    ABO/RH(D) AB POS      SPECIMEN EXPIRATION DATE [id number]     ABO/RH TYPE AND SCREEN       RADIOLOGY:  Reviewed all pertinent imaging. Please see official radiology report.  No orders to display       EKG:    Performed at: 2/18/2024 09:30:25    Impression: Atrial fibrillation. Incomplete right bundle branch block. Abnormal ECG.    Rate: 82 BPM  Rhythm: Atrial fibrillation  Axis: 0  DC Interval: *  QRS Interval: 94 ms  QTc Interval: 425 ms  ST Changes: No ST changes.  Comparison: When compared with ECG of 2/9/2024 09:10, atrial fibrillation has replaced sinus rhythm. Incomplete right bundle branch block is now present. Criteria for septal infarct are no longer present.    I have independently reviewed and interpreted the EKG(s) documented above.      I, Nora Heller, am serving as a scribe to document services personally performed by Dr. Eulalia Sim based on my observation and the provider's statements to me. IEulalia, DO attest that Nora Heller is acting in a scribe capacity, has observed my performance of the services and has documented them in accordance with my direction.    Eulalia Sim DO  Emergency Medicine  [facility] EMERGENCY ROOM  [address] [facility] [address] [facility] [address] [phone]  Dept: [phone]       Eulalia Sim DO  02/18/24 1038

## 2024-02-18 NOTE — ED NOTES
Called [facility] and gave the update to the nurse. Nurse okay with plan to send pt back. Pt also okay with plan. Pt will be taking m [facility] wheelchair transport back to [facility].

## 2024-02-19 LAB
ATRIAL RATE - MUSE: 277 BPM
DIASTOLIC BLOOD PRESSURE - MUSE: NORMAL MMHG
INTERPRETATION ECG - MUSE: NORMAL
P AXIS - MUSE: NORMAL DEGREES
PR INTERVAL - MUSE: NORMAL MS
QRS DURATION - MUSE: 94 MS
QT - MUSE: 364 MS
QTC - MUSE: 425 MS
R AXIS - MUSE: 0 DEGREES
SYSTOLIC BLOOD PRESSURE - MUSE: NORMAL MMHG
T AXIS - MUSE: 27 DEGREES
VENTRICULAR RATE- MUSE: 82 BPM

## 2024-02-20 ENCOUNTER — TRANSITIONAL CARE UNIT VISIT (OUTPATIENT)
Dept: GERIATRICS | Facility: CLINIC | Age: 79
End: 2024-02-20
Payer: COMMERCIAL

## 2024-02-20 VITALS
OXYGEN SATURATION: 92 % | RESPIRATION RATE: 20 BRPM | TEMPERATURE: 97.5 F | HEART RATE: 73 BPM | BODY MASS INDEX: 25.53 KG/M2 | WEIGHT: 172.4 LBS | HEIGHT: 69 IN | DIASTOLIC BLOOD PRESSURE: 72 MMHG | SYSTOLIC BLOOD PRESSURE: 140 MMHG

## 2024-02-20 DIAGNOSIS — Z99.2 DIALYSIS PATIENT (H): Primary | ICD-10-CM

## 2024-02-20 DIAGNOSIS — R53.81 PHYSICAL DECONDITIONING: ICD-10-CM

## 2024-02-20 DIAGNOSIS — D64.9 HEMOGLOBIN LOW: ICD-10-CM

## 2024-02-20 PROCEDURE — 99309 SBSQ NF CARE MODERATE MDM 30: CPT | Performed by: NURSE PRACTITIONER

## 2024-02-20 NOTE — LETTER
2/20/2024        RE: Pa García  [address] [facility] [address]        Reviewed wherever he goes and does a photo she will [facility]  Chief Complaint   Patient presents with     RECHECK     Fabricio Medical Record Number:  [id number]  Place of Service where encounter took place:  [facility] ([facility] ([facility]) [[id number]]  Code Status: unknown     HISTORY:      HPI:  Pa García  is 78 year old (1945) undergoing physical and occupational therapy. He is with past medical history hypertension, chronic bilateral hip pain, diabetes type 2, GERD, HDL,Excerpted from records  He presented  for progressively worsening generalized weakness and mechanical fall.  No LOS, did not hit head.  No associated symptoms.  No chest pain or palpitations. Presented afebrile, vitally stable, satting well on RA.  He had mild elevation of troponin that down trended on repeat otherwise CBC, CMP Pro-Julio, lactic acid, UA, chest x-ray, UA, flu/RSV/COVID were unremarkable.  Blood cultures have been no growth to date.  He was also noted to have atrial flutter with an elevated troponin per records the flutter appears to be new.  He was asymptomatic, cardiology was consulted and he had an echo that had normal LV function without wall motion abnormalities.  He was recommended he be a candidate for anticoagulation however patient refused due to history of recurrent falls.  He plans to follow-up outpatient cardiology for possible electrophysiology consult  He was recovering in the TCU and was sent to the ER for elevated creatinine  on 2/9/24 and returned to the TCU on 2/14/24 Excerpted from records   He admitted on 2/9/2024 for acute kidney injury in the setting of suspected metformin associated lactic acidosis.  He was admitted again  for acute renal failure with hyperkalemia and high anion gap metabolic acidosis. S/p HD on 2/9/24, 2/10/24, 2/12/24, 2/14/24. S/p renal biopsy, pathology read is pending. Plan is for HD  following a TTS schedule for 3 months.       Today he was seen at the bedside to review vital signs, labs, and posthospitalization.  Recently noted to have hyperkalemia was given Kayexalate.  Repeat labs worsening.  Creatinine went from 2.54 to 4.08.  Continue to be hyperkalemic at 5.7 with a sodium of 131.  He was also with nausea and vomiting and was sent to the ER at that time.  He was also with a HGB 6.8 however he was 7.7 when he reached the hospital and was sent back to the TCU. Please see above.  He denied chest pain shortness of breath cough, Denied constipation or diarrhea.  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  He was on metformin which was recently discontinued due to elevated creatinine.   He does have chronic pain bilateral hips left more pronounced than the right.  Per hospital records he did receive bilateral hip injections in the hospital.  Per his report he ambulates with a cane and a four-wheel walker for longer distances.  His weights were reviewed which showed a significant weight gain in 1 week of approximately 20 pounds. He was placed on daily weights. His lung sounds were clear throughout all fields he was noted to have 1-2+ lower extremity edema and compression stockings were ordered.  He completed Levaquin on 2/15/2024 for a UTI.      ALLERGIES:Ezetimibe-simvastatin and Metformin    PAST MEDICAL HISTORY: No past medical history on file.    PAST SURGICAL HISTORY:   has a past surgical history that includes back surgery; Lumbar Laminectomy (Bilateral, 3/8/2016); and Lumbar Laminectomy (3/13/2016).    FAMILY HISTORY: family history includes Cancer in his maternal grandfather, maternal grandmother, paternal grandfather, and paternal grandmother; Dementia in his father.    SOCIAL HISTORY:  reports that he has never smoked. He has never used smokeless tobacco. He reports current alcohol use. He reports that he does not use drugs.    ROS:  Constitutional: Negative for activity  "change, appetite change, fatigue and fever. HX Falls  HENT: Negative for congestion.  Right neck port currently undergoing dialysis Tuesday Thursday Saturday  Respiratory: Negative for cough, shortness of breath and wheezing.    Cardiovascular: Negative for chest pain and positive   leg swelling.   Gastrointestinal: Negative for abdominal distention, abdominal pain, constipation, diarrhea   Patient genitourinary: Negative for dysuria.   Musculoskeletal: Positive  for arthralgia. Negative for back pain. Bilateral hips   Skin: Negative for color change and wound. Per records stage 1 coccyx   Neurological: Negative for dizziness.   Psychiatric/Behavioral: Negative for agitation, behavioral problems and confusion.     Physical Exam:  Constitutional:       Appearance: Patient is well-developed.   HENT:      Head: Normocephalic.   Eyes:      Conjunctiva/sclera: Conjunctivae normal.   Neck:      Musculoskeletal: Normal range of motion.   Cardiovascular:      Rate and Rhythm: Normal rate and regular rhythm.      Heart sounds: Normal heart sounds. No murmur.   Pulmonary:      Effort: No respiratory distress.      Breath sounds: Normal breath sounds. No wheezing or rales.   Abdominal:      General: Bowel sounds are normal. There is no distension.      Palpations: Abdomen is soft.      Tenderness: There is no abdominal tenderness.   Musculoskeletal:       Normal range of motion.   1-2+ lower extremity edema   Skin:General:        Skin is warm.   Neurological:         Mental Status: Patient is alert and oriented to person, place, and time.   Psychiatric:         Behavior: Behavior normal.     Vitals:BP (!) 140/72   Pulse 73   Temp 97.5  F (36.4  C)   Resp 20   Ht 1.753 m (5' 9\")   Wt 78.2 kg (172 lb 6.4 oz)   SpO2 92%   BMI 25.46 kg/m   and Body mass index is 25.46 kg/m .    Lab/Diagnostic data:   Recent Results (from the past 240 hour(s))   Glucose by meter    Collection Time: 02/11/24  8:59 PM   Result Value Ref Range "    GLUCOSE BY METER POCT 144 (H) 70 - 99 mg/dL   Glucose by meter    Collection Time: 02/12/24  2:28 AM   Result Value Ref Range    GLUCOSE BY METER POCT 120 (H) 70 - 99 mg/dL   Basic metabolic panel    Collection Time: 02/12/24  6:03 AM   Result Value Ref Range    Sodium 132 (L) 135 - 145 mmol/L    Potassium 4.4 3.4 - 5.3 mmol/L    Chloride 94 (L) 98 - 107 mmol/L    Carbon Dioxide (CO2) 26 22 - 29 mmol/L    Anion Gap 12 7 - 15 mmol/L    Urea Nitrogen 34.6 (H) 8.0 - 23.0 mg/dL    Creatinine 4.42 (H) 0.67 - 1.17 mg/dL    GFR Estimate 13 (L) >60 mL/min/1.73m2    Calcium 8.3 (L) 8.8 - 10.2 mg/dL    Glucose 115 (H) 70 - 99 mg/dL   CBC with platelets    Collection Time: 02/12/24  6:03 AM   Result Value Ref Range    WBC Count 9.6 4.0 - 11.0 10e3/uL    RBC Count 2.51 (L) 4.40 - 5.90 10e6/uL    Hemoglobin 8.1 (L) 13.3 - 17.7 g/dL    Hematocrit 23.8 (L) 40.0 - 53.0 %    MCV 95 78 - 100 fL    MCH 32.3 26.5 - 33.0 pg    MCHC 34.0 31.5 - 36.5 g/dL    RDW 13.4 10.0 - 15.0 %    Platelet Count 362 150 - 450 10e3/uL   Iron and iron binding capacity    Collection Time: 02/12/24  6:03 AM   Result Value Ref Range    Iron 50 (L) 61 - 157 ug/dL    Iron Binding Capacity 158 (L) 240 - 430 ug/dL    Iron Sat Index 32 15 - 46 %   Ferritin    Collection Time: 02/12/24  6:03 AM   Result Value Ref Range    Ferritin 113 31 - 409 ng/mL   Glucose by meter    Collection Time: 02/12/24  8:31 AM   Result Value Ref Range    GLUCOSE BY METER POCT 200 (H) 70 - 99 mg/dL   Glucose by meter    Collection Time: 02/12/24 12:55 PM   Result Value Ref Range    GLUCOSE BY METER POCT 124 (H) 70 - 99 mg/dL   Glucose by meter    Collection Time: 02/12/24  2:55 PM   Result Value Ref Range    GLUCOSE BY METER POCT 187 (H) 70 - 99 mg/dL   Glucose by meter    Collection Time: 02/12/24  4:33 PM   Result Value Ref Range    GLUCOSE BY METER POCT 181 (H) 70 - 99 mg/dL   Protein  random urine    Collection Time: 02/12/24  5:30 PM   Result Value Ref Range    Total Protein  Urine mg/dL 10.8   mg/dL    Total Protein Urine mg/mg Creat 1.52 (H) 0.00 - 0.20 mg/mg Cr    Creatinine Urine mg/dL 7.1 mg/dL   Albumin Random Urine Quantitative with Creat Ratio    Collection Time: 02/12/24  5:30 PM   Result Value Ref Range    Creatinine Urine mg/dL 6.9 mg/dL    Albumin Urine mg/L 34.1 mg/L    Albumin Urine mg/g Cr 494.20 (H) 0.00 - 17.00 mg/g Cr   Glucose by meter    Collection Time: 02/12/24  6:04 PM   Result Value Ref Range    GLUCOSE BY METER POCT 147 (H) 70 - 99 mg/dL   Glucose by meter    Collection Time: 02/12/24  9:56 PM   Result Value Ref Range    GLUCOSE BY METER POCT 144 (H) 70 - 99 mg/dL   Glucose by meter    Collection Time: 02/13/24  2:05 AM   Result Value Ref Range    GLUCOSE BY METER POCT 125 (H) 70 - 99 mg/dL   Basic metabolic panel    Collection Time: 02/13/24  6:19 AM   Result Value Ref Range    Sodium 133 (L) 135 - 145 mmol/L    Potassium 4.4 3.4 - 5.3 mmol/L    Chloride 98 98 - 107 mmol/L    Carbon Dioxide (CO2) 27 22 - 29 mmol/L    Anion Gap 8 7 - 15 mmol/L    Urea Nitrogen 21.2 8.0 - 23.0 mg/dL    Creatinine 3.66 (H) 0.67 - 1.17 mg/dL    GFR Estimate 16 (L) >60 mL/min/1.73m2    Calcium 8.6 (L) 8.8 - 10.2 mg/dL    Glucose 122 (H) 70 - 99 mg/dL   Lactic acid whole blood    Collection Time: 02/13/24  6:19 AM   Result Value Ref Range    Lactic Acid 1.0 0.7 - 2.0 mmol/L   CBC with platelets    Collection Time: 02/13/24  6:19 AM   Result Value Ref Range    WBC Count 8.5 4.0 - 11.0 10e3/uL    RBC Count 2.50 (L) 4.40 - 5.90 10e6/uL    Hemoglobin 8.1 (L) 13.3 - 17.7 g/dL    Hematocrit 24.3 (L) 40.0 - 53.0 %    MCV 97 78 - 100 fL    MCH 32.4 26.5 - 33.0 pg    MCHC 33.3 31.5 - 36.5 g/dL    RDW 13.2 10.0 - 15.0 %    Platelet Count 350 150 - 450 10e3/uL   INR    Collection Time: 02/13/24  7:53 AM   Result Value Ref Range    INR 1.42 (H) 0.85 - 1.15   Adult Type and Screen    Collection Time: 02/13/24  7:53 AM   Result Value Ref Range    ABO/RH(D) AB POS     Antibody Screen Negative  Negative    SPECIMEN EXPIRATION DATE [id number]    Glucose by meter    Collection Time: 02/13/24  8:12 AM   Result Value Ref Range    GLUCOSE BY METER POCT 113 (H) 70 - 99 mg/dL   Renal Biopsy (Reference Laboratory)    Collection Time: 02/13/24 10:40 AM   Result Value Ref Range    See Scanned Result See Scanned Result    Glucose by meter    Collection Time: 02/13/24 11:44 AM   Result Value Ref Range    GLUCOSE BY METER POCT 111 (H) 70 - 99 mg/dL   Protein electrophoresis random urine    Collection Time: 02/13/24  1:52 PM   Result Value Ref Range    Albumin Urine 24.4 (H) <=0.0 %    Alpha 1 Urine 16.6 (H) <=0.0 %    Alpha 2 Urine 14.2 (H) <=0.0 %    Beta Globulin Urine 25.4 (H) <=0.0 %    Gamma Globulin Urine 19.4 (H) <=0.0 %    Monocloncal Peak Urine % 0.0 <=0.0 %    ELP Interpretation Urine       Both albumin and globulins seen. Consistent with a mixed proteinuria. Cannot rule out the presence of monoclonal free light chains. Recommend urine immunofixation if clinically indicated.We recommend a first morning voided urine to detect clinically significant proteinuria. A random specimen is not optimal for detecting all proteins. The specific gravity of this specimen was only 1.010. Pathologic significance requires clinical correlation. Aiyana Pino MD   Glucose by meter    Collection Time: 02/13/24  4:38 PM   Result Value Ref Range    GLUCOSE BY METER POCT 118 (H) 70 - 99 mg/dL   Glucose by meter    Collection Time: 02/13/24 10:39 PM   Result Value Ref Range    GLUCOSE BY METER POCT 135 (H) 70 - 99 mg/dL   Albumin level    Collection Time: 02/14/24 12:03 AM   Result Value Ref Range    Albumin 2.9 (L) 3.5 - 5.2 g/dL   Glucose by meter    Collection Time: 02/14/24  2:40 AM   Result Value Ref Range    GLUCOSE BY METER POCT 107 (H) 70 - 99 mg/dL   Basic metabolic panel    Collection Time: 02/14/24  7:21 AM   Result Value Ref Range    Sodium 134 (L) 135 - 145 mmol/L    Potassium 4.0 3.4 - 5.3 mmol/L    Chloride 97  (L) 98 - 107 mmol/L    Carbon Dioxide (CO2) 24 22 - 29 mmol/L    Anion Gap 13 7 - 15 mmol/L    Urea Nitrogen 26.0 (H) 8.0 - 23.0 mg/dL    Creatinine 4.54 (H) 0.67 - 1.17 mg/dL    GFR Estimate 13 (L) >60 mL/min/1.73m2    Calcium 8.6 (L) 8.8 - 10.2 mg/dL    Glucose 115 (H) 70 - 99 mg/dL   CBC with platelets    Collection Time: 02/14/24  7:21 AM   Result Value Ref Range    WBC Count 9.7 4.0 - 11.0 10e3/uL    RBC Count 2.64 (L) 4.40 - 5.90 10e6/uL    Hemoglobin 8.5 (L) 13.3 - 17.7 g/dL    Hematocrit 25.2 (L) 40.0 - 53.0 %    MCV 96 78 - 100 fL    MCH 32.2 26.5 - 33.0 pg    MCHC 33.7 31.5 - 36.5 g/dL    RDW 13.3 10.0 - 15.0 %    Platelet Count 359 150 - 450 10e3/uL   Glucose by meter    Collection Time: 02/14/24  9:31 AM   Result Value Ref Range    GLUCOSE BY METER POCT 131 (H) 70 - 99 mg/dL   Glucose by meter    Collection Time: 02/14/24  9:54 AM   Result Value Ref Range    GLUCOSE BY METER POCT 136 (H) 70 - 99 mg/dL   Glucose by meter    Collection Time: 02/14/24  4:09 PM   Result Value Ref Range    GLUCOSE BY METER POCT 122 (H) 70 - 99 mg/dL   Basic metabolic panel    Collection Time: 02/16/24  7:17 AM   Result Value Ref Range    Sodium 136 135 - 145 mmol/L    Potassium 3.4 3.4 - 5.3 mmol/L    Chloride 100 98 - 107 mmol/L    Carbon Dioxide (CO2) 26 22 - 29 mmol/L    Anion Gap 10 7 - 15 mmol/L    Urea Nitrogen 12.1 8.0 - 23.0 mg/dL    Creatinine 3.22 (H) 0.67 - 1.17 mg/dL    GFR Estimate 19 (L) >60 mL/min/1.73m2    Calcium 8.5 (L) 8.8 - 10.2 mg/dL    Glucose 108 (H) 70 - 99 mg/dL   CBC with platelets    Collection Time: 02/16/24  7:17 AM   Result Value Ref Range    WBC Count 8.7 4.0 - 11.0 10e3/uL    RBC Count 2.18 (L) 4.40 - 5.90 10e6/uL    Hemoglobin 7.1 (L) 13.3 - 17.7 g/dL    Hematocrit 21.5 (L) 40.0 - 53.0 %    MCV 99 78 - 100 fL    MCH 32.6 26.5 - 33.0 pg    MCHC 33.0 31.5 - 36.5 g/dL    RDW 13.3 10.0 - 15.0 %    Platelet Count 332 150 - 450 10e3/uL   Hemoglobin    Collection Time: 02/18/24  6:28 AM   Result  Value Ref Range    Hemoglobin 6.8 (LL) 13.3 - 17.7 g/dL   ECG 12-LEAD WITH MUSE (LHE)    Collection Time: 02/18/24  9:30 AM   Result Value Ref Range    Systolic Blood Pressure  mmHg    Diastolic Blood Pressure  mmHg    Ventricular Rate 82 BPM    Atrial Rate 277 BPM    AZ Interval  ms    QRS Duration 94 ms     ms    QTc 425 ms    P Axis  degrees    R AXIS 0 degrees    T Axis 27 degrees    Interpretation ECG       Atrial fibrillation  Incomplete right bundle branch block  Abnormal ECG  When compared with ECG of 09-FEB-2024 09:10,  Atrial fibrillation has replaced Sinus rhythm  Incomplete right bundle branch block is now Present  Criteria for Septal infarct are no longer Present  Confirmed by SEE ED PROVIDER NOTE FOR, ECG INTERPRETATION ([id number]),  MCKENNA EDOUARD ([id number]) on 2/19/2024 9:17:01 AM     INR    Collection Time: 02/18/24  9:52 AM   Result Value Ref Range    INR 1.25 (H) 0.85 - 1.15   Partial thromboplastin time    Collection Time: 02/18/24  9:52 AM   Result Value Ref Range    aPTT 32 22 - 38 Seconds   Comprehensive metabolic panel    Collection Time: 02/18/24  9:52 AM   Result Value Ref Range    Sodium 140 135 - 145 mmol/L    Potassium 3.8 3.4 - 5.3 mmol/L    Carbon Dioxide (CO2) 27 22 - 29 mmol/L    Anion Gap 12 7 - 15 mmol/L    Urea Nitrogen 18.4 8.0 - 23.0 mg/dL    Creatinine 3.74 (H) 0.67 - 1.17 mg/dL    GFR Estimate 16 (L) >60 mL/min/1.73m2    Calcium 8.5 (L) 8.8 - 10.2 mg/dL    Chloride 101 98 - 107 mmol/L    Glucose 223 (H) 70 - 99 mg/dL    Alkaline Phosphatase 101 40 - 150 U/L    AST 83 (H) 0 - 45 U/L    ALT 27 0 - 70 U/L    Protein Total 5.8 (L) 6.4 - 8.3 g/dL    Albumin 3.4 (L) 3.5 - 5.2 g/dL    Bilirubin Total 0.3 <=1.2 mg/dL   CBC with platelets and differential    Collection Time: 02/18/24  9:52 AM   Result Value Ref Range    WBC Count 9.9 4.0 - 11.0 10e3/uL    RBC Count 2.39 (L) 4.40 - 5.90 10e6/uL    Hemoglobin 7.7 (L) 13.3 - 17.7 g/dL    Hematocrit 23.8 (L) 40.0 - 53.0 %     78  - 100 fL    MCH 32.2 26.5 - 33.0 pg    MCHC 32.4 31.5 - 36.5 g/dL    RDW 13.7 10.0 - 15.0 %    Platelet Count 335 150 - 450 10e3/uL    % Neutrophils 81 %    % Lymphocytes 9 %    % Monocytes 8 %    % Eosinophils 1 %    % Basophils 0 %    % Immature Granulocytes 1 %    NRBCs per 100 WBC 0 <1 /100    Absolute Neutrophils 8.1 1.6 - 8.3 10e3/uL    Absolute Lymphocytes 0.9 0.8 - 5.3 10e3/uL    Absolute Monocytes 0.8 0.0 - 1.3 10e3/uL    Absolute Eosinophils 0.1 0.0 - 0.7 10e3/uL    Absolute Basophils 0.0 0.0 - 0.2 10e3/uL    Absolute Immature Granulocytes 0.1 <=0.4 10e3/uL    Absolute NRBCs 0.0 10e3/uL   Adult Type and Screen    Collection Time: 02/18/24  9:52 AM   Result Value Ref Range    ABO/RH(D) AB POS     Antibody Screen Negative Negative    SPECIMEN EXPIRATION DATE [id number]    Occult blood stool    Collection Time: 02/18/24 10:13 AM   Result Value Ref Range    Occult Blood Negative Negative        MEDICATIONS:     Review of your medicines            Accurate as of February 20, 2024 11:59 PM. If you have any questions, ask your nurse or doctor.                CONTINUE these medicines which have NOT CHANGED        Dose / Directions   acetaminophen 325 MG tablet  Commonly known as: TYLENOL      Dose: 650 mg  [ACETAMINOPHEN (TYLENOL) 325 MG TABLET] Take 650 mg by mouth every 4 (four) hours as needed for pain.  Refills: 0     amLODIPine 2.5 MG tablet  Commonly known as: NORVASC  Used for: Accelerated hypertension      Dose: 5 mg  Take 2 tablets (5 mg) by mouth daily  Quantity: 60 tablet  Refills: 0     aspirin 81 MG EC tablet  Commonly known as: ASA      Dose: 81 mg  [ASPIRIN 81 MG EC TABLET] Take 81 mg by mouth daily.  Refills: 0     ferrous sulfate 325 (65 Fe) MG tablet  Commonly known as: FEROSUL  Used for: ROXIE (acute kidney injury) (H24)      Dose: 325 mg  Take 1 tablet (325 mg) by mouth every other day  Quantity: 30 tablet  Refills: 0     LANsoprazole 30 MG DR capsule  Commonly known as: PREVACID      Dose:  30 mg  Take 30 mg by mouth at bedtime  Refills: 0     polyethylene glycol 17 GM/Dose powder  Commonly known as: MIRALAX  Used for: Other constipation      Dose: 17 g  Take 17 g by mouth daily as needed for constipation  Quantity: 510 g  Refills: 0     PreserVision AREDS 2 Caps      Dose: 1 capsule  [VIT C,W-MK-EPLIU-LUTEIN-ZEAXAN (PRESERVISION AREDS-2) 250-90-40-1 MG CAP] Take 1 capsule by mouth 2 (two) times a day.  Refills: 0     simvastatin 20 MG tablet  Commonly known as: ZOCOR  Used for: Hyperlipidemia, unspecified hyperlipidemia type      Dose: 20 mg  Take 1 tablet (20 mg) by mouth at bedtime  Quantity: 90 tablet  Refills: 0            STOP taking      levofloxacin 250 MG tablet  Commonly known as: LEVAQUIN  Stopped by: Geneva Jaramillo CNP                 ASSESSMENT/PLAN  Encounter Diagnoses   Name Primary?     Dialysis patient (H24) Yes     Hemoglobin low      Physical deconditioning      Elevated creatinine and potassium with a low sodium, recently sent to the ER current labs sodium 136, potassium 3.4 and creatinine down to 3.22    Physical deconditioning PT OT    Falls will continue with therapy for strengthening    Pain management as needed Tylenol, gabapentin 900 mg twice daily,     Hypertension on Norvasc 2.5 mg daily, recently discontinued hydrochlorothiazide and valsartan due to elevated creatinine and potassium    diabetes type 2 he was on metformin however it was discontinued related to elevated creatinine fingersticks twice daily     GERD on Prevacid HDL on simvastatin    Anemia- HGB low at 6.8, sent to ER and hospital HGB 7.7. He was sent back to the TCU    Electronically signed by: Geneva Jaramillo CNP       Sincerely,        Geneva Jaramillo CNP

## 2024-02-21 ENCOUNTER — DOCUMENTATION ONLY (OUTPATIENT)
Dept: OTHER | Facility: CLINIC | Age: 79
End: 2024-02-21
Payer: COMMERCIAL

## 2024-02-25 ENCOUNTER — TELEPHONE (OUTPATIENT)
Dept: GERIATRICS | Facility: CLINIC | Age: 79
End: 2024-02-25
Payer: COMMERCIAL

## 2024-02-25 NOTE — TELEPHONE ENCOUNTER
[facility]   2024     Name: Pa García   : 1945     Background:  Staff reports they have been watching patient's weight. Had dialysis yesterday and weight was 169.6 lbs. Today, is up 2 lbs. Right arm and lower extremities swollen. No pain. Tends to have weight go down on dialysis days and up on non-dialysis days. Next dialysis day Tuesday. Dialysis catheter in right neck. No reports of shortness of breath.    Seen in ED 24 and weight 170 lbs.    Orders:  Ultrasound of RUE to r/o DVT    Electronically signed by XAVIER Monsalve CNP

## 2024-02-27 ENCOUNTER — LAB REQUISITION (OUTPATIENT)
Dept: LAB | Facility: CLINIC | Age: 79
End: 2024-02-27
Payer: COMMERCIAL

## 2024-02-27 ENCOUNTER — TRANSITIONAL CARE UNIT VISIT (OUTPATIENT)
Dept: GERIATRICS | Facility: CLINIC | Age: 79
End: 2024-02-27
Payer: COMMERCIAL

## 2024-02-27 VITALS
HEIGHT: 69 IN | WEIGHT: 169.8 LBS | RESPIRATION RATE: 20 BRPM | OXYGEN SATURATION: 92 % | TEMPERATURE: 97.7 F | SYSTOLIC BLOOD PRESSURE: 141 MMHG | HEART RATE: 69 BPM | DIASTOLIC BLOOD PRESSURE: 58 MMHG | BODY MASS INDEX: 25.15 KG/M2

## 2024-02-27 DIAGNOSIS — R60.9 EDEMA, UNSPECIFIED TYPE: ICD-10-CM

## 2024-02-27 DIAGNOSIS — Z99.2 DIALYSIS PATIENT (H): Primary | ICD-10-CM

## 2024-02-27 DIAGNOSIS — R53.81 PHYSICAL DECONDITIONING: ICD-10-CM

## 2024-02-27 DIAGNOSIS — R35.0 FREQUENCY OF MICTURITION: ICD-10-CM

## 2024-02-27 DIAGNOSIS — R52 PAIN MANAGEMENT: ICD-10-CM

## 2024-02-27 LAB
ALBUMIN UR-MCNC: 30 MG/DL
APPEARANCE UR: CLEAR
BILIRUB UR QL STRIP: NEGATIVE
COLOR UR AUTO: ABNORMAL
GLUCOSE UR STRIP-MCNC: 150 MG/DL
HGB UR QL STRIP: NEGATIVE
HYALINE CASTS: 1 /LPF
KETONES UR STRIP-MCNC: NEGATIVE MG/DL
LEUKOCYTE ESTERASE UR QL STRIP: NEGATIVE
MUCOUS THREADS #/AREA URNS LPF: PRESENT /LPF
NITRATE UR QL: NEGATIVE
PH UR STRIP: 7 [PH] (ref 5–7)
RBC URINE: 1 /HPF
SP GR UR STRIP: 1.01 (ref 1–1.03)
SQUAMOUS EPITHELIAL: <1 /HPF
UROBILINOGEN UR STRIP-MCNC: NORMAL MG/DL
WBC URINE: 5 /HPF

## 2024-02-27 PROCEDURE — 87086 URINE CULTURE/COLONY COUNT: CPT | Mod: ORL | Performed by: NURSE PRACTITIONER

## 2024-02-27 PROCEDURE — 81001 URINALYSIS AUTO W/SCOPE: CPT | Mod: ORL | Performed by: NURSE PRACTITIONER

## 2024-02-27 PROCEDURE — 99316 NF DSCHRG MGMT 30 MIN+: CPT | Performed by: NURSE PRACTITIONER

## 2024-02-27 NOTE — LETTER
2/27/2024        RE: Pa García  [address] [facility] [address]        Reviewed wherever he goes and does a photo she will [facility]  Chief Complaint   Patient presents with     RECHECK     [place] Medical Record Number:  [id number]  Place of Service where encounter took place:  [facility] ([facility]) [[id number]]  Code Status: unknown     HISTORY:      HPI:  Pa García  is 78 year old (1945) undergoing physical and occupational therapy. He is with past medical history hypertension, chronic bilateral hip pain, diabetes type 2, GERD, HDL,Excerpted from records  He presented  for progressively worsening generalized weakness and mechanical fall.  No LOS, did not hit head.  No associated symptoms.  No chest pain or palpitations. Presented afebrile, vitally stable, satting well on RA.  He had mild elevation of troponin that down trended on repeat otherwise CBC, CMP Pro-Julio, lactic acid, UA, chest x-ray, UA, flu/RSV/COVID were unremarkable.  Blood cultures have been no growth to date.  He was also noted to have atrial flutter with an elevated troponin per records the flutter appears to be new.  He was asymptomatic, cardiology was consulted and he had an echo that had normal LV function without wall motion abnormalities.  He was recommended he be a candidate for anticoagulation however patient refused due to history of recurrent falls.  He plans to follow-up outpatient cardiology for possible electrophysiology consult  He was recovering in the TCU and was sent to the ER for elevated creatinine  on 2/9/24 and returned to the TCU on 2/14/24 Excerpted from records   He admitted on 2/9/2024 for acute kidney injury in the setting of suspected metformin associated lactic acidosis.  He was admitted again  for acute renal failure with hyperkalemia and high anion gap metabolic acidosis. S/p HD on 2/9/24, 2/10/24, 2/12/24, 2/14/24. S/p renal biopsy, pathology read is pending. Plan is for HD  following a TTS schedule for 3 months.       Today he was seen at the bedside after returning from dialysis to review swelling right hand. He was with 2+ edema right hand, can wiggle fingers, and positive 3+ radial pulse. Venous US negative for DVT but did show a superficial clot. Writer spoke with therapy and an Isotoner glove to be provided for compression.  He denied chest pain shortness of breath cough, Denied constipation or diarrhea.  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  He is no longer on Metformin due to kidney injury.   He does have chronic pain bilateral hips left more pronounced than the right.  Per hospital records he did receive bilateral hip injections in the hospital.  Per his report he ambulates with a cane and a four-wheel walker for longer distances. He completed Levaquin on 2/15/2024 for a UTI.  He tells writer that labs were drawn today at dialysis     ALLERGIES:Ezetimibe-simvastatin and Metformin    PAST MEDICAL HISTORY: No past medical history on file.    PAST SURGICAL HISTORY:   has a past surgical history that includes back surgery; Lumbar Laminectomy (Bilateral, 3/8/2016); and Lumbar Laminectomy (3/13/2016).    FAMILY HISTORY: family history includes Cancer in his maternal grandfather, maternal grandmother, paternal grandfather, and paternal grandmother; Dementia in his father.    SOCIAL HISTORY:  reports that he has never smoked. He has never used smokeless tobacco. He reports current alcohol use. He reports that he does not use drugs.    ROS:  Constitutional: Negative for activity change, appetite change, fatigue and fever. HX Falls  HENT: Negative for congestion.  Right neck port currently undergoing dialysis Tuesday Thursday Saturday  Respiratory: Negative for cough, shortness of breath and wheezing.    Cardiovascular: Negative for chest pain and positive   leg swelling.   Gastrointestinal: Negative for abdominal distention, abdominal pain, constipation, diarrhea  "  Patient genitourinary: Negative for dysuria.   Musculoskeletal: Positive  for arthralgia. Negative for back pain. Bilateral hips   Skin: Negative for color change and wound. Per records stage 1 coccyx   Neurological: Negative for dizziness.   Psychiatric/Behavioral: Negative for agitation, behavioral problems and confusion.     Physical Exam:  Constitutional:       Appearance: Patient is well-developed.   HENT:      Head: Normocephalic.   Eyes:      Conjunctiva/sclera: Conjunctivae normal.   Neck:      Musculoskeletal: Normal range of motion.   Cardiovascular:      Rate and Rhythm: Normal rate and regular rhythm.      Heart sounds: Normal heart sounds. No murmur.   Pulmonary:      Effort: No respiratory distress.      Breath sounds: Normal breath sounds. No wheezing or rales.   Abdominal:      General: Bowel sounds are normal. There is no distension.      Palpations: Abdomen is soft.      Tenderness: There is no abdominal tenderness.   Musculoskeletal:       Normal range of motion.   1-2+ lower extremity edema 2+ right hand   Skin:General:        Skin is warm.   Neurological:         Mental Status: Patient is alert and oriented to person, place, and time.   Psychiatric:         Behavior: Behavior normal.     Vitals:BP (!) 141/58   Pulse 69   Temp 97.7  F (36.5  C)   Resp 20   Ht 1.753 m (5' 9\")   Wt 77 kg (169 lb 12.8 oz)   SpO2 92%   BMI 25.08 kg/m   and Body mass index is 25.08 kg/m .    Lab/Diagnostic data:   Recent Results (from the past 240 hour(s))   Urine Macroscopic with reflex to Microscopic    Collection Time: 02/27/24  7:25 PM   Result Value Ref Range    Color Urine Light Yellow Colorless, Straw, Light Yellow, Yellow    Appearance Urine Clear Clear    Glucose Urine 150 (A) Negative mg/dL    Bilirubin Urine Negative Negative    Ketones Urine Negative Negative mg/dL    Specific Gravity Urine 1.011 1.003 - 1.035    Blood Urine Negative Negative    pH Urine 7.0 5.0 - 7.0    Protein Albumin Urine 30 " (A) Negative mg/dL    Urobilinogen Urine Normal Normal, 2.0 mg/dL    Nitrite Urine Negative Negative    Leukocyte Esterase Urine Negative Negative    RBC Urine 1 <=2 /HPF    WBC Urine 5 <=5 /HPF    Squamous Epithelials Urine <1 <=1 /HPF    Mucus Urine Present (A) None Seen /LPF    Hyaline Casts Urine 1 <=2 /LPF        MEDICATIONS:     Review of your medicines            Accurate as of February 27, 2024 11:59 PM. If you have any questions, ask your nurse or doctor.                CONTINUE these medicines which have NOT CHANGED        Dose / Directions   acetaminophen 325 MG tablet  Commonly known as: TYLENOL      Dose: 650 mg  [ACETAMINOPHEN (TYLENOL) 325 MG TABLET] Take 650 mg by mouth every 4 (four) hours as needed for pain.  Refills: 0     amLODIPine 2.5 MG tablet  Commonly known as: NORVASC  Used for: Accelerated hypertension      Dose: 5 mg  Take 2 tablets (5 mg) by mouth daily  Quantity: 60 tablet  Refills: 0     aspirin 81 MG EC tablet  Commonly known as: ASA      Dose: 81 mg  [ASPIRIN 81 MG EC TABLET] Take 81 mg by mouth daily.  Refills: 0     ferrous sulfate 325 (65 Fe) MG tablet  Commonly known as: FEROSUL  Used for: ROXIE (acute kidney injury) (H24)      Dose: 325 mg  Take 1 tablet (325 mg) by mouth every other day  Quantity: 30 tablet  Refills: 0     LANsoprazole 30 MG DR capsule  Commonly known as: PREVACID      Dose: 30 mg  Take 30 mg by mouth at bedtime  Refills: 0     polyethylene glycol 17 GM/Dose powder  Commonly known as: MIRALAX  Used for: Other constipation      Dose: 17 g  Take 17 g by mouth daily as needed for constipation  Quantity: 510 g  Refills: 0     PreserVision AREDS 2 Caps      Dose: 1 capsule  [VIT C,Z-PC-YHCHA-LUTEIN-ZEAXAN (PRESERVISION AREDS-2) 250-90-40-1 MG CAP] Take 1 capsule by mouth 2 (two) times a day.  Refills: 0     simvastatin 20 MG tablet  Commonly known as: ZOCOR  Used for: Hyperlipidemia, unspecified hyperlipidemia type      Dose: 20 mg  Take 1 tablet (20 mg) by mouth at  bedtime  Quantity: 90 tablet  Refills: 0              ASSESSMENT/PLAN  Encounter Diagnoses   Name Primary?     Dialysis patient (H24) Yes     Physical deconditioning      Pain management      Edema, unspecified type      Dialysis patient Tuesday, Thursday/Saturday     Physical deconditioning PT OT    Falls will continue with therapy for strengthening    Pain management as needed Tylenol, gabapentin 900 mg twice daily,     Hypertension on Norvasc 2.5 mg daily, recently discontinued hydrochlorothiazide and valsartan due to elevated creatinine and potassium    diabetes type 2 he was on metformin however it was discontinued related to elevated creatinine fingersticks twice daily     GERD on Prevacid HDL on simvastatin    Anemia- HGB 7.7     Electronically signed by: Geneva Jaramillo CNP           Sincerely,        Gneeva Jaramillo CNP

## 2024-02-27 NOTE — PROGRESS NOTES
Reviewed wherever he goes and does a photo she will [facility]  Chief Complaint   Patient presents with    [facility] Record Number:  [id number]  Place of Service where encounter took place:  [facility] ([facility]) [[id number]]  Code Status: unknown     HISTORY:      HPI:  Pa García  is 78 year old (1945) undergoing physical and occupational therapy. He is with past medical history hypertension, chronic bilateral hip pain, diabetes type 2, GERD, HDL,Excerpted from records  He presented  for progressively worsening generalized weakness and mechanical fall.  No LOS, did not hit head.  No associated symptoms.  No chest pain or palpitations. Presented afebrile, vitally stable, satting well on RA.  He had mild elevation of troponin that down trended on repeat otherwise CBC, CMP Pro-Julio, lactic acid, UA, chest x-ray, UA, flu/RSV/COVID were unremarkable.  Blood cultures have been no growth to date.  He was also noted to have atrial flutter with an elevated troponin per records the flutter appears to be new.  He was asymptomatic, cardiology was consulted and he had an echo that had normal LV function without wall motion abnormalities.  He was recommended he be a candidate for anticoagulation however patient refused due to history of recurrent falls.  He plans to follow-up outpatient cardiology for possible electrophysiology consult  He was recovering in the TCU and was sent to the ER for elevated creatinine  on 2/9/24 and returned to the TCU on 2/14/24 Excerpted from records   He admitted on 2/9/2024 for acute kidney injury in the setting of suspected metformin associated lactic acidosis.  He was admitted again  for acute renal failure with hyperkalemia and high anion gap metabolic acidosis. S/p HD on 2/9/24, 2/10/24, 2/12/24, 2/14/24. S/p renal biopsy, pathology read is pending. Plan is for HD following a TTS schedule for 3 months.       Today he was seen at the bedside after  returning from dialysis to review swelling right hand. He was with 2+ edema right hand, can wiggle fingers, and positive 3+ radial pulse. Venous US negative for DVT but did show a superficial clot. Writer spoke with therapy and an Isotoner glove to be provided for compression.  He denied chest pain shortness of breath cough, Denied constipation or diarrhea.  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  He is no longer on Metformin due to kidney injury.   He does have chronic pain bilateral hips left more pronounced than the right.  Per hospital records he did receive bilateral hip injections in the hospital.  Per his report he ambulates with a cane and a four-wheel walker for longer distances. He completed Levaquin on 2/15/2024 for a UTI.  He tells writer that labs were drawn today at dialysis     ALLERGIES:Ezetimibe-simvastatin and Metformin    PAST MEDICAL HISTORY: No past medical history on file.    PAST SURGICAL HISTORY:   has a past surgical history that includes back surgery; Lumbar Laminectomy (Bilateral, 3/8/2016); and Lumbar Laminectomy (3/13/2016).    FAMILY HISTORY: family history includes Cancer in his maternal grandfather, maternal grandmother, paternal grandfather, and paternal grandmother; Dementia in his father.    SOCIAL HISTORY:  reports that he has never smoked. He has never used smokeless tobacco. He reports current alcohol use. He reports that he does not use drugs.    ROS:  Constitutional: Negative for activity change, appetite change, fatigue and fever. HX Falls  HENT: Negative for congestion.  Right neck port currently undergoing dialysis Tuesday Thursday Saturday  Respiratory: Negative for cough, shortness of breath and wheezing.    Cardiovascular: Negative for chest pain and positive   leg swelling.   Gastrointestinal: Negative for abdominal distention, abdominal pain, constipation, diarrhea   Patient genitourinary: Negative for dysuria.   Musculoskeletal: Positive  for  "arthralgia. Negative for back pain. Bilateral hips   Skin: Negative for color change and wound. Per records stage 1 coccyx   Neurological: Negative for dizziness.   Psychiatric/Behavioral: Negative for agitation, behavioral problems and confusion.     Physical Exam:  Constitutional:       Appearance: Patient is well-developed.   HENT:      Head: Normocephalic.   Eyes:      Conjunctiva/sclera: Conjunctivae normal.   Neck:      Musculoskeletal: Normal range of motion.   Cardiovascular:      Rate and Rhythm: Normal rate and regular rhythm.      Heart sounds: Normal heart sounds. No murmur.   Pulmonary:      Effort: No respiratory distress.      Breath sounds: Normal breath sounds. No wheezing or rales.   Abdominal:      General: Bowel sounds are normal. There is no distension.      Palpations: Abdomen is soft.      Tenderness: There is no abdominal tenderness.   Musculoskeletal:       Normal range of motion.   1-2+ lower extremity edema 2+ right hand   Skin:General:        Skin is warm.   Neurological:         Mental Status: Patient is alert and oriented to person, place, and time.   Psychiatric:         Behavior: Behavior normal.     Vitals:BP (!) 141/58   Pulse 69   Temp 97.7  F (36.5  C)   Resp 20   Ht 1.753 m (5' 9\")   Wt 77 kg (169 lb 12.8 oz)   SpO2 92%   BMI 25.08 kg/m   and Body mass index is 25.08 kg/m .    Lab/Diagnostic data:   Recent Results (from the past 240 hour(s))   Urine Macroscopic with reflex to Microscopic    Collection Time: 02/27/24  7:25 PM   Result Value Ref Range    Color Urine Light Yellow Colorless, Straw, Light Yellow, Yellow    Appearance Urine Clear Clear    Glucose Urine 150 (A) Negative mg/dL    Bilirubin Urine Negative Negative    Ketones Urine Negative Negative mg/dL    Specific Gravity Urine 1.011 1.003 - 1.035    Blood Urine Negative Negative    pH Urine 7.0 5.0 - 7.0    Protein Albumin Urine 30 (A) Negative mg/dL    Urobilinogen Urine Normal Normal, 2.0 mg/dL    Nitrite " Urine Negative Negative    Leukocyte Esterase Urine Negative Negative    RBC Urine 1 <=2 /HPF    WBC Urine 5 <=5 /HPF    Squamous Epithelials Urine <1 <=1 /HPF    Mucus Urine Present (A) None Seen /LPF    Hyaline Casts Urine 1 <=2 /LPF        MEDICATIONS:     Review of your medicines            Accurate as of February 27, 2024 11:59 PM. If you have any questions, ask your nurse or doctor.                CONTINUE these medicines which have NOT CHANGED        Dose / Directions   acetaminophen 325 MG tablet  Commonly known as: TYLENOL      Dose: 650 mg  [ACETAMINOPHEN (TYLENOL) 325 MG TABLET] Take 650 mg by mouth every 4 (four) hours as needed for pain.  Refills: 0     amLODIPine 2.5 MG tablet  Commonly known as: NORVASC  Used for: Accelerated hypertension      Dose: 5 mg  Take 2 tablets (5 mg) by mouth daily  Quantity: 60 tablet  Refills: 0     aspirin 81 MG EC tablet  Commonly known as: ASA      Dose: 81 mg  [ASPIRIN 81 MG EC TABLET] Take 81 mg by mouth daily.  Refills: 0     ferrous sulfate 325 (65 Fe) MG tablet  Commonly known as: FEROSUL  Used for: ROXIE (acute kidney injury) (H24)      Dose: 325 mg  Take 1 tablet (325 mg) by mouth every other day  Quantity: 30 tablet  Refills: 0     LANsoprazole 30 MG DR capsule  Commonly known as: PREVACID      Dose: 30 mg  Take 30 mg by mouth at bedtime  Refills: 0     polyethylene glycol 17 GM/Dose powder  Commonly known as: MIRALAX  Used for: Other constipation      Dose: 17 g  Take 17 g by mouth daily as needed for constipation  Quantity: 510 g  Refills: 0     PreserVision AREDS 2 Caps      Dose: 1 capsule  [VIT C,U-VC-KAQSA-LUTEIN-ZEAXAN (PRESERVISION AREDS-2) 250-90-40-1 MG CAP] Take 1 capsule by mouth 2 (two) times a day.  Refills: 0     simvastatin 20 MG tablet  Commonly known as: ZOCOR  Used for: Hyperlipidemia, unspecified hyperlipidemia type      Dose: 20 mg  Take 1 tablet (20 mg) by mouth at bedtime  Quantity: 90 tablet  Refills: 0               ASSESSMENT/PLAN  Encounter Diagnoses   Name Primary?    Dialysis patient (H24) Yes    Physical deconditioning     Pain management     Edema, unspecified type      Dialysis patient Tuesday, Thursday/Saturday     Physical deconditioning PT OT    Falls will continue with therapy for strengthening    Pain management as needed Tylenol, gabapentin 900 mg twice daily,     Hypertension on Norvasc 2.5 mg daily, recently discontinued hydrochlorothiazide and valsartan due to elevated creatinine and potassium    diabetes type 2 he was on metformin however it was discontinued related to elevated creatinine fingersticks twice daily     GERD on Prevacid HDL on simvastatin    Anemia- HGB 7.7     Electronically signed by: Geneva Jaramillo, CNP

## 2024-02-28 PROBLEM — R53.81 PHYSICAL DECONDITIONING: Status: ACTIVE | Noted: 2024-02-28

## 2024-02-29 ENCOUNTER — DISCHARGE SUMMARY NURSING HOME (OUTPATIENT)
Dept: GERIATRICS | Facility: CLINIC | Age: 79
End: 2024-02-29
Payer: COMMERCIAL

## 2024-02-29 VITALS
OXYGEN SATURATION: 96 % | HEIGHT: 69 IN | SYSTOLIC BLOOD PRESSURE: 139 MMHG | HEART RATE: 85 BPM | WEIGHT: 168.6 LBS | RESPIRATION RATE: 20 BRPM | DIASTOLIC BLOOD PRESSURE: 50 MMHG | TEMPERATURE: 97.8 F | BODY MASS INDEX: 24.97 KG/M2

## 2024-02-29 DIAGNOSIS — R52 PAIN MANAGEMENT: ICD-10-CM

## 2024-02-29 DIAGNOSIS — R53.81 PHYSICAL DECONDITIONING: ICD-10-CM

## 2024-02-29 DIAGNOSIS — Z99.2 DIALYSIS PATIENT (H): Primary | ICD-10-CM

## 2024-02-29 LAB — BACTERIA UR CULT: NO GROWTH

## 2024-02-29 PROCEDURE — 99316 NF DSCHRG MGMT 30 MIN+: CPT | Performed by: NURSE PRACTITIONER

## 2024-02-29 NOTE — LETTER
2/29/2024        RE: Pa García  [address] [facility] [address]        Reviewed wherever he goes and does a photo she will [facility]  Chief Complaint   Patient presents with     Discharge Summary Nursing Home     [place] Medical Record Number:  [id number]  Place of Service where encounter took place:  [facility] ([facility]) [[id number]]  Code Status: unknown     HISTORY:      HPI:  Pa García  is 78 year old (1945) undergoing physical and occupational therapy. He is with past medical history hypertension, chronic bilateral hip pain, diabetes type 2, GERD, HDL,Excerpted from records  He presented  for progressively worsening generalized weakness and mechanical fall.  No LOS, did not hit head.  No associated symptoms.  No chest pain or palpitations. Presented afebrile, vitally stable, satting well on RA.  He had mild elevation of troponin that down trended on repeat otherwise CBC, CMP Pro-Julio, lactic acid, UA, chest x-ray, UA, flu/RSV/COVID were unremarkable.  Blood cultures have been no growth to date.  He was also noted to have atrial flutter with an elevated troponin per records the flutter appears to be new.  He was asymptomatic, cardiology was consulted and he had an echo that had normal LV function without wall motion abnormalities.  He was recommended he be a candidate for anticoagulation however patient refused due to history of recurrent falls.  He plans to follow-up outpatient cardiology for possible electrophysiology consult  He was recovering in the TCU and was sent to the ER for elevated creatinine  on 2/9/24 and returned to the TCU on 2/14/24 Excerpted from records   He admitted on 2/9/2024 for acute kidney injury in the setting of suspected metformin associated lactic acidosis.  He was admitted again  for acute renal failure with hyperkalemia and high anion gap metabolic acidosis. S/p HD on 2/9/24, 2/10/24, 2/12/24, 2/14/24. S/p renal biopsy, pathology read is  pending. Plan is for HD following a TTS schedule for 3 months.       Today he was seen at the bedside after returning from dialysis to review vital signs, labs, and a face-to-face for discharge.  He will discharge to home on 3/2/2024 with current medications and treatments.  He will have [facility] home care services PT OT home health aide.  Recent ultrasound right hand negative for DVT but did show superficial clot.  Isotoner glove provided.  He was with 2+ edema right hand, can wiggle fingers, and positive 3+ radial pulse.  He denied chest pain shortness of breath cough, Denied constipation or diarrhea.  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  He is no longer on Metformin due to kidney injury.   He does have chronic pain bilateral hips left more pronounced than the right.  Per hospital records he did receive bilateral hip injections in the hospital.  Per his report he ambulates with a cane and a four-wheel walker for longer distances. He completed Levaquin on 2/15/2024 for a UTI.  Recent labs were drawn at dialysis.  He reports that he is working with dialysis to see if they can change his times after discharge so he does not need to be there at 6 AM.    ALLERGIES:Ezetimibe-simvastatin and Metformin    PAST MEDICAL HISTORY: No past medical history on file.    PAST SURGICAL HISTORY:   has a past surgical history that includes back surgery; Lumbar Laminectomy (Bilateral, 3/8/2016); and Lumbar Laminectomy (3/13/2016).    FAMILY HISTORY: family history includes Cancer in his maternal grandfather, maternal grandmother, paternal grandfather, and paternal grandmother; Dementia in his father.    SOCIAL HISTORY:  reports that he has never smoked. He has never used smokeless tobacco. He reports current alcohol use. He reports that he does not use drugs.    ROS:  Constitutional: Negative for activity change, appetite change, fatigue and fever. HX Falls  HENT: Negative for congestion.  Right neck port currently  "undergoing dialysis Tuesday Thursday Saturday  Respiratory: Negative for cough, shortness of breath and wheezing.    Cardiovascular: Negative for chest pain and positive   leg swelling.   Gastrointestinal: Negative for abdominal distention, abdominal pain, constipation, diarrhea   Patient genitourinary: Negative for dysuria.   Musculoskeletal: Positive  for arthralgia. Negative for back pain. Bilateral hips   Skin: Negative for color change and wound. Neurological: Negative for dizziness.   Psychiatric/Behavioral: Negative for agitation, behavioral problems and confusion.     Physical Exam:  Constitutional:       Appearance: Patient is well-developed.   HENT:      Head: Normocephalic.   Eyes:      Conjunctiva/sclera: Conjunctivae normal.   Neck:      Musculoskeletal: Normal range of motion.   Cardiovascular:      Rate and Rhythm: Normal rate and regular rhythm.      Heart sounds: Normal heart sounds. No murmur.   Pulmonary:      Effort: No respiratory distress.      Breath sounds: Normal breath sounds. No wheezing or rales.   Abdominal:      General: Bowel sounds are normal. There is no distension.      Palpations: Abdomen is soft.      Tenderness: There is no abdominal tenderness.   Musculoskeletal:       Normal range of motion.   1-2+ lower extremity edema 2+ right hand   Skin:General:        Skin is warm.   Neurological:         Mental Status: Patient is alert and oriented to person, place, and time.   Psychiatric:         Behavior: Behavior normal.     Vitals:/50   Pulse 85   Temp 97.8  F (36.6  C)   Resp 20   Ht 1.753 m (5' 9\")   Wt 76.5 kg (168 lb 9.6 oz)   SpO2 96%   BMI 24.90 kg/m   and Body mass index is 24.9 kg/m .    Lab/Diagnostic data:   Recent Results (from the past 240 hour(s))   Urine Macroscopic with reflex to Microscopic    Collection Time: 02/27/24  7:25 PM   Result Value Ref Range    Color Urine Light Yellow Colorless, Straw, Light Yellow, Yellow    Appearance Urine Clear Clear    " Glucose Urine 150 (A) Negative mg/dL    Bilirubin Urine Negative Negative    Ketones Urine Negative Negative mg/dL    Specific Gravity Urine 1.011 1.003 - 1.035    Blood Urine Negative Negative    pH Urine 7.0 5.0 - 7.0    Protein Albumin Urine 30 (A) Negative mg/dL    Urobilinogen Urine Normal Normal, 2.0 mg/dL    Nitrite Urine Negative Negative    Leukocyte Esterase Urine Negative Negative    RBC Urine 1 <=2 /HPF    WBC Urine 5 <=5 /HPF    Squamous Epithelials Urine <1 <=1 /HPF    Mucus Urine Present (A) None Seen /LPF    Hyaline Casts Urine 1 <=2 /LPF   Urine Culture    Collection Time: 02/27/24  7:25 PM    Specimen: Urine, Clean Catch   Result Value Ref Range    Culture No Growth         MEDICATIONS:     Review of your medicines            Accurate as of February 29, 2024  9:34 AM. If you have any questions, ask your nurse or doctor.                CONTINUE these medicines which have NOT CHANGED        Dose / Directions   acetaminophen 325 MG tablet  Commonly known as: TYLENOL      Dose: 650 mg  [ACETAMINOPHEN (TYLENOL) 325 MG TABLET] Take 650 mg by mouth every 4 (four) hours as needed for pain.  Refills: 0     amLODIPine 2.5 MG tablet  Commonly known as: NORVASC  Used for: Accelerated hypertension      Dose: 5 mg  Take 2 tablets (5 mg) by mouth daily  Quantity: 60 tablet  Refills: 0     aspirin 81 MG EC tablet  Commonly known as: ASA      Dose: 81 mg  [ASPIRIN 81 MG EC TABLET] Take 81 mg by mouth daily.  Refills: 0     ferrous sulfate 325 (65 Fe) MG tablet  Commonly known as: FEROSUL  Used for: ROXIE (acute kidney injury) (H24)      Dose: 325 mg  Take 1 tablet (325 mg) by mouth every other day  Quantity: 30 tablet  Refills: 0     LANsoprazole 30 MG DR capsule  Commonly known as: PREVACID      Dose: 30 mg  Take 30 mg by mouth at bedtime  Refills: 0     polyethylene glycol 17 GM/Dose powder  Commonly known as: MIRALAX  Used for: Other constipation      Dose: 17 g  Take 17 g by mouth daily as needed for  constipation  Quantity: 510 g  Refills: 0     PreserVision AREDS 2 Caps      Dose: 1 capsule  [VIT C,M-FR-WUQHL-LUTEIN-ZEAXAN (PRESERVISION AREDS-2) 250-90-40-1 MG CAP] Take 1 capsule by mouth 2 (two) times a day.  Refills: 0     simvastatin 20 MG tablet  Commonly known as: ZOCOR  Used for: Hyperlipidemia, unspecified hyperlipidemia type      Dose: 20 mg  Take 1 tablet (20 mg) by mouth at bedtime  Quantity: 90 tablet  Refills: 0              ASSESSMENT/PLAN  Encounter Diagnoses   Name Primary?     Dialysis patient (H24) Yes     Physical deconditioning      Pain management      Dialysis patient Tuesday, Thursday/Saturday     Physical deconditioning PT OT    Falls will continue with therapy for strengthening    Pain management as needed Tylenol, gabapentin 900 mg twice daily,     Hypertension on Norvasc 2.5 mg daily, recently discontinued hydrochlorothiazide and valsartan due to elevated creatinine and potassium    diabetes type 2 he was on metformin however it was discontinued related to elevated creatinine fingersticks twice daily     GERD on Prevacid HDL on simvastatin    Anemia- HGB 7.7       DISCHARGE PLAN/FACE TO FACE:  I certify that services are/were furnished while this patient was under the care of a physician and that a physician or an allowed non-physician practitioner (NPP), had a face-to-face encounter that meets the physician face-to-face encounter requirements. The encounter was in whole, or in part, related to the primary reason for home health. The patient is confined to his/her home and needs intermittent skilled nursing, physical therapy, speech-language pathology, or the continued need for occupational therapy. A plan of care has been established by a physician and is periodically reviewed by a physician.  Date of Face-to-Face Encounter: 2/29/24    I certify that, based on my findings, the following services are medically necessary home health services: PT/OT/HHA    My clinical findings support  the need for the above skilled services because: PT OT for continued strength and endurance, home health aide to assist with activities of daily living.    This patient is homebound because: He is deconditioned following acute kidney injury and continues with dialysis Tuesday Thursday Saturday.  He will continue home therapy for strengthening    The patient is, or has been, under my care and I have initiated the establishment of the plan of care. This patient will be followed by a physician who will periodically review the plan of care.    Schedule follow up visit with primary care provider within 7 days to reestablish care.    Electronically signed by: Geneva Jaramillo CNP           Sincerely,        Geneva Jaramillo CNP

## 2024-02-29 NOTE — PROGRESS NOTES
Reviewed wherever he goes and does a photo she will [facility]  Chief Complaint   Patient presents with    Discharge Summary Nursing [facility] Medical Record Number:  [id number]  Place of Service where encounter took place:  [facility] ([facility]) [[id number]]  Code Status: unknown     HISTORY:      HPI:  Pa García  is 78 year old (1945) undergoing physical and occupational therapy. He is with past medical history hypertension, chronic bilateral hip pain, diabetes type 2, GERD, HDL,Excerpted from records  He presented  for progressively worsening generalized weakness and mechanical fall.  No LOS, did not hit head.  No associated symptoms.  No chest pain or palpitations. Presented afebrile, vitally stable, satting well on RA.  He had mild elevation of troponin that down trended on repeat otherwise CBC, CMP Pro-Julio, lactic acid, UA, chest x-ray, UA, flu/RSV/COVID were unremarkable.  Blood cultures have been no growth to date.  He was also noted to have atrial flutter with an elevated troponin per records the flutter appears to be new.  He was asymptomatic, cardiology was consulted and he had an echo that had normal LV function without wall motion abnormalities.  He was recommended he be a candidate for anticoagulation however patient refused due to history of recurrent falls.  He plans to follow-up outpatient cardiology for possible electrophysiology consult  He was recovering in the TCU and was sent to the ER for elevated creatinine  on 2/9/24 and returned to the TCU on 2/14/24 Excerpted from records   He admitted on 2/9/2024 for acute kidney injury in the setting of suspected metformin associated lactic acidosis.  He was admitted again  for acute renal failure with hyperkalemia and high anion gap metabolic acidosis. S/p HD on 2/9/24, 2/10/24, 2/12/24, 2/14/24. S/p renal biopsy, pathology read is pending. Plan is for HD following a TTS schedule for 3 months.       Today he was seen  at the bedside after returning from dialysis to review vital signs, labs, and a face-to-face for discharge.  He will discharge to home on 3/2/2024 with current medications and treatments.  He will have [facility] home care services PT OT home health aide.  Recent ultrasound right hand negative for DVT but did show superficial clot.  Isotoner glove provided.  He was with 2+ edema right hand, can wiggle fingers, and positive 3+ radial pulse.  He denied chest pain shortness of breath cough, Denied constipation or diarrhea.  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  He is no longer on Metformin due to kidney injury.   He does have chronic pain bilateral hips left more pronounced than the right.  Per hospital records he did receive bilateral hip injections in the hospital.  Per his report he ambulates with a cane and a four-wheel walker for longer distances. He completed Levaquin on 2/15/2024 for a UTI.  Recent labs were drawn at dialysis.  He reports that he is working with dialysis to see if they can change his times after discharge so he does not need to be there at 6 AM.    ALLERGIES:Ezetimibe-simvastatin and Metformin    PAST MEDICAL HISTORY: No past medical history on file.    PAST SURGICAL HISTORY:   has a past surgical history that includes back surgery; Lumbar Laminectomy (Bilateral, 3/8/2016); and Lumbar Laminectomy (3/13/2016).    FAMILY HISTORY: family history includes Cancer in his maternal grandfather, maternal grandmother, paternal grandfather, and paternal grandmother; Dementia in his father.    SOCIAL HISTORY:  reports that he has never smoked. He has never used smokeless tobacco. He reports current alcohol use. He reports that he does not use drugs.    ROS:  Constitutional: Negative for activity change, appetite change, fatigue and fever. HX Falls  HENT: Negative for congestion.  Right neck port currently undergoing dialysis Tuesday Thursday Saturday  Respiratory: Negative for cough,  "shortness of breath and wheezing.    Cardiovascular: Negative for chest pain and positive   leg swelling.   Gastrointestinal: Negative for abdominal distention, abdominal pain, constipation, diarrhea   Patient genitourinary: Negative for dysuria.   Musculoskeletal: Positive  for arthralgia. Negative for back pain. Bilateral hips   Skin: Negative for color change and wound. Neurological: Negative for dizziness.   Psychiatric/Behavioral: Negative for agitation, behavioral problems and confusion.     Physical Exam:  Constitutional:       Appearance: Patient is well-developed.   HENT:      Head: Normocephalic.   Eyes:      Conjunctiva/sclera: Conjunctivae normal.   Neck:      Musculoskeletal: Normal range of motion.   Cardiovascular:      Rate and Rhythm: Normal rate and regular rhythm.      Heart sounds: Normal heart sounds. No murmur.   Pulmonary:      Effort: No respiratory distress.      Breath sounds: Normal breath sounds. No wheezing or rales.   Abdominal:      General: Bowel sounds are normal. There is no distension.      Palpations: Abdomen is soft.      Tenderness: There is no abdominal tenderness.   Musculoskeletal:       Normal range of motion.   1-2+ lower extremity edema 2+ right hand   Skin:General:        Skin is warm.   Neurological:         Mental Status: Patient is alert and oriented to person, place, and time.   Psychiatric:         Behavior: Behavior normal.     Vitals:/50   Pulse 85   Temp 97.8  F (36.6  C)   Resp 20   Ht 1.753 m (5' 9\")   Wt 76.5 kg (168 lb 9.6 oz)   SpO2 96%   BMI 24.90 kg/m   and Body mass index is 24.9 kg/m .    Lab/Diagnostic data:   Recent Results (from the past 240 hour(s))   Urine Macroscopic with reflex to Microscopic    Collection Time: 02/27/24  7:25 PM   Result Value Ref Range    Color Urine Light Yellow Colorless, Straw, Light Yellow, Yellow    Appearance Urine Clear Clear    Glucose Urine 150 (A) Negative mg/dL    Bilirubin Urine Negative Negative    " Ketones Urine Negative Negative mg/dL    Specific Gravity Urine 1.011 1.003 - 1.035    Blood Urine Negative Negative    pH Urine 7.0 5.0 - 7.0    Protein Albumin Urine 30 (A) Negative mg/dL    Urobilinogen Urine Normal Normal, 2.0 mg/dL    Nitrite Urine Negative Negative    Leukocyte Esterase Urine Negative Negative    RBC Urine 1 <=2 /HPF    WBC Urine 5 <=5 /HPF    Squamous Epithelials Urine <1 <=1 /HPF    Mucus Urine Present (A) None Seen /LPF    Hyaline Casts Urine 1 <=2 /LPF   Urine Culture    Collection Time: 02/27/24  7:25 PM    Specimen: Urine, Clean Catch   Result Value Ref Range    Culture No Growth         MEDICATIONS:     Review of your medicines            Accurate as of February 29, 2024  9:34 AM. If you have any questions, ask your nurse or doctor.                CONTINUE these medicines which have NOT CHANGED        Dose / Directions   acetaminophen 325 MG tablet  Commonly known as: TYLENOL      Dose: 650 mg  [ACETAMINOPHEN (TYLENOL) 325 MG TABLET] Take 650 mg by mouth every 4 (four) hours as needed for pain.  Refills: 0     amLODIPine 2.5 MG tablet  Commonly known as: NORVASC  Used for: Accelerated hypertension      Dose: 5 mg  Take 2 tablets (5 mg) by mouth daily  Quantity: 60 tablet  Refills: 0     aspirin 81 MG EC tablet  Commonly known as: ASA      Dose: 81 mg  [ASPIRIN 81 MG EC TABLET] Take 81 mg by mouth daily.  Refills: 0     ferrous sulfate 325 (65 Fe) MG tablet  Commonly known as: FEROSUL  Used for: ROXIE (acute kidney injury) (H24)      Dose: 325 mg  Take 1 tablet (325 mg) by mouth every other day  Quantity: 30 tablet  Refills: 0     LANsoprazole 30 MG DR capsule  Commonly known as: PREVACID      Dose: 30 mg  Take 30 mg by mouth at bedtime  Refills: 0     polyethylene glycol 17 GM/Dose powder  Commonly known as: MIRALAX  Used for: Other constipation      Dose: 17 g  Take 17 g by mouth daily as needed for constipation  Quantity: 510 g  Refills: 0     PreserVision AREDS 2 Caps      Dose: 1  capsule  [VIT C,O-WF-QKVNC-LUTEIN-ZEAXAN (PRESERVISION AREDS-2) 250-90-40-1 MG CAP] Take 1 capsule by mouth 2 (two) times a day.  Refills: 0     simvastatin 20 MG tablet  Commonly known as: ZOCOR  Used for: Hyperlipidemia, unspecified hyperlipidemia type      Dose: 20 mg  Take 1 tablet (20 mg) by mouth at bedtime  Quantity: 90 tablet  Refills: 0              ASSESSMENT/PLAN  Encounter Diagnoses   Name Primary?    Dialysis patient (H24) Yes    Physical deconditioning     Pain management      Dialysis patient Tuesday, Thursday/Saturday     Physical deconditioning PT OT    Falls will continue with therapy for strengthening    Pain management as needed Tylenol, gabapentin 900 mg twice daily,     Hypertension on Norvasc 2.5 mg daily, recently discontinued hydrochlorothiazide and valsartan due to elevated creatinine and potassium    diabetes type 2 he was on metformin however it was discontinued related to elevated creatinine fingersticks twice daily     GERD on Prevacid HDL on simvastatin    Anemia- HGB 7.7       DISCHARGE PLAN/FACE TO FACE:  I certify that services are/were furnished while this patient was under the care of a physician and that a physician or an allowed non-physician practitioner (NPP), had a face-to-face encounter that meets the physician face-to-face encounter requirements. The encounter was in whole, or in part, related to the primary reason for home health. The patient is confined to his/her home and needs intermittent skilled nursing, physical therapy, speech-language pathology, or the continued need for occupational therapy. A plan of care has been established by a physician and is periodically reviewed by a physician.  Date of Face-to-Face Encounter: 2/29/24    I certify that, based on my findings, the following services are medically necessary home health services: PT/OT/HHA    My clinical findings support the need for the above skilled services because: PT OT for continued strength and  endurance, home health aide to assist with activities of daily living.    This patient is homebound because: He is deconditioned following acute kidney injury and continues with dialysis Tuesday Thursday Saturday.  He will continue home therapy for strengthening    The patient is, or has been, under my care and I have initiated the establishment of the plan of care. This patient will be followed by a physician who will periodically review the plan of care.    Schedule follow up visit with primary care provider within 7 days to reestablish care.    Electronically signed by: Geneva Jaramillo CNP

## 2024-03-01 ENCOUNTER — TRANSITIONAL CARE UNIT VISIT (OUTPATIENT)
Dept: GERIATRICS | Facility: CLINIC | Age: 79
End: 2024-03-01
Payer: COMMERCIAL

## 2024-03-01 ENCOUNTER — HOSPITAL ENCOUNTER (EMERGENCY)
Facility: CLINIC | Age: 79
Discharge: HOME | End: 2024-03-01
Attending: EMERGENCY MEDICINE | Admitting: EMERGENCY MEDICINE
Payer: COMMERCIAL

## 2024-03-01 VITALS
DIASTOLIC BLOOD PRESSURE: 53 MMHG | BODY MASS INDEX: 24.88 KG/M2 | OXYGEN SATURATION: 98 % | WEIGHT: 168 LBS | RESPIRATION RATE: 18 BRPM | SYSTOLIC BLOOD PRESSURE: 145 MMHG | HEIGHT: 69 IN | TEMPERATURE: 97.6 F | HEART RATE: 83 BPM

## 2024-03-01 VITALS
HEIGHT: 69 IN | OXYGEN SATURATION: 98 % | TEMPERATURE: 98.2 F | BODY MASS INDEX: 24.88 KG/M2 | WEIGHT: 168 LBS | RESPIRATION RATE: 18 BRPM | SYSTOLIC BLOOD PRESSURE: 164 MMHG | DIASTOLIC BLOOD PRESSURE: 72 MMHG | HEART RATE: 71 BPM

## 2024-03-01 DIAGNOSIS — T82.898A PROBLEM WITH DIALYSIS ACCESS, INITIAL ENCOUNTER (H): ICD-10-CM

## 2024-03-01 DIAGNOSIS — U07.1 INFECTION DUE TO 2019 NOVEL CORONAVIRUS: Primary | ICD-10-CM

## 2024-03-01 DIAGNOSIS — D64.9 ANEMIA, UNSPECIFIED TYPE: ICD-10-CM

## 2024-03-01 DIAGNOSIS — N17.9 AKI (ACUTE KIDNEY INJURY) (H): ICD-10-CM

## 2024-03-01 LAB
ALBUMIN UR-MCNC: 20 MG/DL
ANION GAP SERPL CALCULATED.3IONS-SCNC: 11 MMOL/L (ref 7–15)
APPEARANCE UR: CLEAR
BASOPHILS # BLD AUTO: 0 10E3/UL (ref 0–0.2)
BASOPHILS NFR BLD AUTO: 0 %
BILIRUB UR QL STRIP: NEGATIVE
BUN SERPL-MCNC: 18.5 MG/DL (ref 8–23)
CALCIUM SERPL-MCNC: 8.8 MG/DL (ref 8.8–10.2)
CHLORIDE SERPL-SCNC: 104 MMOL/L (ref 98–107)
COLOR UR AUTO: COLORLESS
CREAT SERPL-MCNC: 2.33 MG/DL (ref 0.67–1.17)
DEPRECATED HCO3 PLAS-SCNC: 27 MMOL/L (ref 22–29)
EGFRCR SERPLBLD CKD-EPI 2021: 28 ML/MIN/1.73M2
EOSINOPHIL # BLD AUTO: 0.2 10E3/UL (ref 0–0.7)
EOSINOPHIL NFR BLD AUTO: 3 %
ERYTHROCYTE [DISTWIDTH] IN BLOOD BY AUTOMATED COUNT: 13.2 % (ref 10–15)
GLUCOSE SERPL-MCNC: 109 MG/DL (ref 70–99)
GLUCOSE UR STRIP-MCNC: NEGATIVE MG/DL
HCT VFR BLD AUTO: 22.1 % (ref 40–53)
HGB BLD-MCNC: 7.2 G/DL (ref 13.3–17.7)
HGB UR QL STRIP: NEGATIVE
IMM GRANULOCYTES # BLD: 0 10E3/UL
IMM GRANULOCYTES NFR BLD: 0 %
KETONES UR STRIP-MCNC: NEGATIVE MG/DL
LEUKOCYTE ESTERASE UR QL STRIP: ABNORMAL
LYMPHOCYTES # BLD AUTO: 1.1 10E3/UL (ref 0–5.3)
LYMPHOCYTES NFR BLD AUTO: 16 %
MCH RBC QN AUTO: 32.7 PG (ref 26.5–33)
MCHC RBC AUTO-ENTMCNC: 32.6 G/DL (ref 31.5–36.5)
MCV RBC AUTO: 101 FL (ref 78–100)
MONOCYTES # BLD AUTO: 0.6 10E3/UL (ref 0–1.3)
MONOCYTES NFR BLD AUTO: 9 %
MUCOUS THREADS #/AREA URNS LPF: PRESENT /LPF
NEUTROPHILS # BLD AUTO: 5 10E3/UL (ref 1.6–8.3)
NEUTROPHILS NFR BLD AUTO: 72 %
NITRATE UR QL: NEGATIVE
NRBC # BLD AUTO: 0 10E3/UL
NRBC BLD AUTO-RTO: 0 /100
PH UR STRIP: 8 [PH] (ref 5–7)
PLATELET # BLD AUTO: 298 10E3/UL (ref 150–450)
POTASSIUM SERPL-SCNC: 4.4 MMOL/L (ref 3.4–5.3)
RBC # BLD AUTO: 2.2 10E6/UL (ref 4.4–5.9)
RBC URINE: 2 /HPF
SODIUM SERPL-SCNC: 142 MMOL/L (ref 135–145)
SP GR UR STRIP: 1.01 (ref 1–1.03)
SQUAMOUS EPITHELIAL: <1 /HPF
UROBILINOGEN UR STRIP-MCNC: <2 MG/DL
WBC # BLD AUTO: 6.9 10E3/UL (ref 4–11)
WBC URINE: 12 /HPF

## 2024-03-01 PROCEDURE — 87086 URINE CULTURE/COLONY COUNT: CPT | Performed by: EMERGENCY MEDICINE

## 2024-03-01 PROCEDURE — 36415 COLL VENOUS BLD VENIPUNCTURE: CPT | Performed by: EMERGENCY MEDICINE

## 2024-03-01 PROCEDURE — 85025 COMPLETE CBC W/AUTO DIFF WBC: CPT | Performed by: EMERGENCY MEDICINE

## 2024-03-01 PROCEDURE — 81001 URINALYSIS AUTO W/SCOPE: CPT | Performed by: EMERGENCY MEDICINE

## 2024-03-01 PROCEDURE — 99310 SBSQ NF CARE HIGH MDM 45: CPT | Performed by: NURSE PRACTITIONER

## 2024-03-01 PROCEDURE — 80048 BASIC METABOLIC PNL TOTAL CA: CPT | Performed by: EMERGENCY MEDICINE

## 2024-03-01 PROCEDURE — 99283 EMERGENCY DEPT VISIT LOW MDM: CPT

## 2024-03-01 ASSESSMENT — COLUMBIA-SUICIDE SEVERITY RATING SCALE - C-SSRS
6. HAVE YOU EVER DONE ANYTHING, STARTED TO DO ANYTHING, OR PREPARED TO DO ANYTHING TO END YOUR LIFE?: NO
1. IN THE PAST MONTH, HAVE YOU WISHED YOU WERE DEAD OR WISHED YOU COULD GO TO SLEEP AND NOT WAKE UP?: NO
2. HAVE YOU ACTUALLY HAD ANY THOUGHTS OF KILLING YOURSELF IN THE PAST MONTH?: NO

## 2024-03-01 ASSESSMENT — ACTIVITIES OF DAILY LIVING (ADL)
ADLS_ACUITY_SCORE: 38

## 2024-03-01 NOTE — PROGRESS NOTES
RENAL    Discussed with Dr. Worthy; patient presented with non-tunneled CVC dislodged. Reviewed patient's kidney biopsy; Mostly ATN and some underlying diabetic nephropathy. Reports good urine volumes and has not needed UF with dialysis. Creatinine 2.3 (last dialysis on Wednesday). Will plan to discharge ER without CVC. Our office will call [facility] to make arrangements for labs/follow up next week to assess renal recovery.       Evon Mckeon PA-C  [facility]  [phone]

## 2024-03-01 NOTE — LETTER
3/1/2024        RE: Pa García  [address] [facility] [address]        Reviewed wherever he goes and does a photo she will [facility]  Chief Complaint   Patient presents with     RECHECK     Fabricio Medical Record Number:  [id number]  Place of Service where encounter took place:  [facility]) [[id number]]  Code Status: unknown     HISTORY:      HPI:  Pa García  is 78 year old (1945) undergoing physical and occupational therapy. He is with past medical history hypertension, chronic bilateral hip pain, diabetes type 2, GERD, HDL,Excerpted from records  He presented  for progressively worsening generalized weakness and mechanical fall.  No LOS, did not hit head.  No associated symptoms.  No chest pain or palpitations. Presented afebrile, vitally stable, satting well on RA.  He had mild elevation of troponin that down trended on repeat otherwise CBC, CMP Pro-Julio, lactic acid, UA, chest x-ray, UA, flu/RSV/COVID were unremarkable.  Blood cultures have been no growth to date.  He was also noted to have atrial flutter with an elevated troponin per records the flutter appears to be new.  He was asymptomatic, cardiology was consulted and he had an echo that had normal LV function without wall motion abnormalities.  He was recommended he be a candidate for anticoagulation however patient refused due to history of recurrent falls.  He plans to follow-up outpatient cardiology for possible electrophysiology consult  He was recovering in the TCU and was sent to the ER for elevated creatinine  on 2/9/24 and returned to the TCU on 2/14/24 Excerpted from records   He admitted on 2/9/2024 for acute kidney injury in the setting of suspected metformin associated lactic acidosis.  He was admitted again  for acute renal failure with hyperkalemia and high anion gap metabolic acidosis. S/p HD on 2/9/24, 2/10/24, 2/12/24, 2/14/24. S/p renal biopsy, pathology read is pending. Plan is for HD  following a TTS schedule for 3 months.       Today he is seen for new diagnosis of COVID 19 and for reports of his right jugular dialysis port falling out.  Patient reported his port was loose and the next thing he knew it was in his hand.  His dressing was intact however he did have some bloody drainage on the dressing and writer applied a pressure dressing.  He was sent to the ER for evaluation and replacement.  He is also asymptomatic regarding his COVID however he was started on molnupiravir.  Recent ultrasound right hand negative for DVT but did show superficial clot.  Isotoner glove provided.  He was with 2+ edema right hand, can wiggle fingers, and positive 3+ radial pulse.  He denied chest pain shortness of breath cough, Denied constipation or diarrhea.  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  He is no longer on Metformin due to kidney injury.   He does have chronic pain bilateral hips left more pronounced than the right.  Per hospital records he did receive bilateral hip injections in the hospital.  Per his report he ambulates with a cane and a four-wheel walker for longer distances. He completed Levaquin on 2/15/2024 for a UTI.  Recent labs were drawn at dialysis.     ALLERGIES:Ezetimibe-simvastatin and Metformin    PAST MEDICAL HISTORY: History reviewed. No pertinent past medical history.    PAST SURGICAL HISTORY:   has a past surgical history that includes back surgery; Lumbar Laminectomy (Bilateral, 3/8/2016); and Lumbar Laminectomy (3/13/2016).    FAMILY HISTORY: family history includes Cancer in his maternal grandfather, maternal grandmother, paternal grandfather, and paternal grandmother; Dementia in his father.    SOCIAL HISTORY:  reports that he has never smoked. He has never used smokeless tobacco. He reports current alcohol use. He reports that he does not use drugs.    ROS:  Constitutional: Negative for activity change, appetite change, fatigue and fever. HX Falls  HENT:  "Negative for congestion.  Right neck port fell out this morning.  Sent to the ER for further evaluation and replacement   respiratory: Negative for cough, shortness of breath and wheezing.    Cardiovascular: Negative for chest pain and positive   leg swelling.   Gastrointestinal: Negative for abdominal distention, abdominal pain, constipation, diarrhea   Patient genitourinary: Negative for dysuria.   Musculoskeletal: Positive  for arthralgia. Negative for back pain. Bilateral hips   Skin: Negative for color change and wound. Neurological: Negative for dizziness.   Psychiatric/Behavioral: Negative for agitation, behavioral problems and confusion.     Physical Exam:  Constitutional:       Appearance: Patient is well-developed.   HENT:      Head: Normocephalic.   Eyes:      Conjunctiva/sclera: Conjunctivae normal.   Neck:      Musculoskeletal: Normal range of motion.   Cardiovascular:      Rate and Rhythm: Normal rate and regular rhythm.      Heart sounds: Normal heart sounds. No murmur.   Pulmonary:      Effort: No respiratory distress.      Breath sounds: Normal breath sounds. No wheezing or rales.   Abdominal:      General: Bowel sounds are normal. There is no distension.      Palpations: Abdomen is soft.      Tenderness: There is no abdominal tenderness.   Musculoskeletal:       Normal range of motion.   1-2+ lower extremity edema 2+ right hand   Skin:General:        Skin is warm.   Neurological:         Mental Status: Patient is alert and oriented to person, place, and time.   Psychiatric:         Behavior: Behavior normal.     Vitals:BP (!) 145/53   Pulse 83   Temp 97.6  F (36.4  C)   Resp 18   Ht 1.753 m (5' 9\")   Wt 76.2 kg (168 lb)   SpO2 98%   BMI 24.81 kg/m   and Body mass index is 24.81 kg/m .    Lab/Diagnostic data:   Recent Results (from the past 240 hour(s))   Urine Macroscopic with reflex to Microscopic    Collection Time: 02/27/24  7:25 PM   Result Value Ref Range    Color Urine Light Yellow " Colorless, Straw, Light Yellow, Yellow    Appearance Urine Clear Clear    Glucose Urine 150 (A) Negative mg/dL    Bilirubin Urine Negative Negative    Ketones Urine Negative Negative mg/dL    Specific Gravity Urine 1.011 1.003 - 1.035    Blood Urine Negative Negative    pH Urine 7.0 5.0 - 7.0    Protein Albumin Urine 30 (A) Negative mg/dL    Urobilinogen Urine Normal Normal, 2.0 mg/dL    Nitrite Urine Negative Negative    Leukocyte Esterase Urine Negative Negative    RBC Urine 1 <=2 /HPF    WBC Urine 5 <=5 /HPF    Squamous Epithelials Urine <1 <=1 /HPF    Mucus Urine Present (A) None Seen /LPF    Hyaline Casts Urine 1 <=2 /LPF   Urine Culture    Collection Time: 02/27/24  7:25 PM    Specimen: Urine, Clean Catch   Result Value Ref Range    Culture No Growth    Basic metabolic panel    Collection Time: 03/01/24  9:29 AM   Result Value Ref Range    Sodium 142 135 - 145 mmol/L    Potassium 4.4 3.4 - 5.3 mmol/L    Chloride 104 98 - 107 mmol/L    Carbon Dioxide (CO2) 27 22 - 29 mmol/L    Anion Gap 11 7 - 15 mmol/L    Urea Nitrogen 18.5 8.0 - 23.0 mg/dL    Creatinine 2.33 (H) 0.67 - 1.17 mg/dL    GFR Estimate 28 (L) >60 mL/min/1.73m2    Calcium 8.8 8.8 - 10.2 mg/dL    Glucose 109 (H) 70 - 99 mg/dL   UA with Microscopic reflex to Culture    Collection Time: 03/01/24  9:29 AM    Specimen: Urine, Midstream   Result Value Ref Range    Color Urine Colorless Colorless, Straw, Light Yellow, Yellow    Appearance Urine Clear Clear    Glucose Urine Negative Negative mg/dL    Bilirubin Urine Negative Negative    Ketones Urine Negative Negative mg/dL    Specific Gravity Urine 1.009 1.001 - 1.030    Blood Urine Negative Negative    pH Urine 8.0 (H) 5.0 - 7.0    Protein Albumin Urine 20 (A) Negative mg/dL    Urobilinogen Urine <2.0 <2.0 mg/dL    Nitrite Urine Negative Negative    Leukocyte Esterase Urine 25 Michael/uL (A) Negative    Mucus Urine Present (A) None Seen /LPF    RBC Urine 2 <=2 /HPF    WBC Urine 12 (H) <=5 /HPF    Squamous  Epithelials Urine <1 <=1 /HPF   CBC with platelets and differential    Collection Time: 03/01/24  9:29 AM   Result Value Ref Range    WBC Count 6.9 4.0 - 11.0 10e3/uL    RBC Count 2.20 (L) 4.40 - 5.90 10e6/uL    Hemoglobin 7.2 (L) 13.3 - 17.7 g/dL    Hematocrit 22.1 (L) 40.0 - 53.0 %     (H) 78 - 100 fL    MCH 32.7 26.5 - 33.0 pg    MCHC 32.6 31.5 - 36.5 g/dL    RDW 13.2 10.0 - 15.0 %    Platelet Count 298 150 - 450 10e3/uL    % Neutrophils 72 %    % Lymphocytes 16 %    % Monocytes 9 %    % Eosinophils 3 %    % Basophils 0 %    % Immature Granulocytes 0 %    NRBCs per 100 WBC 0 <1 /100    Absolute Neutrophils 5.0 1.6 - 8.3 10e3/uL    Absolute Lymphocytes 1.1 0.0 - 5.3 10e3/uL    Absolute Monocytes 0.6 0.0 - 1.3 10e3/uL    Absolute Eosinophils 0.2 0.0 - 0.7 10e3/uL    Absolute Basophils 0.0 0.0 - 0.2 10e3/uL    Absolute Immature Granulocytes 0.0 <=0.4 10e3/uL    Absolute NRBCs 0.0 10e3/uL        MEDICATIONS:     Review of your medicines            Accurate as of March 1, 2024  6:02 PM. If you have any questions, ask your nurse or doctor.                CONTINUE these medicines which have NOT CHANGED        Dose / Directions   acetaminophen 325 MG tablet  Commonly known as: TYLENOL      Dose: 650 mg  [ACETAMINOPHEN (TYLENOL) 325 MG TABLET] Take 650 mg by mouth every 4 (four) hours as needed for pain.  Refills: 0     amLODIPine 2.5 MG tablet  Commonly known as: NORVASC  Used for: Accelerated hypertension      Dose: 5 mg  Take 2 tablets (5 mg) by mouth daily  Quantity: 60 tablet  Refills: 0     aspirin 81 MG EC tablet  Commonly known as: ASA      Dose: 81 mg  [ASPIRIN 81 MG EC TABLET] Take 81 mg by mouth daily.  Refills: 0     ferrous sulfate 325 (65 Fe) MG tablet  Commonly known as: FEROSUL  Used for: ROXIE (acute kidney injury) (H24)      Dose: 325 mg  Take 1 tablet (325 mg) by mouth every other day  Quantity: 30 tablet  Refills: 0     LANsoprazole 30 MG DR capsule  Commonly known as: PREVACID      Dose: 30  mg  Take 30 mg by mouth at bedtime  Refills: 0     molnupiravir 200 MG capsule  Commonly known as: LAGEVRIO      Dose: 800 mg  Take 800 mg by mouth every 12 hours  Refills: 0     polyethylene glycol 17 GM/Dose powder  Commonly known as: MIRALAX  Used for: Other constipation      Dose: 17 g  Take 17 g by mouth daily as needed for constipation  Quantity: 510 g  Refills: 0     PreserVision AREDS 2 Caps      Dose: 1 capsule  [VIT C,Y-AH-HURGY-LUTEIN-ZEAXAN (PRESERVISION AREDS-2) 250-90-40-1 MG CAP] Take 1 capsule by mouth 2 (two) times a day.  Refills: 0     simvastatin 20 MG tablet  Commonly known as: ZOCOR  Used for: Hyperlipidemia, unspecified hyperlipidemia type      Dose: 20 mg  Take 1 tablet (20 mg) by mouth at bedtime  Quantity: 90 tablet  Refills: 0              ASSESSMENT/PLAN  Encounter Diagnosis   Name Primary?     Infection due to 2019 novel coronavirus Yes     Dialysis port fell out and patient sent to the ER for evaluation and replacement    COVID-positive started on molnupiravir currently asymptomatic    Dialysis patient Tuesday, Thursday/Saturday     Physical deconditioning PT OT    Falls will continue with therapy for strengthening    Pain management as needed Tylenol, gabapentin 900 mg twice daily,     Hypertension on Norvasc 2.5 mg daily, recently discontinued hydrochlorothiazide and valsartan due to elevated creatinine and potassium    diabetes type 2 he was on metformin however it was discontinued related to elevated creatinine fingersticks twice daily     GERD on Prevacid HDL on simvastatin    Anemia- HGB 7.7     Electronically signed by: Geneva Jaramillo CNP           Sincerely,        Geneva Jaramillo CNP

## 2024-03-01 NOTE — ED NOTES
Expected Patient Referral to ED  8:26 AM    Referring Clinic/Provider:  [facility]    Reason for referral/Clinical facts:  Dialysis access came out this morning  COVID +    Recommendations provided:  Send to ED for further evaluation    Caller was informed that this institution does possess the capabilities and/or resources to provide for patient and should be transferred to our facility.    Discussed that if direct admit is sought and any hurdles are encountered, this ED would be happy to see the patient and evaluate.    Informed caller that recommendations provided are recommendations based only on the facts provided and that they responsible to accept or reject the advice, or to seek a formal in person consultation as needed and that this ED will see/treat patient should they arrive.      Nguyen Worthy MD  [facility] EMERGENCY ROOM  [address] [place] [id number] [phone]       Nguyen Worthy MD  03/01/24 [id number]

## 2024-03-01 NOTE — DISCHARGE INSTRUCTIONS
Your renal function is improving.  Please follow-up with nephrology next week for recheck of your kidney function.  Dr. Mckeon's office will reach out to you to set up an appointment.  For now, you can discontinue dialysis.  There was no need of dialysis access placed.    Your hemoglobin today was 7.1. Please follow-up with your regular doctor for a recheck of your hemoglobin. If you feel short of breath, have chest pain, feel lightheaded, dizzy, increasingly fatigued, prior to follow-up, please return to the ED. I also placed a referral for you to follow-up with hematology as well for anemia.

## 2024-03-01 NOTE — ED TRIAGE NOTES
Pt presents via EMS from [facility] to have hemodialysis port replaced. Port was dislodged overnight. Bleeding controlled. Pt has dialysis tomorrow. Tested Covid positive today and denies any symptoms.      Triage Assessment (Adult)       Row Name 03/01/24 0901          Triage Assessment    Airway WDL WDL        Respiratory WDL    Respiratory WDL WDL        Skin Circulation/Temperature WDL    Skin Circulation/Temperature WDL WDL        Cardiac WDL    Cardiac WDL WDL        Peripheral/Neurovascular WDL    Peripheral Neurovascular WDL WDL     Capillary Refill, General less than/equal to 3 secs        Cognitive/Neuro/Behavioral WDL    Cognitive/Neuro/Behavioral WDL WDL        [place] Coma Scale    Best Eye Response 4-->(E4) spontaneous     Best Motor Response 6-->(M6) obeys commands     Best Verbal Response 5-->(V5) oriented     [place] Coma Scale Score 15

## 2024-03-01 NOTE — PROGRESS NOTES
Reviewed wherever he goes and does a photo she will [facility] GERIATRIC SERVICES  Chief Complaint   Patient presents with    ROBERT Regalado Medical Record Number:  [id number]  Place of Service where encounter took place:  [facility] ([facility]) [[id number]]  Code Status: unknown     HISTORY:      HPI:  Pa García  is 78 year old (1945) undergoing physical and occupational therapy. He is with past medical history hypertension, chronic bilateral hip pain, diabetes type 2, GERD, HDL,Excerpted from records  He presented  for progressively worsening generalized weakness and mechanical fall.  No LOS, did not hit head.  No associated symptoms.  No chest pain or palpitations. Presented afebrile, vitally stable, satting well on RA.  He had mild elevation of troponin that down trended on repeat otherwise CBC, CMP Pro-Julio, lactic acid, UA, chest x-ray, UA, flu/RSV/COVID were unremarkable.  Blood cultures have been no growth to date.  He was also noted to have atrial flutter with an elevated troponin per records the flutter appears to be new.  He was asymptomatic, cardiology was consulted and he had an echo that had normal LV function without wall motion abnormalities.  He was recommended he be a candidate for anticoagulation however patient refused due to history of recurrent falls.  He plans to follow-up outpatient cardiology for possible electrophysiology consult  He was recovering in the TCU and was sent to the ER for elevated creatinine  on 2/9/24 and returned to the TCU on 2/14/24 Excerpted from records   He admitted on 2/9/2024 for acute kidney injury in the setting of suspected metformin associated lactic acidosis.  He was admitted again  for acute renal failure with hyperkalemia and high anion gap metabolic acidosis. S/p HD on 2/9/24, 2/10/24, 2/12/24, 2/14/24. S/p renal biopsy, pathology read is pending. Plan is for HD following a TTS schedule for 3 months.       Today he is seen for new diagnosis of  COVID 19 and for reports of his right jugular dialysis port falling out.  Patient reported his port was loose and the next thing he knew it was in his hand.  His dressing was intact however he did have some bloody drainage on the dressing and writer applied a pressure dressing.  He was sent to the ER for evaluation and replacement.  He is also asymptomatic regarding his COVID however he was started on molnupiravir.  Recent ultrasound right hand negative for DVT but did show superficial clot.  Isotoner glove provided.  He was with 2+ edema right hand, can wiggle fingers, and positive 3+ radial pulse.  He denied chest pain shortness of breath cough, Denied constipation or diarrhea.  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  He is no longer on Metformin due to kidney injury.   He does have chronic pain bilateral hips left more pronounced than the right.  Per hospital records he did receive bilateral hip injections in the hospital.  Per his report he ambulates with a cane and a four-wheel walker for longer distances. He completed Levaquin on 2/15/2024 for a UTI.  Recent labs were drawn at dialysis.     ALLERGIES:Ezetimibe-simvastatin and Metformin    PAST MEDICAL HISTORY: History reviewed. No pertinent past medical history.    PAST SURGICAL HISTORY:   has a past surgical history that includes back surgery; Lumbar Laminectomy (Bilateral, 3/8/2016); and Lumbar Laminectomy (3/13/2016).    FAMILY HISTORY: family history includes Cancer in his maternal grandfather, maternal grandmother, paternal grandfather, and paternal grandmother; Dementia in his father.    SOCIAL HISTORY:  reports that he has never smoked. He has never used smokeless tobacco. He reports current alcohol use. He reports that he does not use drugs.    ROS:  Constitutional: Negative for activity change, appetite change, fatigue and fever. HX Falls  HENT: Negative for congestion.  Right neck port fell out this morning.  Sent to the ER for  "further evaluation and replacement   respiratory: Negative for cough, shortness of breath and wheezing.    Cardiovascular: Negative for chest pain and positive   leg swelling.   Gastrointestinal: Negative for abdominal distention, abdominal pain, constipation, diarrhea   Patient genitourinary: Negative for dysuria.   Musculoskeletal: Positive  for arthralgia. Negative for back pain. Bilateral hips   Skin: Negative for color change and wound. Neurological: Negative for dizziness.   Psychiatric/Behavioral: Negative for agitation, behavioral problems and confusion.     Physical Exam:  Constitutional:       Appearance: Patient is well-developed.   HENT:      Head: Normocephalic.   Eyes:      Conjunctiva/sclera: Conjunctivae normal.   Neck:      Musculoskeletal: Normal range of motion.   Cardiovascular:      Rate and Rhythm: Normal rate and regular rhythm.      Heart sounds: Normal heart sounds. No murmur.   Pulmonary:      Effort: No respiratory distress.      Breath sounds: Normal breath sounds. No wheezing or rales.   Abdominal:      General: Bowel sounds are normal. There is no distension.      Palpations: Abdomen is soft.      Tenderness: There is no abdominal tenderness.   Musculoskeletal:       Normal range of motion.   1-2+ lower extremity edema 2+ right hand   Skin:General:        Skin is warm.   Neurological:         Mental Status: Patient is alert and oriented to person, place, and time.   Psychiatric:         Behavior: Behavior normal.     Vitals:BP (!) 145/53   Pulse 83   Temp 97.6  F (36.4  C)   Resp 18   Ht 1.753 m (5' 9\")   Wt 76.2 kg (168 lb)   SpO2 98%   BMI 24.81 kg/m   and Body mass index is 24.81 kg/m .    Lab/Diagnostic data:   Recent Results (from the past 240 hour(s))   Urine Macroscopic with reflex to Microscopic    Collection Time: 02/27/24  7:25 PM   Result Value Ref Range    Color Urine Light Yellow Colorless, Straw, Light Yellow, Yellow    Appearance Urine Clear Clear    Glucose Urine " 150 (A) Negative mg/dL    Bilirubin Urine Negative Negative    Ketones Urine Negative Negative mg/dL    Specific Gravity Urine 1.011 1.003 - 1.035    Blood Urine Negative Negative    pH Urine 7.0 5.0 - 7.0    Protein Albumin Urine 30 (A) Negative mg/dL    Urobilinogen Urine Normal Normal, 2.0 mg/dL    Nitrite Urine Negative Negative    Leukocyte Esterase Urine Negative Negative    RBC Urine 1 <=2 /HPF    WBC Urine 5 <=5 /HPF    Squamous Epithelials Urine <1 <=1 /HPF    Mucus Urine Present (A) None Seen /LPF    Hyaline Casts Urine 1 <=2 /LPF   Urine Culture    Collection Time: 02/27/24  7:25 PM    Specimen: Urine, Clean Catch   Result Value Ref Range    Culture No Growth    Basic metabolic panel    Collection Time: 03/01/24  9:29 AM   Result Value Ref Range    Sodium 142 135 - 145 mmol/L    Potassium 4.4 3.4 - 5.3 mmol/L    Chloride 104 98 - 107 mmol/L    Carbon Dioxide (CO2) 27 22 - 29 mmol/L    Anion Gap 11 7 - 15 mmol/L    Urea Nitrogen 18.5 8.0 - 23.0 mg/dL    Creatinine 2.33 (H) 0.67 - 1.17 mg/dL    GFR Estimate 28 (L) >60 mL/min/1.73m2    Calcium 8.8 8.8 - 10.2 mg/dL    Glucose 109 (H) 70 - 99 mg/dL   UA with Microscopic reflex to Culture    Collection Time: 03/01/24  9:29 AM    Specimen: Urine, Midstream   Result Value Ref Range    Color Urine Colorless Colorless, Straw, Light Yellow, Yellow    Appearance Urine Clear Clear    Glucose Urine Negative Negative mg/dL    Bilirubin Urine Negative Negative    Ketones Urine Negative Negative mg/dL    Specific Gravity Urine 1.009 1.001 - 1.030    Blood Urine Negative Negative    pH Urine 8.0 (H) 5.0 - 7.0    Protein Albumin Urine 20 (A) Negative mg/dL    Urobilinogen Urine <2.0 <2.0 mg/dL    Nitrite Urine Negative Negative    Leukocyte Esterase Urine 25 Michael/uL (A) Negative    Mucus Urine Present (A) None Seen /LPF    RBC Urine 2 <=2 /HPF    WBC Urine 12 (H) <=5 /HPF    Squamous Epithelials Urine <1 <=1 /HPF   CBC with platelets and differential    Collection Time:  03/01/24  9:29 AM   Result Value Ref Range    WBC Count 6.9 4.0 - 11.0 10e3/uL    RBC Count 2.20 (L) 4.40 - 5.90 10e6/uL    Hemoglobin 7.2 (L) 13.3 - 17.7 g/dL    Hematocrit 22.1 (L) 40.0 - 53.0 %     (H) 78 - 100 fL    MCH 32.7 26.5 - 33.0 pg    MCHC 32.6 31.5 - 36.5 g/dL    RDW 13.2 10.0 - 15.0 %    Platelet Count 298 150 - 450 10e3/uL    % Neutrophils 72 %    % Lymphocytes 16 %    % Monocytes 9 %    % Eosinophils 3 %    % Basophils 0 %    % Immature Granulocytes 0 %    NRBCs per 100 WBC 0 <1 /100    Absolute Neutrophils 5.0 1.6 - 8.3 10e3/uL    Absolute Lymphocytes 1.1 0.0 - 5.3 10e3/uL    Absolute Monocytes 0.6 0.0 - 1.3 10e3/uL    Absolute Eosinophils 0.2 0.0 - 0.7 10e3/uL    Absolute Basophils 0.0 0.0 - 0.2 10e3/uL    Absolute Immature Granulocytes 0.0 <=0.4 10e3/uL    Absolute NRBCs 0.0 10e3/uL        MEDICATIONS:     Review of your medicines            Accurate as of March 1, 2024  6:02 PM. If you have any questions, ask your nurse or doctor.                CONTINUE these medicines which have NOT CHANGED        Dose / Directions   acetaminophen 325 MG tablet  Commonly known as: TYLENOL      Dose: 650 mg  [ACETAMINOPHEN (TYLENOL) 325 MG TABLET] Take 650 mg by mouth every 4 (four) hours as needed for pain.  Refills: 0     amLODIPine 2.5 MG tablet  Commonly known as: NORVASC  Used for: Accelerated hypertension      Dose: 5 mg  Take 2 tablets (5 mg) by mouth daily  Quantity: 60 tablet  Refills: 0     aspirin 81 MG EC tablet  Commonly known as: ASA      Dose: 81 mg  [ASPIRIN 81 MG EC TABLET] Take 81 mg by mouth daily.  Refills: 0     ferrous sulfate 325 (65 Fe) MG tablet  Commonly known as: FEROSUL  Used for: ROXIE (acute kidney injury) (H24)      Dose: 325 mg  Take 1 tablet (325 mg) by mouth every other day  Quantity: 30 tablet  Refills: 0     LANsoprazole 30 MG DR capsule  Commonly known as: PREVACID      Dose: 30 mg  Take 30 mg by mouth at bedtime  Refills: 0     molnupiravir 200 MG capsule  Commonly  known as: LAGEVRIO      Dose: 800 mg  Take 800 mg by mouth every 12 hours  Refills: 0     polyethylene glycol 17 GM/Dose powder  Commonly known as: MIRALAX  Used for: Other constipation      Dose: 17 g  Take 17 g by mouth daily as needed for constipation  Quantity: 510 g  Refills: 0     PreserVision AREDS 2 Caps      Dose: 1 capsule  [VIT C,R-XN-GUKUR-LUTEIN-ZEAXAN (PRESERVISION AREDS-2) 250-90-40-1 MG CAP] Take 1 capsule by mouth 2 (two) times a day.  Refills: 0     simvastatin 20 MG tablet  Commonly known as: ZOCOR  Used for: Hyperlipidemia, unspecified hyperlipidemia type      Dose: 20 mg  Take 1 tablet (20 mg) by mouth at bedtime  Quantity: 90 tablet  Refills: 0              ASSESSMENT/PLAN  Encounter Diagnosis   Name Primary?    Infection due to 2019 novel coronavirus Yes     Dialysis port fell out and patient sent to the ER for evaluation and replacement    COVID-positive started on molnupiravir currently asymptomatic    Dialysis patient Tuesday, Thursday/Saturday     Physical deconditioning PT OT    Falls will continue with therapy for strengthening    Pain management as needed Tylenol, gabapentin 900 mg twice daily,     Hypertension on Norvasc 2.5 mg daily, recently discontinued hydrochlorothiazide and valsartan due to elevated creatinine and potassium    diabetes type 2 he was on metformin however it was discontinued related to elevated creatinine fingersticks twice daily     GERD on Prevacid HDL on simvastatin    Anemia- HGB 7.7     Electronically signed by: Geneva Jaramillo CNP

## 2024-03-01 NOTE — ED PROVIDER NOTES
EMERGENCY DEPARTMENT ENCOUNTER      NAME: Pa García  AGE: 78 year old male  YOB: 1945  MRN: [id number]  EVALUATION DATE & TIME: 3/1/2024  8:53 AM    PCP: Lars Rajan    ED PROVIDER: Nguyen Worthy MD      Chief Complaint   Patient presents with    Vascular Access Problem         FINAL IMPRESSION:  1. Problem with dialysis access, initial encounter (H24)    2. ROXIE (acute kidney injury) (H24)    3. Anemia, unspecified type          ED COURSE & MEDICAL DECISION MAKING:    Pertinent Labs & Imaging studies reviewed. (See chart for details)  78 year old male presents to the Emergency Department for evaluation of accidental dislodgment of his nontunneled temporary dialysis catheter that was placed in his right internal jugular on February 9 in the emergency department.  Patient was placed on dialysis for acute renal failure of unknown etiology, and has been on it Tuesday, Thursday, Saturday.  He continues to make a normal amount of urine. Has normal oral intake.  He reports that it is unclear to him when he is to stop dialysis.  There are no plans for a permanent dialysis catheter placement to his knowledge.  He last received dialysis yesterday.  He is otherwise well-appearing, nontoxic, with no pain or any other symptomatology.  Plan for laboratory studies, consultation with his nephrology team.  Per chart review, he saw associated nephrology consultants while in the emergency department.  If nephrology recommends another dialysis catheter be placed, will contact vascular access for catheter placement.    At the conclusion of the encounter I discussed the results of all of the tests and the disposition. The questions were answered. The patient or family acknowledged understanding and was agreeable with the care plan.     9:07 AM I met with the patient to gather history and to perform my initial exam. We discussed plans for the ED course, including diagnostic testing and treatment.   10:06 AM I  rechecked and updated the patient with results.  10:41 AM I spoke with Dr. Mckeon from  [facility]. She would hold off on a new dialysis catheter placement at this time. Hold off on dialysis given improvement in renal function. Plan for nephrology follow up next week to re-assess. If patient requires a new dialysis catheter at that time, will plan for placement as an outpatient.   10:48 AM I rechecked and updated the patient with results. Discussed plan of discharge, patient agreeable.     Medical Decision Making  Obtained supplemental history:Supplemental history obtained?: Documented in chart  Reviewed external records: External records reviewed?: Inpatient Record: [facility] Admission on 02/01/2024 and [facility] Admission 2/9/24-2/14-24  Care impacted by chronic illness:Chronic Kidney Disease, Diabetes, Heart Disease, Hyperlipidemia, and Hypertension  Care significantly affected by social determinants of health:Access to Medical Care  Did you consider but not order tests?: Work up considered but not performed and documented in chart, if applicable  Did you interpret images independently?: Independent interpretation of ECG and images noted in documentation, when applicable.  Consultation discussion with other provider:Did you involve another provider (consultant, MH, pharmacy, etc.)?: I discussed the care with another health care provider, see documentation for details.  Discharge. No recommendations on prescription strength medication(s). See documentation for any additional details.      MEDICATIONS GIVEN IN THE EMERGENCY:  Medications - No data to display    NEW PRESCRIPTIONS STARTED AT TODAY'S ER VISIT  New Prescriptions    No medications on file          =================================================================    HPI    Patient information was obtained from: Patient    Use of : N/A     Pa García is a 78 year old male with a pertinent history of HTN, HLD, DM, atrial  flutter not anticoagulated, stage III chronic kidney disease, partial paraplegia with neurogenic bowel and bladder as a complication of previous back surgery with right foot drop who presents to this ED via private car for evaluation of vascular access problem.     Per chart review, patient was admitted to [facility] on 02/01/2024 for mechanical fall and generalized weakness. Patient presented to the ED after he lost his balance, causing him to fall backwards. EKG showed atrial flutter and variable AV block. Labs showed mild elevation of troponin that trended down on repeat, creatinine 1.76, and were otherwise unremarkable. Patient was seen by Dr. Gibbs from nephrology. Patient was discharged to [facility] TCU on 02/04/2024 with instructions to start taking polyethylene glycol 17g as needed, and follow-up with PCP in 2 weeks.    Per chart review, patient admitted to [facility] from 2/9/24-2/14/24. Patient had a nontunneled right IJ hemodialysis catheter placed in the ED. S/p HD on 2/9/24, 2/10/24, 2/12/24, 2/14/24. S/p renal biopsy, pathology. Plan is for HD following a TTS schedule for 3 months. Patient will discharge initially to TCU for reconditioning.      Patient reports he awoke this morning and noticed his dialysis catheter next to him in bed. He initially had this placed for acute renal failure and does not know of a plan to make the catheter more permanent or further follow up other than continuing to use temporary catheter. Last dialysis run was yesterday. Denies any pain or significant bleeding from sight. He continues to urinate and is otherwise asymptomatic.     Denies decrease in appetite, shortness of breath, pain, fever, chills, and diaphoresis.     PAST MEDICAL HISTORY:  No past medical history on file.    PAST SURGICAL HISTORY:  Past Surgical History:   Procedure Laterality Date    BACK SURGERY      lumbar laminectomy and fusion/decompression    LUMBAR LAMINECTOMY Bilateral  "3/8/2016    Procedure: REVISION DECOMPRESSION BILATERAL L2-3;  Surgeon: Alexis Amado MD;  Location: [facility] [place];  Service:     LUMBAR LAMINECTOMY  3/13/2016    Procedure: Posterior Laminectomy L1-L3 with Dural Repair, and Microscopic Discectomy;  Surgeon: Foster Rae MD;  Location: [facility] [place];  Service:            CURRENT MEDICATIONS:    acetaminophen (TYLENOL) 325 MG tablet  amLODIPine (NORVASC) 2.5 MG tablet  aspirin 81 MG EC tablet  ferrous sulfate (FEROSUL) 325 (65 Fe) MG tablet  lansoprazole (PREVACID) 30 MG capsule  molnupiravir (LAGEVRIO) 200 MG capsule  polyethylene glycol (MIRALAX) 17 GM/Dose powder  simvastatin (ZOCOR) 20 MG tablet  vit C,D-Dk-rgguf-lutein-zeaxan (PRESERVISION AREDS-2) 250-90-40-1 mg cap        ALLERGIES:  Allergies   Allergen Reactions    Ezetimibe-Simvastatin GI Disturbance    Metformin      Possible ALIYA 2/24       FAMILY HISTORY:  Family History   Problem Relation Age of Onset    Dementia Father     Cancer Maternal Grandmother     Cancer Maternal Grandfather     Cancer Paternal Grandmother     Cancer Paternal Grandfather        SOCIAL HISTORY:   Social History     Socioeconomic History    Marital status: Single   Tobacco Use    Smoking status: Never    Smokeless tobacco: Never   Substance and Sexual Activity    Alcohol use: Yes     Comment: Alcoholic Drinks/day: occ    Drug use: No       VITALS:  BP (!) 168/74   Pulse 67   Temp 98.2  F (36.8  C) (Oral)   Resp 18   Ht 1.753 m (5' 9\")   Wt 76.2 kg (168 lb)   SpO2 99%   BMI 24.81 kg/m      PHYSICAL EXAM    Constitutional: Well developed, Well nourished, NAD  HENT: Normocephalic, Atraumatic, Bilateral external ears normal, Oropharynx normal, mucous membranes moist, Nose normal.   Neck- Normal range of motion, No tenderness, Supple, No stridor.  Eyes: PERRL, EOMI, Conjunctiva normal, No discharge.   Respiratory: Normal breath sounds, No respiratory distress  Cardiovascular: Normal heart rate, Regular " rhythm  GI: Bowel sounds normal, Soft, No tenderness,   Musculoskeletal: No edema. Good range of motion in all major joints. No tenderness to palpation or major deformities noted.   Integument: Warm, Dry, No erythema, No rash. Small hematoma over right internal jugular with external clot in area, no active bleeding.   Neurologic: Alert & oriented x 3, Normal motor function, Normal sensory function, No focal deficits noted. Normal gait.   Psychiatric: Affect normal, Judgment normal, Mood normal.      LAB:  All pertinent labs reviewed and interpreted.  Results for orders placed or performed during the hospital encounter of 03/01/24   Basic metabolic panel   Result Value Ref Range    Sodium 142 135 - 145 mmol/L    Potassium 4.4 3.4 - 5.3 mmol/L    Chloride 104 98 - 107 mmol/L    Carbon Dioxide (CO2) 27 22 - 29 mmol/L    Anion Gap 11 7 - 15 mmol/L    Urea Nitrogen 18.5 8.0 - 23.0 mg/dL    Creatinine 2.33 (H) 0.67 - 1.17 mg/dL    GFR Estimate 28 (L) >60 mL/min/1.73m2    Calcium 8.8 8.8 - 10.2 mg/dL    Glucose 109 (H) 70 - 99 mg/dL   UA with Microscopic reflex to Culture    Specimen: Urine, Midstream   Result Value Ref Range    Color Urine Colorless Colorless, Straw, Light Yellow, Yellow    Appearance Urine Clear Clear    Glucose Urine Negative Negative mg/dL    Bilirubin Urine Negative Negative    Ketones Urine Negative Negative mg/dL    Specific Gravity Urine 1.009 1.001 - 1.030    Blood Urine Negative Negative    pH Urine 8.0 (H) 5.0 - 7.0    Protein Albumin Urine 20 (A) Negative mg/dL    Urobilinogen Urine <2.0 <2.0 mg/dL    Nitrite Urine Negative Negative    Leukocyte Esterase Urine 25 Michael/uL (A) Negative    Mucus Urine Present (A) None Seen /LPF    RBC Urine 2 <=2 /HPF    WBC Urine 12 (H) <=5 /HPF    Squamous Epithelials Urine <1 <=1 /HPF   CBC with platelets and differential   Result Value Ref Range    WBC Count 6.9 4.0 - 11.0 10e3/uL    RBC Count 2.20 (L) 4.40 - 5.90 10e6/uL    Hemoglobin 7.2 (L) 13.3 - 17.7 g/dL     Hematocrit 22.1 (L) 40.0 - 53.0 %     (H) 78 - 100 fL    MCH 32.7 26.5 - 33.0 pg    MCHC 32.6 31.5 - 36.5 g/dL    RDW 13.2 10.0 - 15.0 %    Platelet Count 298 150 - 450 10e3/uL    % Neutrophils 72 %    % Lymphocytes 16 %    % Monocytes 9 %    % Eosinophils 3 %    % Basophils 0 %    % Immature Granulocytes 0 %    NRBCs per 100 WBC 0 <1 /100    Absolute Neutrophils 5.0 1.6 - 8.3 10e3/uL    Absolute Lymphocytes 1.1 0.0 - 5.3 10e3/uL    Absolute Monocytes 0.6 0.0 - 1.3 10e3/uL    Absolute Eosinophils 0.2 0.0 - 0.7 10e3/uL    Absolute Basophils 0.0 0.0 - 0.2 10e3/uL    Absolute Immature Granulocytes 0.0 <=0.4 10e3/uL    Absolute NRBCs 0.0 10e3/uL         I, Evon Cortez, am serving as a scribe to document services personally performed by Nguyen Worthy MD, based on my observation and the provider's statements to me. I, Nguyen Worthy MD, attest that Evon Cortez is acting in a scribe capacity, has observed my performance of the services and has documented them in accordance with my direction.    Nguyen Worthy MD  Emergency Medicine  [facility] EMERGENCY ROOM  [address] [id number] [phone]       Nguyen Worthy MD  03/01/24 1208

## 2024-03-02 LAB — BACTERIA UR CULT: NO GROWTH

## 2024-03-25 NOTE — TELEPHONE ENCOUNTER
RECORDS STATUS - ALL OTHER DIAGNOSIS      RECORDS RECEIVED FROM: [facility] - Internal Records   DATE RECEIVED: 3/25

## 2024-04-04 ENCOUNTER — PRE VISIT (OUTPATIENT)
Dept: ONCOLOGY | Facility: HOSPITAL | Age: 79
End: 2024-04-04
Payer: COMMERCIAL

## 2024-04-08 ENCOUNTER — PATIENT OUTREACH (OUTPATIENT)
Dept: ONCOLOGY | Facility: CLINIC | Age: 79
End: 2024-04-08
Payer: COMMERCIAL

## 2024-04-10 NOTE — PROGRESS NOTES
[facility]: Hematology                                                                Hem/Onc  Referral reviewed  Adult Oncology/Hematology  Referral [[id number]]  Electronically signed by: Nguyen Worthy MD on 03/01/24 1052     Referral Details  Referred By  Referred To   Nguyen Worthy MD  [facility] EMERGENCY DEPT       Diagnoses: Anemia, unspecified type   Order: Adult Oncology/Hematology  Referral    Hematology       Order Questions  Question Answer   My Clinical Question Is: anemia of unknown etioogy   Reason for Referral: Hematology        ASSESSMENT      Clinical History (per Nurse review of records provided):    78 year old male patient referred from  ED as above    Residence: [facility]      Records Location: [facility] Everywhere   Pertinent labs -- BOOKMARKED  Referring provider note(s)-- BOOKMARKED  PCP note - Hospital F/U-- Post Hospital/ Acute Kidney Injury     INTERVENTION(S)                                                      April 10, 2024  OUTGOING CALL to pt, no answer. Left voicemail introducing my role as nurse navigator with [facility] hematology/oncology clinics and that he missed the hematology consult last week with Dr Killian. Requested callback to number below (Mon - Fri 8am - 4:30pm) to speak to a  to reschedule consult date/time/location. Explained to pt that he will also receive a call from our scheduling intake team in the next 1-2 business days    -Referral Triage Scheduling Instructions added to Hem/Onc  referral for Patient Access rep ( number below, hours are Monday - Friday 8am - 4:30 pm) who will contact pt in the next 1-2 business days to schedule the consultation.    PLAN                                                      Hematology consult    See records team's Pre-Visit encounter documentation for additional records retrieval information.    Ro Alarcon, RN, BSN,  OCN  Hematology/Oncology New Patient Nurse Navigator   [facility]  [phone]

## 2024-06-19 ENCOUNTER — TRANSFERRED RECORDS (OUTPATIENT)
Dept: HEALTH INFORMATION MANAGEMENT | Facility: CLINIC | Age: 79
End: 2024-06-19
Payer: COMMERCIAL

## 2024-06-19 LAB
CREATININE (EXTERNAL): 2.55 MG/DL (ref 0.7–1.28)
GFR ESTIMATED (EXTERNAL): 25 ML/MIN/1.73M2
GLUCOSE (EXTERNAL): 164 MG/DL (ref 65–99)
POTASSIUM (EXTERNAL): 5.1 MMOL/L (ref 3.5–5.3)

## 2024-06-21 ENCOUNTER — MEDICAL CORRESPONDENCE (OUTPATIENT)
Dept: HEALTH INFORMATION MANAGEMENT | Facility: CLINIC | Age: 79
End: 2024-06-21
Payer: COMMERCIAL

## 2024-06-21 DIAGNOSIS — D50.9 IRON DEFICIENCY ANEMIA, UNSPECIFIED: Primary | ICD-10-CM

## 2024-06-21 RX ORDER — METHYLPREDNISOLONE SODIUM SUCCINATE 125 MG/2ML
125 INJECTION, POWDER, LYOPHILIZED, FOR SOLUTION INTRAMUSCULAR; INTRAVENOUS
Status: CANCELLED
Start: 2024-06-28

## 2024-06-21 RX ORDER — MEPERIDINE HYDROCHLORIDE 25 MG/ML
25 INJECTION INTRAMUSCULAR; INTRAVENOUS; SUBCUTANEOUS EVERY 30 MIN PRN
Status: CANCELLED | OUTPATIENT
Start: 2024-06-28

## 2024-06-21 RX ORDER — EPINEPHRINE 1 MG/ML
0.3 INJECTION, SOLUTION, CONCENTRATE INTRAVENOUS EVERY 5 MIN PRN
Status: CANCELLED | OUTPATIENT
Start: 2024-06-28

## 2024-06-21 RX ORDER — ALBUTEROL SULFATE 90 UG/1
1-2 AEROSOL, METERED RESPIRATORY (INHALATION)
Status: CANCELLED
Start: 2024-06-28

## 2024-06-21 RX ORDER — HEPARIN SODIUM,PORCINE 10 UNIT/ML
5-20 VIAL (ML) INTRAVENOUS DAILY PRN
Status: CANCELLED | OUTPATIENT
Start: 2024-06-28

## 2024-06-21 RX ORDER — DIPHENHYDRAMINE HYDROCHLORIDE 50 MG/ML
50 INJECTION INTRAMUSCULAR; INTRAVENOUS
Status: CANCELLED
Start: 2024-06-28

## 2024-06-21 RX ORDER — HEPARIN SODIUM (PORCINE) LOCK FLUSH IV SOLN 100 UNIT/ML 100 UNIT/ML
5 SOLUTION INTRAVENOUS
Status: CANCELLED | OUTPATIENT
Start: 2024-06-28

## 2024-06-21 RX ORDER — ALBUTEROL SULFATE 0.83 MG/ML
2.5 SOLUTION RESPIRATORY (INHALATION)
Status: CANCELLED | OUTPATIENT
Start: 2024-06-28

## 2024-06-24 DIAGNOSIS — D50.9 IRON DEFICIENCY ANEMIA, UNSPECIFIED IRON DEFICIENCY ANEMIA TYPE: Primary | ICD-10-CM

## 2024-06-24 RX ORDER — ALBUTEROL SULFATE 90 UG/1
1-2 AEROSOL, METERED RESPIRATORY (INHALATION)
Start: 2024-06-25

## 2024-06-24 RX ORDER — HEPARIN SODIUM,PORCINE 10 UNIT/ML
5-20 VIAL (ML) INTRAVENOUS DAILY PRN
OUTPATIENT
Start: 2024-06-25

## 2024-06-24 RX ORDER — MEPERIDINE HYDROCHLORIDE 25 MG/ML
25 INJECTION INTRAMUSCULAR; INTRAVENOUS; SUBCUTANEOUS EVERY 30 MIN PRN
OUTPATIENT
Start: 2024-06-25

## 2024-06-24 RX ORDER — DIPHENHYDRAMINE HYDROCHLORIDE 50 MG/ML
50 INJECTION INTRAMUSCULAR; INTRAVENOUS
Start: 2024-06-25

## 2024-06-24 RX ORDER — HEPARIN SODIUM (PORCINE) LOCK FLUSH IV SOLN 100 UNIT/ML 100 UNIT/ML
5 SOLUTION INTRAVENOUS
OUTPATIENT
Start: 2024-06-25

## 2024-06-24 RX ORDER — EPINEPHRINE 1 MG/ML
0.3 INJECTION, SOLUTION, CONCENTRATE INTRAVENOUS EVERY 5 MIN PRN
OUTPATIENT
Start: 2024-06-25

## 2024-06-24 RX ORDER — ALBUTEROL SULFATE 0.83 MG/ML
2.5 SOLUTION RESPIRATORY (INHALATION)
OUTPATIENT
Start: 2024-06-25

## 2024-06-27 ENCOUNTER — HOSPITAL ENCOUNTER (EMERGENCY)
Facility: CLINIC | Age: 79
Discharge: HOME | End: 2024-06-28
Attending: EMERGENCY MEDICINE | Admitting: EMERGENCY MEDICINE
Payer: COMMERCIAL

## 2024-06-27 DIAGNOSIS — N18.9 CHRONIC KIDNEY DISEASE, UNSPECIFIED CKD STAGE: ICD-10-CM

## 2024-06-27 DIAGNOSIS — E87.5 HYPERKALEMIA: ICD-10-CM

## 2024-06-27 LAB
ALBUMIN SERPL BCG-MCNC: 4.3 G/DL (ref 3.5–5.2)
ALP SERPL-CCNC: 79 U/L (ref 40–150)
ALT SERPL W P-5'-P-CCNC: 37 U/L (ref 0–70)
ANION GAP SERPL CALCULATED.3IONS-SCNC: 10 MMOL/L (ref 7–15)
AST SERPL W P-5'-P-CCNC: 27 U/L (ref 0–45)
BASOPHILS # BLD AUTO: 0 10E3/UL (ref 0–0.2)
BASOPHILS NFR BLD AUTO: 1 %
BILIRUB SERPL-MCNC: 0.2 MG/DL
BUN SERPL-MCNC: 50.7 MG/DL (ref 8–23)
CALCIUM SERPL-MCNC: 9.5 MG/DL (ref 8.8–10.2)
CHLORIDE SERPL-SCNC: 109 MMOL/L (ref 98–107)
CREAT SERPL-MCNC: 2.88 MG/DL (ref 0.67–1.17)
DEPRECATED HCO3 PLAS-SCNC: 22 MMOL/L (ref 22–29)
EGFRCR SERPLBLD CKD-EPI 2021: 22 ML/MIN/1.73M2
EOSINOPHIL # BLD AUTO: 0.2 10E3/UL (ref 0–0.7)
EOSINOPHIL NFR BLD AUTO: 3 %
ERYTHROCYTE [DISTWIDTH] IN BLOOD BY AUTOMATED COUNT: 14.6 % (ref 10–15)
GLUCOSE SERPL-MCNC: 166 MG/DL (ref 70–99)
HCT VFR BLD AUTO: 34.9 % (ref 40–53)
HGB BLD-MCNC: 10.8 G/DL (ref 13.3–17.7)
IMM GRANULOCYTES # BLD: 0 10E3/UL
IMM GRANULOCYTES NFR BLD: 1 %
LYMPHOCYTES # BLD AUTO: 1.1 10E3/UL (ref 0.8–5.3)
LYMPHOCYTES NFR BLD AUTO: 20 %
MAGNESIUM SERPL-MCNC: 2.4 MG/DL (ref 1.7–2.3)
MCH RBC QN AUTO: 30.2 PG (ref 26.5–33)
MCHC RBC AUTO-ENTMCNC: 30.9 G/DL (ref 31.5–36.5)
MCV RBC AUTO: 98 FL (ref 78–100)
MONOCYTES # BLD AUTO: 0.5 10E3/UL (ref 0–1.3)
MONOCYTES NFR BLD AUTO: 8 %
NEUTROPHILS # BLD AUTO: 3.8 10E3/UL (ref 1.6–8.3)
NEUTROPHILS NFR BLD AUTO: 67 %
NRBC # BLD AUTO: 0 10E3/UL
NRBC BLD AUTO-RTO: 0 /100
PLATELET # BLD AUTO: 327 10E3/UL (ref 150–450)
POTASSIUM SERPL-SCNC: 5.4 MMOL/L (ref 3.4–5.3)
PROT SERPL-MCNC: 7 G/DL (ref 6.4–8.3)
RBC # BLD AUTO: 3.58 10E6/UL (ref 4.4–5.9)
SODIUM SERPL-SCNC: 141 MMOL/L (ref 135–145)
WBC # BLD AUTO: 5.6 10E3/UL (ref 4–11)

## 2024-06-27 PROCEDURE — 96374 THER/PROPH/DIAG INJ IV PUSH: CPT

## 2024-06-27 PROCEDURE — 93005 ELECTROCARDIOGRAM TRACING: CPT | Performed by: EMERGENCY MEDICINE

## 2024-06-27 PROCEDURE — 83735 ASSAY OF MAGNESIUM: CPT | Performed by: EMERGENCY MEDICINE

## 2024-06-27 PROCEDURE — 96361 HYDRATE IV INFUSION ADD-ON: CPT

## 2024-06-27 PROCEDURE — 99284 EMERGENCY DEPT VISIT MOD MDM: CPT | Mod: 25

## 2024-06-27 PROCEDURE — 85025 COMPLETE CBC W/AUTO DIFF WBC: CPT | Performed by: EMERGENCY MEDICINE

## 2024-06-27 PROCEDURE — 80053 COMPREHEN METABOLIC PANEL: CPT | Performed by: EMERGENCY MEDICINE

## 2024-06-27 PROCEDURE — 96375 TX/PRO/DX INJ NEW DRUG ADDON: CPT

## 2024-06-27 PROCEDURE — 258N000003 HC RX IP 258 OP 636: Performed by: EMERGENCY MEDICINE

## 2024-06-27 PROCEDURE — 36415 COLL VENOUS BLD VENIPUNCTURE: CPT | Performed by: EMERGENCY MEDICINE

## 2024-06-27 RX ORDER — DEXTROSE MONOHYDRATE 25 G/50ML
25-50 INJECTION, SOLUTION INTRAVENOUS
Status: DISCONTINUED | OUTPATIENT
Start: 2024-06-27 | End: 2024-06-28 | Stop reason: HOSPADM

## 2024-06-27 RX ORDER — NICOTINE POLACRILEX 4 MG
15-30 LOZENGE BUCCAL
Status: DISCONTINUED | OUTPATIENT
Start: 2024-06-27 | End: 2024-06-28 | Stop reason: HOSPADM

## 2024-06-27 RX ORDER — DEXTROSE MONOHYDRATE 25 G/50ML
25 INJECTION, SOLUTION INTRAVENOUS ONCE
Status: COMPLETED | OUTPATIENT
Start: 2024-06-27 | End: 2024-06-28

## 2024-06-27 RX ORDER — ALBUTEROL SULFATE 5 MG/ML
10 SOLUTION RESPIRATORY (INHALATION) ONCE
Status: COMPLETED | OUTPATIENT
Start: 2024-06-27 | End: 2024-06-28

## 2024-06-27 RX ADMIN — SODIUM CHLORIDE 1000 ML: 9 INJECTION, SOLUTION INTRAVENOUS at 23:32

## 2024-06-27 ASSESSMENT — ACTIVITIES OF DAILY LIVING (ADL): ADLS_ACUITY_SCORE: 36

## 2024-06-27 ASSESSMENT — COLUMBIA-SUICIDE SEVERITY RATING SCALE - C-SSRS
1. IN THE PAST MONTH, HAVE YOU WISHED YOU WERE DEAD OR WISHED YOU COULD GO TO SLEEP AND NOT WAKE UP?: NO
2. HAVE YOU ACTUALLY HAD ANY THOUGHTS OF KILLING YOURSELF IN THE PAST MONTH?: NO
6. HAVE YOU EVER DONE ANYTHING, STARTED TO DO ANYTHING, OR PREPARED TO DO ANYTHING TO END YOUR LIFE?: NO

## 2024-06-28 VITALS
WEIGHT: 150 LBS | RESPIRATION RATE: 18 BRPM | HEART RATE: 62 BPM | SYSTOLIC BLOOD PRESSURE: 176 MMHG | DIASTOLIC BLOOD PRESSURE: 82 MMHG | OXYGEN SATURATION: 99 % | HEIGHT: 69 IN | BODY MASS INDEX: 22.22 KG/M2 | TEMPERATURE: 97.8 F

## 2024-06-28 LAB
ANION GAP SERPL CALCULATED.3IONS-SCNC: 13 MMOL/L (ref 7–15)
ATRIAL RATE - MUSE: 57 BPM
BUN SERPL-MCNC: 48.6 MG/DL (ref 8–23)
CALCIUM SERPL-MCNC: 8.8 MG/DL (ref 8.8–10.2)
CHLORIDE SERPL-SCNC: 111 MMOL/L (ref 98–107)
CREAT SERPL-MCNC: 2.69 MG/DL (ref 0.67–1.17)
DEPRECATED HCO3 PLAS-SCNC: 17 MMOL/L (ref 22–29)
DIASTOLIC BLOOD PRESSURE - MUSE: NORMAL MMHG
EGFRCR SERPLBLD CKD-EPI 2021: 23 ML/MIN/1.73M2
GLUCOSE BLDC GLUCOMTR-MCNC: 176 MG/DL (ref 70–99)
GLUCOSE BLDC GLUCOMTR-MCNC: 198 MG/DL (ref 70–99)
GLUCOSE BLDC GLUCOMTR-MCNC: 209 MG/DL (ref 70–99)
GLUCOSE BLDC GLUCOMTR-MCNC: 261 MG/DL (ref 70–99)
GLUCOSE SERPL-MCNC: 214 MG/DL (ref 70–99)
INTERPRETATION ECG - MUSE: NORMAL
P AXIS - MUSE: 81 DEGREES
POTASSIUM SERPL-SCNC: 4.4 MMOL/L (ref 3.4–5.3)
PR INTERVAL - MUSE: 258 MS
QRS DURATION - MUSE: 96 MS
QT - MUSE: 396 MS
QTC - MUSE: 385 MS
R AXIS - MUSE: 20 DEGREES
SODIUM SERPL-SCNC: 141 MMOL/L (ref 135–145)
SYSTOLIC BLOOD PRESSURE - MUSE: NORMAL MMHG
T AXIS - MUSE: 46 DEGREES
VENTRICULAR RATE- MUSE: 57 BPM

## 2024-06-28 PROCEDURE — 82962 GLUCOSE BLOOD TEST: CPT

## 2024-06-28 PROCEDURE — 258N000003 HC RX IP 258 OP 636: Performed by: EMERGENCY MEDICINE

## 2024-06-28 PROCEDURE — 250N000012 HC RX MED GY IP 250 OP 636 PS 637: Performed by: EMERGENCY MEDICINE

## 2024-06-28 PROCEDURE — 94640 AIRWAY INHALATION TREATMENT: CPT

## 2024-06-28 PROCEDURE — 36415 COLL VENOUS BLD VENIPUNCTURE: CPT | Performed by: EMERGENCY MEDICINE

## 2024-06-28 PROCEDURE — 258N000001 HC RX 258: Performed by: EMERGENCY MEDICINE

## 2024-06-28 PROCEDURE — 80048 BASIC METABOLIC PNL TOTAL CA: CPT | Performed by: EMERGENCY MEDICINE

## 2024-06-28 PROCEDURE — 999N000157 HC STATISTIC RCP TIME EA 10 MIN

## 2024-06-28 PROCEDURE — 250N000009 HC RX 250: Performed by: EMERGENCY MEDICINE

## 2024-06-28 RX ADMIN — SODIUM CHLORIDE 6.8 UNITS: 9 INJECTION, SOLUTION INTRAVENOUS at 00:27

## 2024-06-28 RX ADMIN — ALBUTEROL SULFATE 10 MG: 2.5 SOLUTION RESPIRATORY (INHALATION) at 00:14

## 2024-06-28 RX ADMIN — DEXTROSE MONOHYDRATE 25 G: 25 INJECTION, SOLUTION INTRAVENOUS at 00:23

## 2024-06-28 RX ADMIN — DEXTROSE MONOHYDRATE 300 ML: 100 INJECTION, SOLUTION INTRAVENOUS at 00:39

## 2024-06-28 ASSESSMENT — ACTIVITIES OF DAILY LIVING (ADL)
ADLS_ACUITY_SCORE: 38

## 2024-06-28 NOTE — DISCHARGE INSTRUCTIONS
Discussed in the ER your potassium was shifted and then cleared with some IV fluids.  Given the elevated potassium as well as BUN would recommend continuing good fluid resuscitation with oral hydration at home mainly water and avoiding heavy amounts of electrolyte drinks which could have potassium or sodium in them as this could build up with your chronic kidney disease.  Recommend follow-up with your primary doctor or nephrologist for recheck of labs.

## 2024-06-28 NOTE — ED TRIAGE NOTES
Pt arrives to ed w c/o of abnormal labs, pt states got a call from on call dr and said phosphorus was high.denies any symptoms.     Triage Assessment (Adult)       Row Name 06/27/24 2058          Triage Assessment    Airway WDL WDL        Respiratory WDL    Respiratory WDL WDL        Cardiac WDL    Cardiac WDL WDL        Cognitive/Neuro/Behavioral WDL    Cognitive/Neuro/Behavioral WDL WDL

## 2024-06-28 NOTE — ED PROVIDER NOTES
NAME: Pa García  AGE: 79 year old male  YOB: 1945  MRN: [id number]  EVALUATION DATE & TIME: No admission date for patient encounter.    PCP: Lars Rajan    ED PROVIDER: Nima Little M.D.    Chief Complaint   Patient presents with    Abnormal Labs     FINAL IMPRESSION:  1. Hyperkalemia    2. Chronic kidney disease, unspecified CKD stage        MEDICAL DECISION MAKIN:13 PM  I met with the patient, obtained history, performed an initial exam, and discussed options and plan for diagnostics and treatment here in the ED.     Patient was clinically assessed and consented to treatment. After assessment, medical decision making and workup were discussed with the patient. The patient was agreeable to plan for testing, workup, and treatment.  Pertinent Labs & Imaging studies reviewed. (See chart for details)       Medical Decision Making  Obtained supplemental history:Supplemental history obtained?: Documented in chart  Reviewed external records: External records reviewed?: Documented in chart  Care impacted by chronic illness:Chronic Kidney Disease, Diabetes, Hyperlipidemia, and Hypertension  Care significantly affected by social determinants of health:N/A  Did you consider but not order tests?: Work up considered but not performed and documented in chart, if applicable  Did you interpret images independently?: Independent interpretation of ECG and images noted in documentation, when applicable.  Consultation discussion with other provider:Did you involve another provider (consultant, MH, pharmacy, etc.)?: No  Discharge. No recommendations on prescription strength medication(s). See documentation for any additional details.    Pa García is a 79 year old male who presents with abnormal lab.   Differential diagnosis includes but not limited to hyperkalemia, acute on chronic kidney injury, lab error with hemolysis, hypermagnesemia.  Patient is a 79-year-old male who recently had issues  with acute kidney injury from his metformin.  Patient was taken off this and kidney function has improved with a creatinine of 2.3 on his last visit.  He had labs with his primary doctor today which showed a potassium of 6.3.  Patient was called to come in.  He does not have any complaints presently and reports that he is feeling better  with regard to his kidneys and chronical medical conditions including diabetes.  He does not report any irregular heartbeats or muscle spasms, cramping.  Patient had labs repeated in triage which showed a potassium of 5.4 and likely some of the elevation from earlier is hemolysis.  His creatinine was slightly up from 2.3-2.8 however BUN is also more significantly elevated which could just indicate dehydration.  Patient reports that he has been restricting some fluids somewhat out of concern with the kidneys baseline past problems.  Upon discussion with patient given the lack of symptoms and no obvious signs of significant hyperkalemia on EKG such as QRS widening although T waves were possibly slightly more prominent.  Patient will receive hyperkalemia treatment here in the ER  along with a liter of fluids.  Will plan to recheck the BMP after receiving this treatment for shift of the potassium.  Given that his potassium is only slightly above normal if creatinine and BUN improving and potassium comes down possibly patient could go home and follow-up with his nephrologist end of this week or next week.  Patient's potassium came down to 4.4 and BUN and creatinine did come down slightly on recheck of metabolic panel.  Patient continue without symptoms here and given the improvement in the potassium as well as discussion of patient's hydration for which he is mainly drinking propel electrolyte drinks I did caution him as these do have electrolytes and potassium salts in them.  This would be less than ideal and I recommended good water hydration.  We discussed possible admission to  continue monitoring his kidney function but given the lack of symptoms and that he is urinating normally and can hydrate at home I would recommend continuing water and fluids with good hydration at home and monitoring urine output as well as close follow-up with his primary doctor or nephrologist by phone today to schedule recheck of labs as an outpatient.  Patient in agreement with this and comfortable with discharge home.    0 minutes of critical care time    MEDICATIONS GIVEN IN THE EMERGENCY:  Medications   glucose gel 15-30 g (has no administration in time range)     Or   dextrose 50 % injection 25-50 mL (has no administration in time range)     Or   glucagon injection 1 mg (has no administration in time range)   albuterol (PROVENTIL) neb solution 10 mg (10 mg Nebulization $Given 6/28/24 0014)   sodium chloride 0.9% BOLUS 1,000 mL (0 mLs Intravenous Stopped 6/28/24 0145)   dextrose 10% BOLUS 300 mL (0 mLs Intravenous Stopped 6/28/24 0243)   dextrose 50 % injection 25 g (25 g Intravenous $Given 6/28/24 0023)   insulin regular 1 unit/mL injection 6.8 Units (6.8 Units Intravenous $Given 6/28/24 0027)       NEW PRESCRIPTIONS STARTED AT TODAY'S ER VISIT:  New Prescriptions    No medications on file          =================================================================    HPI    Patient information was obtained from: patient     Use of : N/A       Pa García is a 79 year old male with a past medical history of hyperlipidemia, uncontrolled diabetes mellitus, hypertension, chronic kidney disease, who presents for abnormal labs.The patient was told that his potassium level was at 6.3, and this raised some concern. He said his kidneys are fine and that he has been off dialysis for almost two months.  Chest pain, palpitation, shortness of breath, no flank pain, no difficulty with urination.  Patient does report he has been limiting some of his fluid intakes due to the history of kidney problems.    Per  review 6/27/2024 office visit, patient with potassium level of 6.3.     REVIEW OF SYSTEMS   Review of Systems   All other systems reviewed and are negative.     PAST MEDICAL HISTORY:  History reviewed. No pertinent past medical history.    PAST SURGICAL HISTORY:  Past Surgical History:   Procedure Laterality Date    BACK SURGERY      lumbar laminectomy and fusion/decompression    LUMBAR LAMINECTOMY Bilateral 3/8/2016    Procedure: REVISION DECOMPRESSION BILATERAL L2-3;  Surgeon: Alexis Amado MD;  Location: [facility] [place];  Service:     LUMBAR LAMINECTOMY  3/13/2016    Procedure: Posterior Laminectomy L1-L3 with Dural Repair, and Microscopic Discectomy;  Surgeon: Foster Rae MD;  Location: [facility] [place];  Service:        CURRENT MEDICATIONS:      Current Facility-Administered Medications:     glucose gel 15-30 g, 15-30 g, Oral, Q15 Min PRN **OR** dextrose 50 % injection 25-50 mL, 25-50 mL, Intravenous, Q15 Min PRN **OR** glucagon injection 1 mg, 1 mg, Subcutaneous, Q15 Min PRN, Nima Little MD    Current Outpatient Medications:     acetaminophen (TYLENOL) 325 MG tablet, [ACETAMINOPHEN (TYLENOL) 325 MG TABLET] Take 650 mg by mouth every 4 (four) hours as needed for pain., Disp: , Rfl:     amLODIPine (NORVASC) 2.5 MG tablet, Take 2 tablets (5 mg) by mouth daily, Disp: 60 tablet, Rfl: 0    aspirin 81 MG EC tablet, [ASPIRIN 81 MG EC TABLET] Take 81 mg by mouth daily., Disp: , Rfl:     ferrous sulfate (FEROSUL) 325 (65 Fe) MG tablet, Take 1 tablet (325 mg) by mouth every other day, Disp: 30 tablet, Rfl: 0    lansoprazole (PREVACID) 30 MG capsule, Take 30 mg by mouth at bedtime, Disp: , Rfl:     polyethylene glycol (MIRALAX) 17 GM/Dose powder, Take 17 g by mouth daily as needed for constipation, Disp: 510 g, Rfl: 0    simvastatin (ZOCOR) 20 MG tablet, Take 1 tablet (20 mg) by mouth at bedtime, Disp: 90 tablet, Rfl: 0    vit C,G-Ze-uvqjm-lutein-zeaxan (PRESERVISION AREDS-2) 250-90-40-1 mg  cap, [VIT C,B-RM-DXSKV-LUTEIN-ZEAXAN (PRESERVISION AREDS-2) 250-90-40-1 MG CAP] Take 1 capsule by mouth 2 (two) times a day., Disp: , Rfl:     ALLERGIES:  Allergies   Allergen Reactions    Ezetimibe-Simvastatin GI Disturbance    Metformin      Possible ALIYA 2/24       FAMILY HISTORY:  Family History   Problem Relation Age of Onset    Dementia Father     Cancer Maternal Grandmother     Cancer Maternal Grandfather     Cancer Paternal Grandmother     Cancer Paternal Grandfather        SOCIAL HISTORY:   Social History     Socioeconomic History    Marital status: Single   Tobacco Use    Smoking status: Never    Smokeless tobacco: Never   Substance and Sexual Activity    Alcohol use: Yes     Comment: Alcoholic Drinks/day: occ    Drug use: No     Social Determinants of Health      [facility]    Financial Resource Strain   Food Insecurity: No Food Insecurity (6/1/2024)    [facility]    Hunger Vital Sign     Worried About Running Out of Food in the Last Year: Never true     Ran Out of Food in the Last Year: Never true   Transportation Needs: No Transportation Needs (6/1/2024)    [facility]    PRAPARE - Transportation     Lack of Transportation (Medical): No     Lack of Transportation (Non-Medical): No    [facility]    Social Connections   Interpersonal Safety: Not At Risk (6/1/2024)    Received from [facility]    EH IP Custom IPV     Do you feel UNSAFE in any of your personal relationships with your family members or any other acquaintances?: No   Housing Stability: Low Risk  (6/1/2024)    [facility]    Housing Stability Vital Sign     Unable to Pay for  "Housing in the Last Year: No     Number of Times Moved in the Last Year: 0     Homeless in the Last Year: No       PHYSICAL EXAM:    Vitals: BP (!) 195/80   Pulse 60   Temp 97.8  F (36.6  C) (Oral)   Resp 18   Ht 1.753 m (5' 9\")   Wt 68 kg (150 lb)   SpO2 97%   BMI 22.15 kg/m     Physical Exam  Vitals and nursing note reviewed.   Constitutional:       General: He is not in acute distress.     Appearance: Normal appearance. He is normal weight.   HENT:      Head: Normocephalic.      Mouth/Throat:      Mouth: Mucous membranes are dry.   Cardiovascular:      Rate and Rhythm: Normal rate and regular rhythm.      Heart sounds: Normal heart sounds.   Pulmonary:      Effort: Pulmonary effort is normal. No respiratory distress.      Breath sounds: Normal breath sounds.   Musculoskeletal:      Cervical back: Normal range of motion.      Right lower leg: No edema.      Left lower leg: No edema.   Skin:     General: Skin is warm and dry.   Neurological:      General: No focal deficit present.      Mental Status: He is alert.   Psychiatric:         Behavior: Behavior normal.           LAB:  All pertinent labs reviewed and interpreted.  Labs Ordered and Resulted from Time of ED Arrival to Time of ED Departure   COMPREHENSIVE METABOLIC PANEL - Abnormal       Result Value    Sodium 141      Potassium 5.4 (*)     Carbon Dioxide (CO2) 22      Anion Gap 10      Urea Nitrogen 50.7 (*)     Creatinine 2.88 (*)     GFR Estimate 22 (*)     Calcium 9.5      Chloride 109 (*)     Glucose 166 (*)     Alkaline Phosphatase 79      AST 27      ALT 37      Protein Total 7.0      Albumin 4.3      Bilirubin Total 0.2     MAGNESIUM - Abnormal    Magnesium 2.4 (*)    CBC WITH PLATELETS AND DIFFERENTIAL - Abnormal    WBC Count 5.6      RBC Count 3.58 (*)     Hemoglobin 10.8 (*)     Hematocrit 34.9 (*)     MCV 98      MCH 30.2      MCHC 30.9 (*)     RDW 14.6      Platelet Count 327      % Neutrophils 67      % Lymphocytes 20      % Monocytes 8   "    % Eosinophils 3      % Basophils 1      % Immature Granulocytes 1      NRBCs per 100 WBC 0      Absolute Neutrophils 3.8      Absolute Lymphocytes 1.1      Absolute Monocytes 0.5      Absolute Eosinophils 0.2      Absolute Basophils 0.0      Absolute Immature Granulocytes 0.0      Absolute NRBCs 0.0     BASIC METABOLIC PANEL - Abnormal    Sodium 141      Potassium 4.4      Chloride 111 (*)     Carbon Dioxide (CO2) 17 (*)     Anion Gap 13      Urea Nitrogen 48.6 (*)     Creatinine 2.69 (*)     GFR Estimate 23 (*)     Calcium 8.8      Glucose 214 (*)    GLUCOSE BY METER - Abnormal    GLUCOSE BY METER POCT 261 (*)    GLUCOSE BY METER - Abnormal    GLUCOSE BY METER POCT 209 (*)    GLUCOSE BY METER - Abnormal    GLUCOSE BY METER POCT 198 (*)    GLUCOSE BY METER - Abnormal    GLUCOSE BY METER POCT 176 (*)    GLUCOSE MONITOR NURSING POCT   GLUCOSE MONITOR NURSING POCT   GLUCOSE MONITOR NURSING POCT       RADIOLOGY:  No orders to display     EKG:   Performed at: 21:43, 6/27/2024  Impression: Sinus bradycardia with first-degree AV block, incomplete right bundle branch block, compared to prior EKG with atrial fibrillation but no signs of acute ST elevation ischemia, prominent T waves intermittently in a few leads but no diffuse peaked T waves.  Rate: 57 bpm  Rhythm: Sinus  QRS Interval: 96 ms  QTc Interval: 385 ms  Comparison: Sinus rhythm replaced with atrial fibrillation, nonspecific T wave abnormality no longer evident in inferior leads. Compared with EKG 2/18/2024  I have independently reviewed and interpreted the EKG(s) documented above.     PROCEDURES:   Procedures       I, Tawny Cervantes, am serving as a scribe to document services personally performed by Dr. Nima Little  based on my observation and the provider's statements to me. I, Nima Little MD attest that Tawny Cervantes is acting in a scribe capacity, has observed my performance of the services and has documented them in accordance with my  direction.      Nima Little M.D.  Emergency Medicine  [facility] Emergency Department       Nima Little MD  06/28/24 0243

## 2024-06-28 NOTE — PROGRESS NOTES
"BP (!) 195/80   Pulse 60   Temp 97.8  F (36.6  C) (Oral)   Resp 18   Ht 1.753 m (5' 9\")   Wt 68 kg (150 lb)   SpO2 97%   BMI 22.15 kg/m        The PT was provided a neb per MD order. The neb was provided to treat hyper-K+: therefore, I did not perform a respiratory assessment.  "

## 2025-04-01 NOTE — PROGRESS NOTES
Date: 2/10/2024  Time: 1330     Data:  Pre Wt:   69.4 kg  Desired Wt:   To be established  Post Wt:  69.4 kg (estimated)  Weight change: - 0 kg  Ultrafiltration - Post Run Net Total Removed (mL):  0 ml  Vascular Access Status: CVC patent  Dialyzer Rinse:  Light  Total Blood Volume Processed: 35.9 L   Total Dialysis (Treatment) Time:   2.5 Hrs  Dialysate Bath: K 2, Ca 3  Heparin: Heparin: None     Lab:   Yes  HbsAg - unknown (2/10/24)  HbsAb -  unknown (2/10/24)    Interventions:  Dialysis done through right CVC  UF set to 0 Liters of fluid removal, accommodating priming and rinse back volumes  ,   See administered per MAR  CVC dressing changed aseptically  Treatment has ended safely and  blood is rinsed back completely  Catheter lumens flushed with saline and locked with heparin, catheter caps (ClearGuard ) changed post HD  Report given to PCN in stable condition     Assessment:  A/O x 4, calm & cooperative, denies pain  CVC intact, previous dressing clean and dry                Plan:    Per Renal team    Steven Carlos, OMARN, RN  Acute Dialysis RN  [facility]       show

## (undated) RX ORDER — FENTANYL CITRATE 50 UG/ML
INJECTION, SOLUTION INTRAMUSCULAR; INTRAVENOUS
Status: DISPENSED
Start: 2024-02-13

## (undated) RX ORDER — HYDRALAZINE HYDROCHLORIDE 20 MG/ML
INJECTION INTRAMUSCULAR; INTRAVENOUS
Status: DISPENSED
Start: 2024-02-13